# Patient Record
Sex: MALE | Race: WHITE | NOT HISPANIC OR LATINO | Employment: OTHER | URBAN - NONMETROPOLITAN AREA
[De-identification: names, ages, dates, MRNs, and addresses within clinical notes are randomized per-mention and may not be internally consistent; named-entity substitution may affect disease eponyms.]

---

## 2017-02-03 DIAGNOSIS — K21.9 GASTROESOPHAGEAL REFLUX DISEASE, ESOPHAGITIS PRESENCE NOT SPECIFIED: Primary | ICD-10-CM

## 2017-02-06 RX ORDER — ESOMEPRAZOLE MAGNESIUM 40 MG/1
40 CAPSULE, DELAYED RELEASE ORAL DAILY
Qty: 30 CAPSULE | Refills: 5 | Status: SHIPPED | OUTPATIENT
Start: 2017-02-06 | End: 2017-08-11 | Stop reason: SDUPTHER

## 2017-03-29 ENCOUNTER — OFFICE VISIT (OUTPATIENT)
Dept: GASTROENTEROLOGY | Facility: CLINIC | Age: 77
End: 2017-03-29

## 2017-03-29 VITALS
HEART RATE: 68 BPM | WEIGHT: 204 LBS | HEIGHT: 76 IN | DIASTOLIC BLOOD PRESSURE: 80 MMHG | OXYGEN SATURATION: 98 % | BODY MASS INDEX: 24.84 KG/M2 | SYSTOLIC BLOOD PRESSURE: 138 MMHG

## 2017-03-29 DIAGNOSIS — E11.9 CONTROLLED TYPE 2 DIABETES MELLITUS WITHOUT COMPLICATION, WITHOUT LONG-TERM CURRENT USE OF INSULIN (HCC): ICD-10-CM

## 2017-03-29 DIAGNOSIS — Z86.010 HX OF COLONIC POLYPS: Primary | ICD-10-CM

## 2017-03-29 PROBLEM — Z86.0100 HX OF COLONIC POLYPS: Status: ACTIVE | Noted: 2017-03-29

## 2017-03-29 PROCEDURE — S0260 H&P FOR SURGERY: HCPCS | Performed by: CLINICAL NURSE SPECIALIST

## 2017-03-29 RX ORDER — TAMSULOSIN HYDROCHLORIDE 0.4 MG/1
1 CAPSULE ORAL NIGHTLY
COMMUNITY
End: 2020-04-15

## 2017-03-29 RX ORDER — TRAMADOL HYDROCHLORIDE 50 MG/1
50 TABLET ORAL AS NEEDED
COMMUNITY
End: 2020-04-22 | Stop reason: SDUPTHER

## 2017-03-29 RX ORDER — ASPIRIN 81 MG/1
81 TABLET ORAL DAILY
COMMUNITY
End: 2022-08-03 | Stop reason: HOSPADM

## 2017-03-29 RX ORDER — ROSUVASTATIN CALCIUM 5 MG/1
5 TABLET, COATED ORAL EVERY OTHER DAY
COMMUNITY
End: 2020-11-24

## 2017-03-29 RX ORDER — ALPRAZOLAM 0.5 MG/1
0.5 TABLET ORAL NIGHTLY PRN
COMMUNITY
End: 2020-04-22 | Stop reason: SDUPTHER

## 2017-03-29 RX ORDER — METFORMIN HYDROCHLORIDE 750 MG/1
750 TABLET, EXTENDED RELEASE ORAL 2 TIMES DAILY
COMMUNITY
End: 2020-07-06

## 2017-03-29 NOTE — PROGRESS NOTES
Caleb Wood  1940      3/29/2017  Chief Complaint   Patient presents with   • Colonoscopy     Subjective   HPI  Caleb Wood is a 76 y.o. male who presents as a referral for preventative maintenance. He has no complaints of nausea or vomiting. No change in bowels. No wt loss. No BRBPR. No melena. There is a negative family hx for colon cancer. No abdominal pain.  Past Medical History:   Diagnosis Date   • Diabetes mellitus     Type 2   • Disease of thyroid gland    • GERD (gastroesophageal reflux disease)    • Hemorrhoids    • Hx of colonic polyps    • Hypercholesteremia      Past Surgical History:   Procedure Laterality Date   • CHOLECYSTECTOMY     • COLONOSCOPY  01/17/2012    Colon polyp tissue not retrieved repeat exam in 5 years   • COLONOSCOPY  01/06/2009    adenomatous polyp x1   • ENDOSCOPY  06/25/2013    [facility]   • KNEE SURGERY       Outpatient Prescriptions Marked as Taking for the 3/29/17 encounter (Office Visit) with MERARI Marie   Medication Sig Dispense Refill   • ALPRAZolam (XANAX) 0.5 MG tablet Take 0.5 mg by mouth 2 (Two) Times a Day As Needed for Anxiety.     • aspirin (ECOTRIN LOW STRENGTH) 81 MG EC tablet Take 81 mg by mouth Daily.     • metFORMIN ER (GLUCOPHAGE-XR) 750 MG 24 hr tablet Take 750 mg by mouth Daily With Breakfast.     • rosuvastatin (CRESTOR) 5 MG tablet Take 5 mg by mouth Daily.     • tamsulosin (FLOMAX) 0.4 MG capsule 24 hr capsule Take 1 capsule by mouth Every Night.     • traMADol (ULTRAM) 50 MG tablet Take 50 mg by mouth Every 6 (Six) Hours As Needed for Moderate Pain (4-6).       No Known Allergies  Social History     Social History   • Marital status:      Spouse name: N/A   • Number of children: N/A   • Years of education: N/A     Occupational History   • Not on file.     Social History Main Topics   • Smoking status: Never Smoker   • Smokeless tobacco: Not on file   • Alcohol use No   • Drug use: Not on file   • Sexual activity: Not on file     Other  "Topics Concern   • Not on file     Social History Narrative     Family History   Problem Relation Age of Onset   • Colon cancer Neg Hx    • Colon polyps Neg Hx      Health Maintenance   Topic Date Due   • TDAP/TD VACCINES (1 - Tdap) 08/10/1960   • PNEUMOCOCCAL VACCINES (65+ LOW/MEDIUM RISK) (1 of 2 - PCV13) 08/10/2006   • INFLUENZA VACCINE  08/01/2016   • LIPID PANEL  03/29/2017   • HEMOGLOBIN A1C  03/29/2017   • URINE MICROALBUMIN  03/29/2017   • COLONOSCOPY  03/29/2017   • ZOSTER VACCINE  03/29/2017   • DIABETIC FOOT EXAM  03/29/2017   • DIABETIC EYE EXAM  03/29/2017       REVIEW OF SYSTEMS  General: well appearing, no fever chills or sweats, no unexplained wt loss  HEENT: no acute visual or hearing disturbances  Cardiovascular: No chest pain or palpitations  Pulmonary: No shortness of breath, coughing, wheezing or hemoptysis  : No burning, urgency, hematuria, or dysuria  Musculoskeletal: No joint pain or stiffness  Peripheral: no edema  Skin: No lesions or rashes  Neuro: No dizziness, headaches, stroke, syncope  Endocrine: No hot or cold intolerances  Hematological: No blood dyscrasias    Objective   Vitals:    03/29/17 1308   BP: 138/80   Pulse: 68   SpO2: 98%   Weight: 204 lb (92.5 kg)   Height: 76\" (193 cm)     Body mass index is 24.83 kg/(m^2).    PHYSICAL EXAM  General: age appropriate well nourished well appearing, no acute distress  Head: normocephalic and atraumatic  Global assessment-supple  Neck-No JVD noted, no lymphadenopathy  Pulmonary-clear to auscultation bilaterally, normal respiratory effort  Cardiovascular-normal rate and rhythm, normal heart sounds, S1 and S2 noted  Abdomen-soft, non tender, non distended, normal bowel sounds all 4 quadrants, no hepatosplenomegaly noted  Extremities-No clubbing cyanosis or edema  Neuro-Non focal, converses appropriately, awake, alert, oriented    Assessment/Plan     Caleb was seen today for colonoscopy.    Diagnoses and all orders for this visit:    Hx of " colonic polyps  -     polyethylene glycol (GoLYTELY) 236 G solution; Starting at noon on day prior to procedure, drink 8 ounces every 30 minutes until all gone or stools are clear. May add flavor packet.  -     Case Request; Standing  -     Implement Anesthesia Orders Day of Procedure; Standing  -     Obtain Informed Consent; Standing  -     Verify Informed Consent; Standing  -     Verify bowel prep was successful; Standing  -     Case Request    Controlled type 2 diabetes mellitus without complication, without long-term current use of insulin  Comments:  Hold DM pill the am of procedure        COLONOSCOPY WITH ANESTHESIA (N/A)  Body mass index is 24.83 kg/(m^2).  There are no Patient Instructions on file for this visit.  Patient instructions on prep prior to procedure provided to the patient.    All risks, benefits, alternatives, and indications of colonoscopy procedure have been discussed with the patient. Risks to include perforation of the colon requiring possible surgery or colostomy, risk of bleeding from biopsies or removal of colon tissue, possibility of missing a colon polyp or cancer, or adverse drug reaction.  Benefits to include the diagnosis and management of disease of the colon and rectum. Alternatives to include barium enema, radiographic evaluation, lab testing or no intervention. Pt verbalizes understanding and agrees.

## 2017-06-01 ENCOUNTER — ANESTHESIA EVENT (OUTPATIENT)
Dept: GASTROENTEROLOGY | Facility: HOSPITAL | Age: 77
End: 2017-06-01

## 2017-06-01 ENCOUNTER — HOSPITAL ENCOUNTER (OUTPATIENT)
Facility: HOSPITAL | Age: 77
Setting detail: HOSPITAL OUTPATIENT SURGERY
Discharge: HOME | End: 2017-06-01
Attending: INTERNAL MEDICINE | Admitting: INTERNAL MEDICINE

## 2017-06-01 ENCOUNTER — ANESTHESIA (OUTPATIENT)
Dept: GASTROENTEROLOGY | Facility: HOSPITAL | Age: 77
End: 2017-06-01

## 2017-06-01 VITALS
HEIGHT: 76 IN | OXYGEN SATURATION: 100 % | RESPIRATION RATE: 11 BRPM | WEIGHT: 197 LBS | TEMPERATURE: 98.1 F | BODY MASS INDEX: 23.99 KG/M2 | SYSTOLIC BLOOD PRESSURE: 141 MMHG | DIASTOLIC BLOOD PRESSURE: 74 MMHG | HEART RATE: 53 BPM

## 2017-06-01 DIAGNOSIS — Z86.010 HX OF COLONIC POLYPS: ICD-10-CM

## 2017-06-01 LAB — GLUCOSE BLDC GLUCOMTR-MCNC: 108 MG/DL (ref 70–130)

## 2017-06-01 PROCEDURE — 25010000002 PROPOFOL 10 MG/ML EMULSION: Performed by: NURSE ANESTHETIST, CERTIFIED REGISTERED

## 2017-06-01 PROCEDURE — 88305 TISSUE EXAM BY PATHOLOGIST: CPT | Performed by: INTERNAL MEDICINE

## 2017-06-01 PROCEDURE — 82962 GLUCOSE BLOOD TEST: CPT

## 2017-06-01 PROCEDURE — 45385 COLONOSCOPY W/LESION REMOVAL: CPT | Performed by: INTERNAL MEDICINE

## 2017-06-01 RX ORDER — PROPOFOL 10 MG/ML
VIAL (ML) INTRAVENOUS AS NEEDED
Status: DISCONTINUED | OUTPATIENT
Start: 2017-06-01 | End: 2017-06-01 | Stop reason: SURG

## 2017-06-01 RX ORDER — LIDOCAINE HYDROCHLORIDE 10 MG/ML
0.5 INJECTION, SOLUTION INFILTRATION; PERINEURAL ONCE AS NEEDED
Status: DISCONTINUED | OUTPATIENT
Start: 2017-06-01 | End: 2017-06-01 | Stop reason: HOSPADM

## 2017-06-01 RX ORDER — LIDOCAINE HYDROCHLORIDE 20 MG/ML
INJECTION, SOLUTION INFILTRATION; PERINEURAL AS NEEDED
Status: DISCONTINUED | OUTPATIENT
Start: 2017-06-01 | End: 2017-06-01 | Stop reason: SURG

## 2017-06-01 RX ORDER — SODIUM CHLORIDE 0.9 % (FLUSH) 0.9 %
3 SYRINGE (ML) INJECTION AS NEEDED
Status: DISCONTINUED | OUTPATIENT
Start: 2017-06-01 | End: 2017-06-01 | Stop reason: HOSPADM

## 2017-06-01 RX ORDER — SODIUM CHLORIDE 9 MG/ML
500 INJECTION, SOLUTION INTRAVENOUS CONTINUOUS PRN
Status: DISCONTINUED | OUTPATIENT
Start: 2017-06-01 | End: 2017-06-01 | Stop reason: HOSPADM

## 2017-06-01 RX ADMIN — SODIUM CHLORIDE 500 ML: 9 INJECTION, SOLUTION INTRAVENOUS at 07:14

## 2017-06-01 RX ADMIN — LIDOCAINE HYDROCHLORIDE 50 MG: 20 INJECTION, SOLUTION INFILTRATION; PERINEURAL at 08:17

## 2017-06-01 RX ADMIN — PROPOFOL 200 MG: 10 INJECTION, EMULSION INTRAVENOUS at 08:17

## 2017-06-01 NOTE — PLAN OF CARE
Problem: Patient Care Overview (Adult)  Goal: Adult Individualization and Mutuality    06/01/17 0651   Individualization   Patient Specific Preferences none   Patient Specific Goals none   Patient Specific Interventions none   Mutuality/Individual Preferences   What Anxieties, Fears or Concerns Do You Have About Your Health or Care? none   What Questions Do You Have About Your Health or Care? none   What Information Would Help Us Give You More Personalized Care? none

## 2017-06-01 NOTE — PLAN OF CARE
Problem: GI Endoscopy (Adult)  Goal: Signs and Symptoms of Listed Potential Problems Will be Absent or Manageable (GI Endoscopy)  Outcome: Outcome(s) achieved Date Met:  06/01/17

## 2017-06-01 NOTE — H&P
[facility]  Pre Procedure History & Physical    Chief Complaint:   History of polyps    Subjective     HPI:   Here today for colonoscopy.  History of polyps.  Last exam in 2012.    Past Medical History:   Past Medical History:   Diagnosis Date   • Diabetes mellitus     Type 2   • Disease of thyroid gland    • GERD (gastroesophageal reflux disease)    • Hemorrhoids    • Hx of colonic polyps    • Hypercholesteremia        Past Surgical History:  [unfilled]    Family History:  Family History   Problem Relation Age of Onset   • Colon cancer Neg Hx    • Colon polyps Neg Hx        Social History:   reports that he has never smoked. He does not have any smokeless tobacco history on file. He reports that he does not drink alcohol or use illicit drugs.    Medications:   Prior to Admission medications    Medication Sig Start Date End Date Taking? Authorizing Provider   ALPRAZolam (XANAX) 0.5 MG tablet Take 0.5 mg by mouth 2 (Two) Times a Day As Needed for Anxiety.   Yes Historical Provider, MD   aspirin (ECOTRIN LOW STRENGTH) 81 MG EC tablet Take 81 mg by mouth Daily.   Yes Historical Provider, MD   esomeprazole (nexIUM) 40 MG capsule Take 1 capsule by mouth Daily. 2/6/17  Yes MERARI Marie   metFORMIN ER (GLUCOPHAGE-XR) 750 MG 24 hr tablet Take 750 mg by mouth Daily With Breakfast.   Yes Historical Provider, MD   polyethylene glycol (GoLYTELY) 236 G solution Starting at noon on day prior to procedure, drink 8 ounces every 30 minutes until all gone or stools are clear. May add flavor packet. 3/29/17  Yes MERARI Marie   rosuvastatin (CRESTOR) 5 MG tablet Take 5 mg by mouth Daily.    Historical Provider, MD   tamsulosin (FLOMAX) 0.4 MG capsule 24 hr capsule Take 1 capsule by mouth Every Night.    Historical Provider, MD   traMADol (ULTRAM) 50 MG tablet Take 50 mg by mouth Every 6 (Six) Hours As Needed for Moderate Pain (4-6).    Historical Provider, MD       Allergies:  Review of  "patient's allergies indicates no known allergies.    Objective     Blood pressure 140/73, pulse 66, temperature 98.1 °F (36.7 °C), temperature source Temporal Artery , resp. rate 20, height 76\" (193 cm), weight 197 lb (89.4 kg), SpO2 99 %.    Physical Exam   Constitutional: Pt is oriented to person, place, and in no distress.   HENT: Mouth/Throat: Oropharynx is clear.   Cardiovascular: Normal rate, regular rhythm.    Pulmonary/Chest: Effort normal. No respiratory distress. No  wheezes.   Abdominal: Soft. Non-distended.  Skin: Skin is warm and dry.   Psychiatric: Mood, memory, affect and judgment appear normal.     Assessment/Plan     Diagnosis:  History of polyps    Anticipated Surgical Procedure:    Proceed with colonoscopy as scheduled    The risks, benefits, and alternatives of this procedure have been discussed with the patient or the responsible party- the patient understands and agrees to proceed.    EMR Dragon/transcription disclaimer: Much of this encounter note is an electronic transcription/translation of spoken language to printed text.  The electronic translation of spoken language may permit erroneous, or at times, nonsensical words or phrases to be inadvertently transcribed.  Although I have reviewed the note for such errors, some may still exist.    Damien Odonnell MD  8:16 AM  6/1/2017    "

## 2017-06-01 NOTE — ANESTHESIA PREPROCEDURE EVALUATION
Anesthesia Evaluation     NPO Solid Status: > 8 hours       Airway   Mallampati: II  TM distance: >3 FB  Neck ROM: full  no difficulty expected  Dental    (+) upper dentures and lower dentures    Pulmonary    Cardiovascular     (+) hyperlipidemia      Neuro/Psych  GI/Hepatic/Renal/Endo    (+)  GERD, diabetes mellitus type 2,     Musculoskeletal     Abdominal    Substance History      OB/GYN          Other                                      Anesthesia Plan    ASA 2     general   total IV anesthesia  intravenous induction   Anesthetic plan and risks discussed with patient.

## 2017-06-01 NOTE — ANESTHESIA POSTPROCEDURE EVALUATION
Patient: Caleb Wood    Procedure Summary     Date Anesthesia Start Anesthesia Stop Room / Location    06/01/17 0815 0834  [facility] / [facility]       Procedure Diagnosis Surgeon Provider    COLONOSCOPY WITH ANESTHESIA (N/A ) Hx of colonic polyps  (Hx of colonic polyps [Z86.010]) MD Sonido Yepez CRNA          Anesthesia Type: No value filed.  Last vitals  BP      Temp      Pulse     Resp      SpO2        Post Anesthesia Care and Evaluation    Patient location during evaluation: PHASE II  Patient participation: complete - patient participated  Level of consciousness: awake and alert  Pain score: 0  Pain management: adequate  Airway patency: patent  Anesthetic complications: No anesthetic complications    Cardiovascular status: acceptable, hemodynamically stable and stable  Respiratory status: acceptable  Hydration status: acceptable

## 2017-06-01 NOTE — PLAN OF CARE
Problem: Patient Care Overview (Adult)  Goal: Plan of Care Review  Outcome: Outcome(s) achieved Date Met:  06/01/17 06/01/17 0839   Coping/Psychosocial Response Interventions   Plan Of Care Reviewed With patient   Patient Care Overview   Progress improving   Outcome Evaluation   Outcome Summary/Follow up Plan D/C CRITERIA MET

## 2017-06-01 NOTE — PLAN OF CARE
Problem: Patient Care Overview (Adult)  Goal: Plan of Care Review  Outcome: Ongoing (interventions implemented as appropriate)    06/01/17 0834   Coping/Psychosocial Response Interventions   Plan Of Care Reviewed With patient   Patient Care Overview   Progress no change   Outcome Evaluation   Outcome Summary/Follow up Plan pt tolerated procedure well.

## 2017-06-02 LAB
CYTO UR: NORMAL
LAB AP CASE REPORT: NORMAL
LAB AP CLINICAL INFORMATION: NORMAL
Lab: NORMAL
PATH REPORT.FINAL DX SPEC: NORMAL
PATH REPORT.GROSS SPEC: NORMAL

## 2017-06-04 ENCOUNTER — TELEPHONE (OUTPATIENT)
Dept: GASTROENTEROLOGY | Facility: CLINIC | Age: 77
End: 2017-06-04

## 2017-06-05 NOTE — TELEPHONE ENCOUNTER
Pt called answering service Saturday 6/3/17 @ 2005.  Pt c/o sudden onset of intermittent lower abdominal pain that developed earlier in the afternoon.  No aggravating or alleviating factors.  No rectal bleeding.  Colonoscopy Thurs.  No BM since cscope.  Advised pt to seek treatment at [facility] ER if he was experiencing abdominal pain.

## 2017-06-05 NOTE — TELEPHONE ENCOUNTER
Called patient to check on him states the pain is gone had a normal bowel movement yesterday no blood doing fine he states told him to call with any further problems.

## 2017-08-11 DIAGNOSIS — K21.9 GASTROESOPHAGEAL REFLUX DISEASE, ESOPHAGITIS PRESENCE NOT SPECIFIED: ICD-10-CM

## 2017-08-11 RX ORDER — ESOMEPRAZOLE MAGNESIUM 40 MG/1
40 CAPSULE, DELAYED RELEASE ORAL DAILY
Qty: 30 CAPSULE | Refills: 5 | Status: SHIPPED | OUTPATIENT
Start: 2017-08-11 | End: 2018-02-09 | Stop reason: SDUPTHER

## 2017-08-11 NOTE — TELEPHONE ENCOUNTER
Pt states that he ran out of his generic Nexium and would like a refill.  He received his last fill on 7/11/2017.

## 2018-02-09 DIAGNOSIS — K21.9 GASTROESOPHAGEAL REFLUX DISEASE, ESOPHAGITIS PRESENCE NOT SPECIFIED: ICD-10-CM

## 2018-02-09 RX ORDER — ESOMEPRAZOLE MAGNESIUM 40 MG/1
40 CAPSULE, DELAYED RELEASE ORAL DAILY
Qty: 30 CAPSULE | Refills: 11 | Status: SHIPPED | OUTPATIENT
Start: 2018-02-09 | End: 2018-05-11 | Stop reason: SDUPTHER

## 2018-05-11 DIAGNOSIS — K21.9 GASTROESOPHAGEAL REFLUX DISEASE, ESOPHAGITIS PRESENCE NOT SPECIFIED: ICD-10-CM

## 2018-05-14 RX ORDER — ESOMEPRAZOLE MAGNESIUM 40 MG/1
CAPSULE, DELAYED RELEASE ORAL
Qty: 30 CAPSULE | Refills: 11 | Status: SHIPPED | OUTPATIENT
Start: 2018-05-14 | End: 2019-03-11 | Stop reason: SDUPTHER

## 2019-03-11 DIAGNOSIS — K21.9 GASTROESOPHAGEAL REFLUX DISEASE, ESOPHAGITIS PRESENCE NOT SPECIFIED: ICD-10-CM

## 2019-03-11 RX ORDER — ESOMEPRAZOLE MAGNESIUM 40 MG/1
40 CAPSULE, DELAYED RELEASE ORAL DAILY
Qty: 30 CAPSULE | Refills: 11 | Status: SHIPPED | OUTPATIENT
Start: 2019-03-11 | End: 2020-03-16 | Stop reason: SDUPTHER

## 2020-03-09 ENCOUNTER — TELEPHONE (OUTPATIENT)
Dept: INTERNAL MEDICINE | Facility: CLINIC | Age: 80
End: 2020-03-09

## 2020-03-09 RX ORDER — LEVOTHYROXINE SODIUM 0.05 MG/1
50 TABLET ORAL DAILY
Qty: 90 TABLET | Refills: 3 | Status: SHIPPED | OUTPATIENT
Start: 2020-03-09 | End: 2021-03-08

## 2020-03-09 RX ORDER — LEVOTHYROXINE SODIUM 0.05 MG/1
50 TABLET ORAL DAILY
Qty: 90 TABLET | Refills: 3 | Status: SHIPPED | OUTPATIENT
Start: 2020-03-09 | End: 2020-03-09 | Stop reason: SDUPTHER

## 2020-03-09 NOTE — TELEPHONE ENCOUNTER
Pt requested a refill on:  Levothyroxine 50MCG    Pharmacy Preferred:  [address] - [phone]  - [phone]   [phone]    Pt can be reached at :[phone]    Pt is completely out.   done

## 2020-03-09 NOTE — TELEPHONE ENCOUNTER
Pt requested a refill on:  Levothyroxine 50MCG     Pharmacy Preferred:  [facility] [address] [place] [address] - [phone]  - [phone]   [phone]      Pt said he is completely out of medicine.     Please call pt once Rx called in

## 2020-03-16 DIAGNOSIS — K21.9 GASTROESOPHAGEAL REFLUX DISEASE, ESOPHAGITIS PRESENCE NOT SPECIFIED: ICD-10-CM

## 2020-03-16 RX ORDER — ESOMEPRAZOLE MAGNESIUM 40 MG/1
40 CAPSULE, DELAYED RELEASE ORAL DAILY
Qty: 30 CAPSULE | Refills: 11 | Status: SHIPPED | OUTPATIENT
Start: 2020-03-16 | End: 2021-03-22

## 2020-03-19 ENCOUNTER — OFFICE VISIT (OUTPATIENT)
Dept: INTERNAL MEDICINE | Facility: CLINIC | Age: 80
End: 2020-03-19

## 2020-03-19 VITALS
OXYGEN SATURATION: 99 % | BODY MASS INDEX: 24.72 KG/M2 | SYSTOLIC BLOOD PRESSURE: 132 MMHG | DIASTOLIC BLOOD PRESSURE: 80 MMHG | HEIGHT: 76 IN | HEART RATE: 76 BPM | WEIGHT: 203 LBS

## 2020-03-19 DIAGNOSIS — I10 BENIGN ESSENTIAL HTN: ICD-10-CM

## 2020-03-19 DIAGNOSIS — E03.9 ACQUIRED HYPOTHYROIDISM: ICD-10-CM

## 2020-03-19 DIAGNOSIS — F32.4 MAJOR DEPRESSIVE DISORDER WITH SINGLE EPISODE, IN PARTIAL REMISSION (HCC): ICD-10-CM

## 2020-03-19 DIAGNOSIS — E11.9 CONTROLLED TYPE 2 DIABETES MELLITUS WITHOUT COMPLICATION, WITHOUT LONG-TERM CURRENT USE OF INSULIN (HCC): Primary | ICD-10-CM

## 2020-03-19 PROBLEM — R31.9 HEMATURIA SYNDROME: Status: ACTIVE | Noted: 2020-03-19

## 2020-03-19 PROBLEM — Z86.010 HISTORY OF COLONIC POLYPS: Status: ACTIVE | Noted: 2020-03-19

## 2020-03-19 PROBLEM — Z86.0100 HISTORY OF COLONIC POLYPS: Status: ACTIVE | Noted: 2020-03-19

## 2020-03-19 PROBLEM — M54.2 NECK PAIN: Status: ACTIVE | Noted: 2020-03-19

## 2020-03-19 PROBLEM — N20.0 CALCULUS OF KIDNEY: Status: ACTIVE | Noted: 2020-03-19

## 2020-03-19 PROBLEM — E78.5 HYPERLIPIDEMIA: Status: ACTIVE | Noted: 2020-03-19

## 2020-03-19 PROBLEM — F43.23 SITUATIONAL MIXED ANXIETY AND DEPRESSIVE DISORDER: Status: ACTIVE | Noted: 2020-03-19

## 2020-03-19 PROBLEM — D70.9 NEUTROPENIA (HCC): Status: ACTIVE | Noted: 2020-03-19

## 2020-03-19 LAB — HBA1C MFR BLD: 6.7 %

## 2020-03-19 PROCEDURE — 99214 OFFICE O/P EST MOD 30 MIN: CPT | Performed by: INTERNAL MEDICINE

## 2020-03-19 PROCEDURE — 83036 HEMOGLOBIN GLYCOSYLATED A1C: CPT | Performed by: INTERNAL MEDICINE

## 2020-03-19 NOTE — PROGRESS NOTES
Dr. Valencia addendum to glasses Rx completed yesterday  Reggie Linares RN 6:35 AM 05/09/19         Note to Dr. Valencia to assist if able to print or update glasses Rx with diagnosis code for transitional lenses for medical necessity per request  Reggie Linares RN 2:14 PM 05/08/19           [facility]    Phone Message    May a detailed message be left on voicemail: yes    Reason for Call: Other: Pt requests a call back from staff to discuss her need of a letter of medical necessity for her to get the glasses she wants made with a transiitions lense.      Action Taken: Message routed to:  [facility] eye clinic     Subjective   Caleb Wood is a 79 y.o. male.   Chief Complaint   Patient presents with   • Diabetes     A1C 6.7   • Hypertension       Patient is here for diabetes follow-up as well as his depression.  He states that he has not been eating as well recently due to the depression.  He does have a  friend that he meets with on a daily basis.  He had some questions about being with him I gave him the usual six-foot distancing recommendations.  In regard to his depression he has been seen by mental health services however he says that that actually makes his condition worse.  He describes it as being like a wound that is continuing to get irritated.  He is not suicidal or having those types of issues.       The following portions of the patient's history were reviewed and updated as appropriate: allergies, current medications, past family history, past medical history, past social history, past surgical history and problem list.    Review of Systems   Constitutional: Negative for activity change, appetite change, fatigue, fever, unexpected weight gain and unexpected weight loss.   HENT: Negative for swollen glands, trouble swallowing and voice change.    Eyes: Negative for blurred vision and visual disturbance.   Respiratory: Negative for cough and shortness of breath.    Cardiovascular: Negative for chest pain, palpitations and leg swelling.   Gastrointestinal: Negative for abdominal pain, constipation, diarrhea, nausea, vomiting and indigestion.   Endocrine: Negative for cold intolerance, heat intolerance, polydipsia and polyphagia.   Genitourinary: Negative for dysuria and frequency.   Musculoskeletal: Negative for arthralgias, back pain, joint swelling and neck pain.   Skin: Negative for color change, rash and skin lesions.   Neurological: Negative for dizziness, weakness, headache, memory problem and confusion.   Hematological: Does not bruise/bleed easily.   Psychiatric/Behavioral: Negative for agitation,  hallucinations and suicidal ideas. The patient is not nervous/anxious.        Objective   Past Medical History:   Diagnosis Date   • Diabetes mellitus (CMS/HCC)     Type 2   • Disease of thyroid gland    • GERD (gastroesophageal reflux disease)    • Hemorrhoids    • Hx of colonic polyps    • Hypercholesteremia       Past Surgical History:   Procedure Laterality Date   • CHOLECYSTECTOMY     • COLONOSCOPY  01/17/2012    Colon polyp tissue not retrieved repeat exam in 5 years   • COLONOSCOPY  01/06/2009    adenomatous polyp x1   • COLONOSCOPY N/A 6/1/2017    Procedure: COLONOSCOPY WITH ANESTHESIA;  Surgeon: Damien Odonnell MD;  Location: [facility] ENDOSCOPY;  Service:    • ENDOSCOPY  06/25/2013    [facility]   • KNEE SURGERY          Current Outpatient Medications:   •  ALPRAZolam (XANAX) 0.5 MG tablet, Take 0.5 mg by mouth At Night As Needed for Anxiety., Disp: , Rfl:   •  aspirin (ECOTRIN LOW STRENGTH) 81 MG EC tablet, Take 81 mg by mouth Daily., Disp: , Rfl:   •  esomeprazole (nexIUM) 40 MG capsule, Take 1 capsule by mouth Daily., Disp: 30 capsule, Rfl: 11  •  levothyroxine (SYNTHROID) 50 MCG tablet, Take 1 tablet by mouth Daily., Disp: 90 tablet, Rfl: 3  •  metFORMIN ER (GLUCOPHAGE-XR) 750 MG 24 hr tablet, Take 750 mg by mouth 2 (Two) Times a Day., Disp: , Rfl:   •  rosuvastatin (CRESTOR) 5 MG tablet, Take 5 mg by mouth Every Other Day., Disp: , Rfl:   •  tamsulosin (FLOMAX) 0.4 MG capsule 24 hr capsule, Take 1 capsule by mouth Every Night., Disp: , Rfl:   •  traMADol (ULTRAM) 50 MG tablet, Take 50 mg by mouth As Needed for Moderate Pain ., Disp: , Rfl:      Vitals:    03/19/20 0748   BP: 132/80   Pulse: 76   SpO2: 99%         03/19/20  0748   Weight: 92.1 kg (203 lb)     Patient's Body mass index is 24.71 kg/m². BMI is within normal parameters. No follow-up required..      Physical Exam   Constitutional: He is oriented to person, place, and time. He appears well-developed and well-nourished.   HENT:   Head: Normocephalic and  atraumatic.   Right Ear: External ear normal.   Left Ear: External ear normal.   Nose: Nose normal.   Mouth/Throat: Oropharynx is clear and moist.   Eyes: Pupils are equal, round, and reactive to light. Conjunctivae and EOM are normal.   Neck: Normal range of motion. Neck supple. No thyromegaly present.   Cardiovascular: Normal rate, regular rhythm, normal heart sounds and intact distal pulses.   Pulmonary/Chest: Effort normal and breath sounds normal.   Abdominal: Soft. Bowel sounds are normal.   Lymphadenopathy:     He has no cervical adenopathy.   Neurological: He is alert and oriented to person, place, and time.   Skin: Skin is warm and dry.   Psychiatric: He has a normal mood and affect. His behavior is normal. Thought content normal.   Nursing note and vitals reviewed.            Assessment/Plan   Diagnoses and all orders for this visit:    1. Controlled type 2 diabetes mellitus without complication, without long-term current use of insulin (CMS/[facility]) (Primary)  -     POC Glycosylated Hemoglobin (Hb A1C)    2. Benign essential HTN    3. Acquired hypothyroidism    4. Major depressive disorder with single episode, in partial remission (CMS/[facility])        Patient's diabetes is well controlled at this point we discussed diet exercise when the weather improves.  His blood pressure is well controlled there is no change in his medication hypothyroidism is currently treated he is taking his medication as prescribed.  We talked about his depression and ways of social distancing while he is talking with local  which seems to be helping him.  No new interventions are prescribed today

## 2020-04-15 RX ORDER — TAMSULOSIN HYDROCHLORIDE 0.4 MG/1
CAPSULE ORAL
Qty: 90 CAPSULE | Refills: 3 | Status: SHIPPED | OUTPATIENT
Start: 2020-04-15 | End: 2021-04-12

## 2020-04-22 DIAGNOSIS — G89.29 CHRONIC LOW BACK PAIN, UNSPECIFIED BACK PAIN LATERALITY, UNSPECIFIED WHETHER SCIATICA PRESENT: Primary | ICD-10-CM

## 2020-04-22 DIAGNOSIS — M54.50 CHRONIC LOW BACK PAIN, UNSPECIFIED BACK PAIN LATERALITY, UNSPECIFIED WHETHER SCIATICA PRESENT: Primary | ICD-10-CM

## 2020-04-22 DIAGNOSIS — F43.23 SITUATIONAL MIXED ANXIETY AND DEPRESSIVE DISORDER: ICD-10-CM

## 2020-04-22 NOTE — TELEPHONE ENCOUNTER
Looks like the last time we filled the Ultram was in 2016. Will patient need telephone visit with Dr Garcias?

## 2020-04-22 NOTE — TELEPHONE ENCOUNTER
PT called to request refills on the following medications:   · ALPRAZolam (XANAX) 0.5 MG tablet  · traMADol (ULTRAM) 50 MG tablet     Confirmed Pharmacy:   [address] [phone] [facility] [phone] [facility]     LOV: 3/19/20  NOV: 10/5/20  Last Refill: Unknown, PT states he hasn't filled the Tramdol since 2016 because he takes it PRN, but now he's out.

## 2020-04-23 RX ORDER — ALPRAZOLAM 0.5 MG/1
0.5 TABLET ORAL NIGHTLY PRN
Qty: 30 TABLET | Refills: 5 | Status: SHIPPED | OUTPATIENT
Start: 2020-04-23 | End: 2020-10-26 | Stop reason: SDUPTHER

## 2020-04-23 RX ORDER — TRAMADOL HYDROCHLORIDE 50 MG/1
50 TABLET ORAL DAILY PRN
Qty: 30 TABLET | Refills: 0 | Status: SHIPPED | OUTPATIENT
Start: 2020-04-23 | End: 2021-11-04 | Stop reason: SDUPTHER

## 2020-04-23 NOTE — TELEPHONE ENCOUNTER
Patient has enough xanax for Saturday, but due to the doctor not being in the office on Friday was trying to have refills done. Also he states that tramadol is used for back pain. Had a prescription but went out in 2016, does not care if it is for  30 would just like to have when he is out and hurts his back.

## 2020-04-23 NOTE — TELEPHONE ENCOUNTER
Looks like he has chronic back pain, but we need to call him and ask what he needs pain medication for, then forward to Dr. Garcias for final approval.  You can go ahead and pend the Rx, after you talk with him.

## 2020-07-06 RX ORDER — METFORMIN HYDROCHLORIDE 750 MG/1
TABLET, EXTENDED RELEASE ORAL
Qty: 180 TABLET | Refills: 3 | Status: SHIPPED | OUTPATIENT
Start: 2020-07-06 | End: 2021-07-21

## 2020-07-22 ENCOUNTER — TELEPHONE (OUTPATIENT)
Dept: INTERNAL MEDICINE | Facility: CLINIC | Age: 80
End: 2020-07-22

## 2020-07-27 ENCOUNTER — OFFICE VISIT (OUTPATIENT)
Dept: INTERNAL MEDICINE | Facility: CLINIC | Age: 80
End: 2020-07-27

## 2020-07-27 VITALS
BODY MASS INDEX: 24.72 KG/M2 | WEIGHT: 203 LBS | HEART RATE: 61 BPM | TEMPERATURE: 98.9 F | OXYGEN SATURATION: 99 % | SYSTOLIC BLOOD PRESSURE: 132 MMHG | DIASTOLIC BLOOD PRESSURE: 78 MMHG | HEIGHT: 76 IN

## 2020-07-27 DIAGNOSIS — E78.00 PURE HYPERCHOLESTEROLEMIA: ICD-10-CM

## 2020-07-27 DIAGNOSIS — I10 BENIGN ESSENTIAL HTN: Primary | ICD-10-CM

## 2020-07-27 DIAGNOSIS — G56.91 NEUROPATHY, ARM, RIGHT: ICD-10-CM

## 2020-07-27 PROBLEM — G89.29 CHRONIC BILATERAL LOW BACK PAIN WITHOUT SCIATICA: Status: ACTIVE | Noted: 2020-07-27

## 2020-07-27 PROBLEM — M54.50 CHRONIC BILATERAL LOW BACK PAIN WITHOUT SCIATICA: Status: ACTIVE | Noted: 2020-07-27

## 2020-07-27 PROCEDURE — 99214 OFFICE O/P EST MOD 30 MIN: CPT | Performed by: INTERNAL MEDICINE

## 2020-07-27 RX ORDER — UBIDECARENONE 100 MG
200 CAPSULE ORAL EVERY EVENING
COMMUNITY

## 2020-07-27 RX ORDER — IBUPROFEN 200 MG
200 TABLET ORAL EVERY 8 HOURS PRN
COMMUNITY

## 2020-07-27 NOTE — PROGRESS NOTES
Subjective   Caleb Wood is a 79 y.o. male.   Chief Complaint   Patient presents with   • Numbness     TINGLING & NUMBNESS OF R ARM; ONGOING 5 DAYS; PREV. HAD SHOULDER SURGERY FOR PINCHED NERVE, COULD NOT GET INTO [facility], GOT A MASSAGE AND FELT BETTER AT TIME       Patient has the above-mentioned tingling in his arm however he has no loss of strength he has good range of motion etc.  He is not complaining of neck pain.       The following portions of the patient's history were reviewed and updated as appropriate: allergies, current medications, past family history, past medical history, past social history, past surgical history and problem list.    Review of Systems   Constitutional: Negative for activity change, appetite change, fatigue, fever, unexpected weight gain and unexpected weight loss.   HENT: Negative for swollen glands, trouble swallowing and voice change.    Eyes: Negative for blurred vision and visual disturbance.   Respiratory: Negative for cough and shortness of breath.    Cardiovascular: Negative for chest pain, palpitations and leg swelling.   Gastrointestinal: Negative for abdominal pain, constipation, diarrhea, nausea, vomiting and indigestion.   Endocrine: Negative for cold intolerance, heat intolerance, polydipsia and polyphagia.   Genitourinary: Negative for dysuria and frequency.   Musculoskeletal: Negative for arthralgias, back pain, joint swelling and neck pain.   Skin: Negative for color change, rash and skin lesions.   Neurological: Negative for dizziness, weakness, headache, memory problem and confusion.   Hematological: Does not bruise/bleed easily.   Psychiatric/Behavioral: Negative for agitation, hallucinations and suicidal ideas. The patient is not nervous/anxious.        Objective   Past Medical History:   Diagnosis Date   • Diabetes mellitus (CMS/HCC)     Type 2   • Disease of thyroid gland    • GERD (gastroesophageal reflux disease)    • Hemorrhoids    • Hx of colonic polyps    •  Hypercholesteremia       Past Surgical History:   Procedure Laterality Date   • CHOLECYSTECTOMY     • COLONOSCOPY  01/17/2012    Colon polyp tissue not retrieved repeat exam in 5 years   • COLONOSCOPY  01/06/2009    adenomatous polyp x1   • COLONOSCOPY N/A 6/1/2017    Procedure: COLONOSCOPY WITH ANESTHESIA;  Surgeon: Damien Odonnell MD;  Location: [facility] ENDOSCOPY;  Service:    • ENDOSCOPY  06/25/2013    HH   • KNEE SURGERY     • SHOULDER SURGERY Right         Current Outpatient Medications:   •  ALPRAZolam (XANAX) 0.5 MG tablet, Take 1 tablet by mouth At Night As Needed for Anxiety., Disp: 30 tablet, Rfl: 5  •  aspirin (ECOTRIN LOW STRENGTH) 81 MG EC tablet, Take 81 mg by mouth Daily., Disp: , Rfl:   •  coenzyme Q10 100 MG capsule, Take 200 mg by mouth Every Evening., Disp: , Rfl:   •  esomeprazole (nexIUM) 40 MG capsule, Take 1 capsule by mouth Daily., Disp: 30 capsule, Rfl: 11  •  ibuprofen (ADVIL,MOTRIN) 200 MG tablet, Take 200 mg by mouth Every 8 (Eight) Hours As Needed for Mild Pain ., Disp: , Rfl:   •  levothyroxine (SYNTHROID) 50 MCG tablet, Take 1 tablet by mouth Daily., Disp: 90 tablet, Rfl: 3  •  metFORMIN ER (GLUCOPHAGE-XR) 750 MG 24 hr tablet, Take 1 tablet by mouth twice daily, Disp: 180 tablet, Rfl: 3  •  rosuvastatin (CRESTOR) 5 MG tablet, Take 5 mg by mouth Every Other Day., Disp: , Rfl:   •  tamsulosin (FLOMAX) 0.4 MG capsule 24 hr capsule, TAKE 1 CAPSULE BY MOUTH ONCE A DAY AT BEDTIME, Disp: 90 capsule, Rfl: 3  •  traMADol (ULTRAM) 50 MG tablet, Take 1 tablet by mouth Daily As Needed for Moderate Pain ., Disp: 30 tablet, Rfl: 0     Vitals:    07/27/20 1432   BP: 132/78   Pulse: 61   Temp: 98.9 °F (37.2 °C)   SpO2: 99%         07/27/20  1432   Weight: 92.1 kg (203 lb)     Patient's Body mass index is 24.71 kg/m². BMI is within normal parameters. No follow-up required..      Physical Exam   Constitutional: He is oriented to person, place, and time. He appears well-developed and well-nourished.    HENT:   Head: Normocephalic and atraumatic.   Right Ear: External ear normal.   Left Ear: External ear normal.   Nose: Nose normal.   Mouth/Throat: Oropharynx is clear and moist.   Eyes: Pupils are equal, round, and reactive to light. Conjunctivae and EOM are normal.   Neck: Normal range of motion. Neck supple. No thyromegaly present.   Cardiovascular: Normal rate, regular rhythm, normal heart sounds and intact distal pulses.   Pulmonary/Chest: Effort normal and breath sounds normal.   Abdominal: Soft. Bowel sounds are normal.   Lymphadenopathy:     He has no cervical adenopathy.   Neurological: He is alert and oriented to person, place, and time.   Skin: Skin is warm and dry.   Psychiatric: He has a normal mood and affect. His behavior is normal. Thought content normal.   Nursing note and vitals reviewed.            Assessment/Plan   Diagnoses and all orders for this visit:    1. Benign essential HTN (Primary)    2. Pure hypercholesterolemia    3. Neuropathy, arm, right      At this point patient does have some sensations occurring in his right arm I cannot discern whether this is from his neck or impingement of the nerve ending at the level of the shoulder or the elbow.  I have encouraged him to do some stretches he apparently is already been counseled regarding that he is also going to seek chiropractic care if that does not help we will be forced to do an MRI of his neck and right shoulder.

## 2020-10-19 ENCOUNTER — TELEPHONE (OUTPATIENT)
Dept: INTERNAL MEDICINE | Facility: CLINIC | Age: 80
End: 2020-10-19

## 2020-10-19 RX ORDER — CYCLOBENZAPRINE HCL 10 MG
10 TABLET ORAL 3 TIMES DAILY PRN
Qty: 30 TABLET | Refills: 1 | Status: SHIPPED | OUTPATIENT
Start: 2020-10-19 | End: 2021-11-04 | Stop reason: SDUPTHER

## 2020-10-19 NOTE — TELEPHONE ENCOUNTER
Patient called and wanted to have Rx of Cyclobenzaprine 10 MG filled as the Rx he has is .     Please contact patient and advise. [phone].      Patient wanted to have Rx of Cyclobenzaprine to be sent along with one refill

## 2020-10-19 NOTE — TELEPHONE ENCOUNTER
Has documented chronic low back pain with prn use of Flexeril in [employer].  Rx pended for approval.

## 2020-10-26 DIAGNOSIS — F43.23 SITUATIONAL MIXED ANXIETY AND DEPRESSIVE DISORDER: ICD-10-CM

## 2020-10-26 RX ORDER — ALPRAZOLAM 0.5 MG/1
0.5 TABLET ORAL NIGHTLY PRN
Qty: 30 TABLET | Refills: 5 | Status: SHIPPED | OUTPATIENT
Start: 2020-10-26 | End: 2021-04-05 | Stop reason: SDUPTHER

## 2020-10-26 NOTE — TELEPHONE ENCOUNTER
Caller: NinaCaleb    Relationship: Self    Best call back number: [phone]    Medication needed:   Requested Prescriptions     Pending Prescriptions Disp Refills   • ALPRAZolam (XANAX) 0.5 MG tablet 30 tablet 5     Sig: Take 1 tablet by mouth At Night As Needed for Anxiety.       When do you need the refill by:   ASAP    What details did the patient provide when requesting the medication:   N/A    Does the patient have less than a 3 day supply:  [x] Yes  [] No    What is the patient's preferred pharmacy: [facility] [address] [place] [address] [phone] [facility] [phone]

## 2020-11-10 ENCOUNTER — TELEPHONE (OUTPATIENT)
Dept: INTERNAL MEDICINE | Facility: CLINIC | Age: 80
End: 2020-11-10

## 2020-11-10 NOTE — TELEPHONE ENCOUNTER
PATIENT IS REQUESTING A Z PACK HE STATED THE Z PACK IS WHAT DR NIEVES STATED HE WOULD NEED TO ASK HIS PRIMARY DR TO HELP HIM WITH HIS 4TH LUMBAR THAT IS SWOLLEN AND HAS SLIPPED     GOOD CONTACT NUMBER   [phone] (H)      VERIFIED   [address] [phone]  - [phone]   [phone]

## 2020-11-11 ENCOUNTER — OFFICE VISIT (OUTPATIENT)
Dept: INTERNAL MEDICINE | Facility: CLINIC | Age: 80
End: 2020-11-11

## 2020-11-11 VITALS
TEMPERATURE: 97.5 F | OXYGEN SATURATION: 99 % | DIASTOLIC BLOOD PRESSURE: 78 MMHG | WEIGHT: 203 LBS | HEIGHT: 76 IN | HEART RATE: 65 BPM | BODY MASS INDEX: 24.72 KG/M2 | SYSTOLIC BLOOD PRESSURE: 136 MMHG

## 2020-11-11 DIAGNOSIS — E11.9 CONTROLLED TYPE 2 DIABETES MELLITUS WITHOUT COMPLICATION, WITHOUT LONG-TERM CURRENT USE OF INSULIN (HCC): ICD-10-CM

## 2020-11-11 DIAGNOSIS — I10 BENIGN ESSENTIAL HTN: ICD-10-CM

## 2020-11-11 DIAGNOSIS — M54.16 RIGHT LUMBAR RADICULITIS: Primary | ICD-10-CM

## 2020-11-11 PROCEDURE — 99214 OFFICE O/P EST MOD 30 MIN: CPT | Performed by: INTERNAL MEDICINE

## 2020-11-11 RX ORDER — METHYLPREDNISOLONE 4 MG/1
TABLET ORAL
Qty: 1 TABLET | Refills: 1 | Status: SHIPPED | OUTPATIENT
Start: 2020-11-11 | End: 2020-12-01

## 2020-11-11 NOTE — PROGRESS NOTES
Subjective   Caleb Wood is a 80 y.o. male.   Chief Complaint   Patient presents with   • Back Pain     lower back  L4: chrio suggested a steriod; pt states flexeril is not helping much        Patient having severe lower back pain he states that he has been helped somewhat by the muscle relaxer he has been seeing a chiropractor.  He injured his back probably around October 1.  He states the pain is severe and there is not been a lot of things that have helped relieve it.  He has difficulty getting up and moving around lying down sitting down etc.       The following portions of the patient's history were reviewed and updated as appropriate: allergies, current medications, past family history, past medical history, past social history, past surgical history and problem list.    Review of Systems   Constitutional: Negative for activity change, appetite change, fatigue, fever, unexpected weight gain and unexpected weight loss.   HENT: Negative for swollen glands, trouble swallowing and voice change.    Eyes: Negative for blurred vision and visual disturbance.   Respiratory: Negative for cough and shortness of breath.    Cardiovascular: Negative for chest pain, palpitations and leg swelling.   Gastrointestinal: Negative for abdominal pain, constipation, diarrhea, nausea, vomiting and indigestion.   Endocrine: Negative for cold intolerance, heat intolerance, polydipsia and polyphagia.   Genitourinary: Negative for dysuria and frequency.   Musculoskeletal: Positive for back pain. Negative for arthralgias, joint swelling and neck pain.   Skin: Negative for color change, rash and skin lesions.   Neurological: Negative for dizziness, weakness, headache, memory problem and confusion.   Hematological: Does not bruise/bleed easily.   Psychiatric/Behavioral: Negative for agitation, hallucinations and suicidal ideas. The patient is not nervous/anxious.        Objective   Past Medical History:   Diagnosis Date   • Diabetes  mellitus (CMS/HCC)     Type 2   • Disease of thyroid gland    • GERD (gastroesophageal reflux disease)    • Hemorrhoids    • Hx of colonic polyps    • Hypercholesteremia       Past Surgical History:   Procedure Laterality Date   • CHOLECYSTECTOMY     • COLONOSCOPY  01/17/2012    Colon polyp tissue not retrieved repeat exam in 5 years   • COLONOSCOPY  01/06/2009    adenomatous polyp x1   • COLONOSCOPY N/A 6/1/2017    Procedure: COLONOSCOPY WITH ANESTHESIA;  Surgeon: Damien Odonnell MD;  Location: [facility] ENDOSCOPY;  Service:    • ENDOSCOPY  06/25/2013    HH   • KNEE SURGERY     • SHOULDER SURGERY Right         Current Outpatient Medications:   •  ALPRAZolam (XANAX) 0.5 MG tablet, Take 1 tablet by mouth At Night As Needed for Anxiety., Disp: 30 tablet, Rfl: 5  •  aspirin (ECOTRIN LOW STRENGTH) 81 MG EC tablet, Take 81 mg by mouth Daily., Disp: , Rfl:   •  coenzyme Q10 100 MG capsule, Take 200 mg by mouth Every Evening., Disp: , Rfl:   •  cyclobenzaprine (FLEXERIL) 10 MG tablet, Take 1 tablet by mouth 3 (Three) Times a Day As Needed for Muscle Spasms., Disp: 30 tablet, Rfl: 1  •  diclofenac (Voltaren) 1 % gel gel, Apply 4 g topically to the appropriate area as directed 4 (Four) Times a Day As Needed. 1-3 times daily, Disp: , Rfl:   •  esomeprazole (nexIUM) 40 MG capsule, Take 1 capsule by mouth Daily., Disp: 30 capsule, Rfl: 11  •  ibuprofen (ADVIL,MOTRIN) 200 MG tablet, Take 200 mg by mouth Every 8 (Eight) Hours As Needed for Mild Pain ., Disp: , Rfl:   •  levothyroxine (SYNTHROID) 50 MCG tablet, Take 1 tablet by mouth Daily., Disp: 90 tablet, Rfl: 3  •  metFORMIN ER (GLUCOPHAGE-XR) 750 MG 24 hr tablet, Take 1 tablet by mouth twice daily, Disp: 180 tablet, Rfl: 3  •  rosuvastatin (CRESTOR) 5 MG tablet, Take 5 mg by mouth Every Other Day., Disp: , Rfl:   •  tamsulosin (FLOMAX) 0.4 MG capsule 24 hr capsule, TAKE 1 CAPSULE BY MOUTH ONCE A DAY AT BEDTIME, Disp: 90 capsule, Rfl: 3  •  traMADol (ULTRAM) 50 MG tablet, Take 1  tablet by mouth Daily As Needed for Moderate Pain ., Disp: 30 tablet, Rfl: 0  •  methylPREDNISolone (MEDROL) 4 MG dose pack, Take as directed on package instructions., Disp: 1 tablet, Rfl: 1     Vitals:    11/11/20 1538   BP: 136/78   Pulse: 65   Temp: 97.5 °F (36.4 °C)   SpO2: 99%         11/11/20  1538   Weight: 92.1 kg (203 lb)     Patient's Body mass index is 24.71 kg/m². BMI is .      Physical Exam  Vitals signs and nursing note reviewed.   Constitutional:       General: He is not in acute distress.     Appearance: Normal appearance. He is well-developed.   HENT:      Head: Normocephalic and atraumatic.      Right Ear: External ear normal.      Left Ear: External ear normal.      Nose: Nose normal.   Eyes:      Extraocular Movements: Extraocular movements intact.      Conjunctiva/sclera: Conjunctivae normal.      Pupils: Pupils are equal, round, and reactive to light.   Neck:      Musculoskeletal: Normal range of motion and neck supple. No neck rigidity or muscular tenderness.   Cardiovascular:      Rate and Rhythm: Normal rate and regular rhythm.      Pulses: Normal pulses.      Heart sounds: Normal heart sounds.   Pulmonary:      Effort: Pulmonary effort is normal.      Breath sounds: Normal breath sounds.   Abdominal:      General: Bowel sounds are normal.      Palpations: Abdomen is soft.   Musculoskeletal:         General: Tenderness ( Very tender right lower back and right paraspinous muscles.  He also has a positive straight leg raise on the right at about 45 degrees he develops right back pain when stretching his left leg as well.) present.   Skin:     General: Skin is warm and dry.   Neurological:      General: No focal deficit present.      Mental Status: He is alert and oriented to person, place, and time.   Psychiatric:         Mood and Affect: Mood normal.         Behavior: Behavior normal.               Assessment/Plan   Diagnoses and all orders for this visit:    1. Right lumbar radiculitis  (Primary)    2. Benign essential HTN    3. Controlled type 2 diabetes mellitus without complication, without long-term current use of insulin (CMS/[facility])    Other orders  -     methylPREDNISolone (MEDROL) 4 MG dose pack; Take as directed on package instructions.  Dispense: 1 tablet; Refill: 1      Went ahead and gave patient a steroid Dosepak as I think that is the only thing that is going to help take the inflammation down quickly.  His diabetes should be okay with that he is able to monitor that at home.  His blood pressure seems to be acceptable.  I am requesting he bring in outside x-rays so that I can review them in 10 days when I follow him up.

## 2020-11-23 ENCOUNTER — OFFICE VISIT (OUTPATIENT)
Dept: INTERNAL MEDICINE | Facility: CLINIC | Age: 80
End: 2020-11-23

## 2020-11-23 VITALS
DIASTOLIC BLOOD PRESSURE: 80 MMHG | HEART RATE: 77 BPM | TEMPERATURE: 97.3 F | WEIGHT: 201.8 LBS | HEIGHT: 76 IN | BODY MASS INDEX: 24.57 KG/M2 | SYSTOLIC BLOOD PRESSURE: 142 MMHG | OXYGEN SATURATION: 98 %

## 2020-11-23 DIAGNOSIS — E11.9 CONTROLLED TYPE 2 DIABETES MELLITUS WITHOUT COMPLICATION, WITHOUT LONG-TERM CURRENT USE OF INSULIN (HCC): ICD-10-CM

## 2020-11-23 DIAGNOSIS — R11.0 NAUSEA: Primary | ICD-10-CM

## 2020-11-23 DIAGNOSIS — I10 BENIGN ESSENTIAL HTN: ICD-10-CM

## 2020-11-23 PROCEDURE — 99214 OFFICE O/P EST MOD 30 MIN: CPT | Performed by: INTERNAL MEDICINE

## 2020-11-23 NOTE — PROGRESS NOTES
Subjective   Caleb Wood is a 80 y.o. male.   Chief Complaint   Patient presents with   • Follow-up     10 day follow up, right lumbar pain, review xrays       Patient states that since he took the Medrol Dosepak he has been doing much better he did not have to take a second round he is up and moving doing things.  Did bring him with him outside films of his lumbar spine an AP and lateral       The following portions of the patient's history were reviewed and updated as appropriate: allergies, current medications, past family history, past medical history, past social history, past surgical history and problem list.    Review of Systems   Constitutional: Negative for activity change, appetite change, fatigue, fever, unexpected weight gain and unexpected weight loss.   HENT: Negative for swollen glands, trouble swallowing and voice change.    Eyes: Negative for blurred vision and visual disturbance.   Respiratory: Negative for cough and shortness of breath.    Cardiovascular: Negative for chest pain, palpitations and leg swelling.   Gastrointestinal: Negative for abdominal pain, constipation, diarrhea, nausea, vomiting and indigestion.   Endocrine: Negative for cold intolerance, heat intolerance, polydipsia and polyphagia.   Genitourinary: Negative for dysuria and frequency.   Musculoskeletal: Negative for arthralgias, back pain, joint swelling and neck pain.   Skin: Negative for color change, rash and skin lesions.   Neurological: Negative for dizziness, weakness, headache, memory problem and confusion.   Hematological: Does not bruise/bleed easily.   Psychiatric/Behavioral: Negative for agitation, hallucinations and suicidal ideas. The patient is not nervous/anxious.        Objective   Past Medical History:   Diagnosis Date   • Diabetes mellitus (CMS/HCC)     Type 2   • Disease of thyroid gland    • GERD (gastroesophageal reflux disease)    • Hemorrhoids    • Hx of colonic polyps    • Hypercholesteremia       Past  Surgical History:   Procedure Laterality Date   • CHOLECYSTECTOMY     • COLONOSCOPY  01/17/2012    Colon polyp tissue not retrieved repeat exam in 5 years   • COLONOSCOPY  01/06/2009    adenomatous polyp x1   • COLONOSCOPY N/A 6/1/2017    Procedure: COLONOSCOPY WITH ANESTHESIA;  Surgeon: Damien Odonnell MD;  Location: [facility] ENDOSCOPY;  Service:    • ENDOSCOPY  06/25/2013    HH   • KNEE SURGERY     • SHOULDER SURGERY Right         Current Outpatient Medications:   •  ALPRAZolam (XANAX) 0.5 MG tablet, Take 1 tablet by mouth At Night As Needed for Anxiety., Disp: 30 tablet, Rfl: 5  •  aspirin (ECOTRIN LOW STRENGTH) 81 MG EC tablet, Take 81 mg by mouth Daily., Disp: , Rfl:   •  coenzyme Q10 100 MG capsule, Take 200 mg by mouth Every Evening., Disp: , Rfl:   •  cyclobenzaprine (FLEXERIL) 10 MG tablet, Take 1 tablet by mouth 3 (Three) Times a Day As Needed for Muscle Spasms., Disp: 30 tablet, Rfl: 1  •  diclofenac (Voltaren) 1 % gel gel, Apply 4 g topically to the appropriate area as directed 4 (Four) Times a Day As Needed. 1-3 times daily, Disp: , Rfl:   •  esomeprazole (nexIUM) 40 MG capsule, Take 1 capsule by mouth Daily., Disp: 30 capsule, Rfl: 11  •  ibuprofen (ADVIL,MOTRIN) 200 MG tablet, Take 200 mg by mouth Every 8 (Eight) Hours As Needed for Mild Pain ., Disp: , Rfl:   •  levothyroxine (SYNTHROID) 50 MCG tablet, Take 1 tablet by mouth Daily., Disp: 90 tablet, Rfl: 3  •  metFORMIN ER (GLUCOPHAGE-XR) 750 MG 24 hr tablet, Take 1 tablet by mouth twice daily, Disp: 180 tablet, Rfl: 3  •  rosuvastatin (CRESTOR) 5 MG tablet, Take 5 mg by mouth Every Other Day., Disp: , Rfl:   •  tamsulosin (FLOMAX) 0.4 MG capsule 24 hr capsule, TAKE 1 CAPSULE BY MOUTH ONCE A DAY AT BEDTIME, Disp: 90 capsule, Rfl: 3  •  traMADol (ULTRAM) 50 MG tablet, Take 1 tablet by mouth Daily As Needed for Moderate Pain ., Disp: 30 tablet, Rfl: 0  •  methylPREDNISolone (MEDROL) 4 MG dose pack, Take as directed on package instructions., Disp: 1  tablet, Rfl: 1     Vitals:    11/23/20 1121   BP: 142/80   Pulse: 77   Temp: 97.3 °F (36.3 °C)   SpO2: 98%         11/23/20  1121   Weight: 91.5 kg (201 lb 12.8 oz)     Patient's Body mass index is 24.56 kg/m². BMI is .      Physical Exam  Vitals signs and nursing note reviewed.   Constitutional:       General: He is not in acute distress.     Appearance: Normal appearance. He is well-developed.   HENT:      Head: Normocephalic and atraumatic.      Right Ear: External ear normal.      Left Ear: External ear normal.      Nose: Nose normal.   Eyes:      Extraocular Movements: Extraocular movements intact.      Conjunctiva/sclera: Conjunctivae normal.      Pupils: Pupils are equal, round, and reactive to light.   Neck:      Musculoskeletal: Normal range of motion and neck supple. No neck rigidity or muscular tenderness.   Cardiovascular:      Rate and Rhythm: Normal rate and regular rhythm.      Pulses: Normal pulses.      Heart sounds: Normal heart sounds.   Pulmonary:      Effort: Pulmonary effort is normal.      Breath sounds: Normal breath sounds.   Abdominal:      General: Bowel sounds are normal.      Palpations: Abdomen is soft.   Musculoskeletal: Normal range of motion.   Skin:     General: Skin is warm and dry.   Neurological:      General: No focal deficit present.      Mental Status: He is alert and oriented to person, place, and time.   Psychiatric:         Mood and Affect: Mood normal.         Behavior: Behavior normal.               Assessment/Plan   Diagnoses and all orders for this visit:    1. Nausea (Primary)  -     Ambulatory Referral to Gastroenterology    2. Benign essential HTN    3. Controlled type 2 diabetes mellitus without complication, without long-term current use of insulin (CMS/[facility])      At this point I have reviewed patient's films the AP and lateral lumbar spine with the exception of a few spurs thought is lumbar spine looked excellent I saw no lesions really did not see any evidence of  spondylosis spondylolisthesis.  Patient's checking his sugars and he states that he did not really have any major swings with the use of the steroid Dosepak.  I am not doing any further work-up or evaluation as is examination of his back is normal at this point.  Patient's blood pressure is adequately controlled

## 2020-11-24 RX ORDER — ROSUVASTATIN CALCIUM 5 MG/1
TABLET, COATED ORAL
Qty: 45 TABLET | Refills: 3 | Status: SHIPPED | OUTPATIENT
Start: 2020-11-24 | End: 2021-10-12 | Stop reason: SDUPTHER

## 2020-12-01 ENCOUNTER — OFFICE VISIT (OUTPATIENT)
Dept: GASTROENTEROLOGY | Facility: CLINIC | Age: 80
End: 2020-12-01

## 2020-12-01 VITALS
BODY MASS INDEX: 23.87 KG/M2 | SYSTOLIC BLOOD PRESSURE: 130 MMHG | TEMPERATURE: 97.7 F | DIASTOLIC BLOOD PRESSURE: 62 MMHG | WEIGHT: 196 LBS | HEART RATE: 68 BPM | HEIGHT: 76 IN | OXYGEN SATURATION: 98 %

## 2020-12-01 DIAGNOSIS — Z78.9 NONSMOKER: ICD-10-CM

## 2020-12-01 DIAGNOSIS — R11.0 NAUSEA: ICD-10-CM

## 2020-12-01 DIAGNOSIS — R10.13 DYSPEPSIA: Primary | ICD-10-CM

## 2020-12-01 PROCEDURE — 99204 OFFICE O/P NEW MOD 45 MIN: CPT | Performed by: CLINICAL NURSE SPECIALIST

## 2020-12-01 NOTE — PROGRESS NOTES
Caleb Wood  1940    12/1/2020  Chief Complaint   Patient presents with   • GI Problem     Nausea     Subjective   HPI  Caleb Wood is a 80 y.o. male who presents with a complaint of nausea and a flare in reflux over the past 3 weeks. He says that he eats cereal in the am special K he uses silk instead of milk bc it is zero sugar. He stopped using this and started using milk. He thinks that this may have triggered this. No alleviating factor. No melena. No BRBPR. No vomiting. Just a sick feeling that comes and goes.  He is on Metformin for his diabetes and has been for 8 years or longer. He says that his sugar has not been under control as of recently. synptoms are mild to moderate.   He has had reflux he says for approx 30 years. He takes Nexium for the management of this.    Past Medical History:   Diagnosis Date   • Diabetes mellitus (CMS/HCC)     Type 2   • Disease of thyroid gland    • GERD (gastroesophageal reflux disease)    • Hemorrhoids    • Hx of colonic polyps    • Hypercholesteremia      Past Surgical History:   Procedure Laterality Date   • CHOLECYSTECTOMY     • COLONOSCOPY  01/17/2012    Colon polyp tissue not retrieved repeat exam in 5 years   • COLONOSCOPY  01/06/2009    adenomatous polyp x1   • COLONOSCOPY N/A 6/1/2017    Tubular adenoma Transvere colon, Diverticulosis repeat exam in 5 years   • ENDOSCOPY  06/25/2013       • KNEE SURGERY     • SHOULDER SURGERY Right        Outpatient Medications Marked as Taking for the 12/1/20 encounter (Office Visit) with Deepti Brito APRN   Medication Sig Dispense Refill   • ALPRAZolam (XANAX) 0.5 MG tablet Take 1 tablet by mouth At Night As Needed for Anxiety. 30 tablet 5   • aspirin (ECOTRIN LOW STRENGTH) 81 MG EC tablet Take 81 mg by mouth Daily.     • coenzyme Q10 100 MG capsule Take 200 mg by mouth Every Evening.     • cyclobenzaprine (FLEXERIL) 10 MG tablet Take 1 tablet by mouth 3 (Three) Times a Day As Needed for Muscle Spasms. 30 tablet  1   • esomeprazole (nexIUM) 40 MG capsule Take 1 capsule by mouth Daily. 30 capsule 11   • ibuprofen (ADVIL,MOTRIN) 200 MG tablet Take 200 mg by mouth Every 8 (Eight) Hours As Needed for Mild Pain .     • levothyroxine (SYNTHROID) 50 MCG tablet Take 1 tablet by mouth Daily. 90 tablet 3   • metFORMIN ER (GLUCOPHAGE-XR) 750 MG 24 hr tablet Take 1 tablet by mouth twice daily 180 tablet 3   • rosuvastatin (CRESTOR) 5 MG tablet TAKE 1 TABLET BY MOUTH EVERY OTHER DAY 45 tablet 3   • tamsulosin (FLOMAX) 0.4 MG capsule 24 hr capsule TAKE 1 CAPSULE BY MOUTH ONCE A DAY AT BEDTIME 90 capsule 3   • traMADol (ULTRAM) 50 MG tablet Take 1 tablet by mouth Daily As Needed for Moderate Pain . 30 tablet 0     Allergies   Allergen Reactions   • Hydrocodone Rash   • Metformin Diarrhea     Social History     Socioeconomic History   • Marital status:      Spouse name: Not on file   • Number of children: Not on file   • Years of education: Not on file   • Highest education level: Not on file   Tobacco Use   • Smoking status: Never Smoker   • Smokeless tobacco: Never Used   Substance and Sexual Activity   • Alcohol use: No   • Drug use: No   • Sexual activity: Defer     Family History   Problem Relation Age of Onset   • No Known Problems Mother    • No Known Problems Father    • Colon cancer Neg Hx    • Colon polyps Neg Hx      Health Maintenance   Topic Date Due   • URINE MICROALBUMIN  1940   • TDAP/TD VACCINES (1 - Tdap) 08/10/1960   • ZOSTER VACCINE (1 of 2) 08/10/1991   • Pneumococcal Vaccine 65+ (1 of 1 - PPSV23) 08/10/2006   • HEPATITIS C SCREENING  03/29/2017   • ANNUAL WELLNESS VISIT  03/29/2017   • LIPID PANEL  03/29/2017   • DIABETIC EYE EXAM  03/29/2017   • HEMOGLOBIN A1C  09/19/2020   • COLONOSCOPY  06/01/2027   • INFLUENZA VACCINE  Completed     Review of Systems   Constitutional: Negative for activity change, appetite change, chills, diaphoresis, fatigue, fever and unexpected weight change.   HENT: Negative for  "ear pain, hearing loss, mouth sores, sore throat, trouble swallowing and voice change.    Eyes: Negative.    Respiratory: Negative for cough, choking, shortness of breath and wheezing.    Cardiovascular: Negative for chest pain and palpitations.   Gastrointestinal: Positive for nausea. Negative for abdominal pain, blood in stool, constipation, diarrhea and vomiting.   Endocrine: Negative for cold intolerance and heat intolerance.   Genitourinary: Negative for decreased urine volume, dysuria, frequency, hematuria and urgency.   Musculoskeletal: Negative for back pain, gait problem and myalgias.   Skin: Negative for color change, pallor and rash.   Allergic/Immunologic: Negative for food allergies and immunocompromised state.   Neurological: Negative for dizziness, tremors, seizures, syncope, weakness, light-headedness, numbness and headaches.   Hematological: Negative for adenopathy. Does not bruise/bleed easily.   Psychiatric/Behavioral: Negative for agitation and confusion. The patient is not nervous/anxious.    All other systems reviewed and are negative.    Objective   Vitals:    12/01/20 0849   BP: 130/62   Pulse: 68   Temp: 97.7 °F (36.5 °C)   SpO2: 98%   Weight: 88.9 kg (196 lb)   Height: 193 cm (76\")     Body mass index is 23.86 kg/m².  Physical Exam  Constitutional:       Appearance: He is well-developed.   HENT:      Head: Normocephalic and atraumatic.   Eyes:      Pupils: Pupils are equal, round, and reactive to light.   Neck:      Musculoskeletal: Normal range of motion and neck supple.      Trachea: No tracheal deviation.   Cardiovascular:      Rate and Rhythm: Normal rate and regular rhythm.      Heart sounds: Normal heart sounds. No murmur. No friction rub. No gallop.    Pulmonary:      Effort: Pulmonary effort is normal. No respiratory distress.      Breath sounds: Normal breath sounds. No wheezing or rales.   Chest:      Chest wall: No tenderness.   Abdominal:      General: Bowel sounds are normal. " There is no distension.      Palpations: Abdomen is soft. Abdomen is not rigid.      Tenderness: There is no abdominal tenderness. There is no guarding or rebound.   Musculoskeletal: Normal range of motion.         General: No tenderness or deformity.   Skin:     General: Skin is warm and dry.      Coloration: Skin is not pale.      Findings: No rash.   Neurological:      Mental Status: He is alert and oriented to person, place, and time.      Deep Tendon Reflexes: Reflexes are normal and symmetric.   Psychiatric:         Behavior: Behavior normal.         Thought Content: Thought content normal.         Judgment: Judgment normal.       Assessment/Plan   Diagnoses and all orders for this visit:    1. Dyspepsia (Primary)  -     Case Request; Standing  -     Follow Anesthesia Guidelines / Standing Orders; Future  -     Obtain Informed Consent; Future  -     Implement Anesthesia Orders Day of Procedure; Standing  -     Obtain Informed Consent; Standing  -     Case Request    2. Nausea    3. Nonsmoker    I discussed non pharmaceutical treatment of gerd.  This includes gradually losing weight to achieve ideal body wt., elevation of the head of bed by 4-6 inches, nothing to eat or drink 3 hours prior to lying down, avoiding tight clothing, stress reduction, tobacco cessation, reduction of alcohol intake, and dietary restrictions (avoiding caffeine, coffee, fatty foods, mints, chocolate, spicy foods and tomato based sauces as much as possible).    Continue Nexium  Manage diabetes better this can be a contributing factor.   Dairy products can be a source of his nausea    ESOPHAGOGASTRODUODENOSCOPY WITH ANESTHESIA (N/A)  Part of this note may be an electronic transcription/translation of spoken language to printed text using the Dragon Dictation System.  Body mass index is 23.86 kg/m².  Return in about 1 year (around 12/1/2021).    Patient's Body mass index is 23.86 kg/m². BMI is within normal parameters. No follow-up  required..      All risks, benefits, alternatives, and indications of colonoscopy and/or Endoscopy procedure have been discussed with the patient. Risks to include perforation of the colon requiring possible surgery or colostomy, risk of bleeding from biopsies or removal of colon tissue, possibility of missing a colon polyp or cancer, or adverse drug reaction.  Benefits to include the diagnosis and management of disease of the colon and rectum. Alternatives to include barium enema, radiographic evaluation, lab testing or no intervention. Pt verbalizes understanding and agrees.     Deepti Brito, APRN  12/1/2020  09:18 CST      Obesity, Adult  Obesity is the condition of having too much total body fat. Being overweight or obese means that your weight is greater than what is considered healthy for your body size. Obesity is determined by a measurement called BMI. BMI is an estimate of body fat and is calculated from height and weight. For adults, a BMI of 30 or higher is considered obese.  Obesity can eventually lead to other health concerns and major illnesses, including:  · Stroke.  · Coronary artery disease (CAD).  · Type 2 diabetes.  · Some types of cancer, including cancers of the colon, breast, uterus, and gallbladder.  · Osteoarthritis.  · High blood pressure (hypertension).  · High cholesterol.  · Sleep apnea.  · Gallbladder stones.  · Infertility problems.  What are the causes?  The main cause of obesity is taking in (consuming) more calories than your body uses for energy. Other factors that contribute to this condition may include:  · Being born with genes that make you more likely to become obese.  · Having a medical condition that causes obesity. These conditions include:  ¨ Hypothyroidism.  ¨ Polycystic ovarian syndrome (PCOS).  ¨ Binge-eating disorder.  ¨ Cushing syndrome.  · Taking certain medicines, such as steroids, antidepressants, and seizure medicines.  · Not being physically active  (sedentary lifestyle).  · Living where there are limited places to exercise safely or buy healthy foods.  · Not getting enough sleep.  What increases the risk?  The following factors may increase your risk of this condition:  · Having a family history of obesity.  · Being a woman of -American descent.  · Being a man of  descent.  What are the signs or symptoms?  Having excessive body fat is the main symptom of this condition.  How is this diagnosed?  This condition may be diagnosed based on:  · Your symptoms.  · Your medical history.  · A physical exam. Your health care provider may measure:  ¨ Your BMI. If you are an adult with a BMI between 25 and less than 30, you are considered overweight. If you are an adult with a BMI of 30 or higher, you are considered obese.  ¨ The distances around your hips and your waist (circumferences). These may be compared to each other to help diagnose your condition.  ¨ Your skinfold thickness. Your health care provider may gently pinch a fold of your skin and measure it.  How is this treated?  Treatment for this condition often includes changing your lifestyle. Treatment may include some or all of the following:  · Dietary changes. Work with your health care provider and a dietitian to set a weight-loss goal that is healthy and reasonable for you. Dietary changes may include eating:  ¨ Smaller portions. A portion size is the amount of a particular food that is healthy for you to eat at one time. This varies from person to person.  ¨ Low-calorie or low-fat options.  ¨ More whole grains, fruits, and vegetables.  · Regular physical activity. This may include aerobic activity (cardio) and strength training.  · Medicine to help you lose weight. Your health care provider may prescribe medicine if you are unable to lose 1 pound a week after 6 weeks of eating more healthily and doing more physical activity.  · Surgery. Surgical options may include gastric banding and gastric  bypass. Surgery may be done if:  ¨ Other treatments have not helped to improve your condition.  ¨ You have a BMI of 40 or higher.  ¨ You have life-threatening health problems related to obesity.  Follow these instructions at home:     Eating and drinking     · Follow recommendations from your health care provider about what you eat and drink. Your health care provider may advise you to:  ¨ Limit fast foods, sweets, and processed snack foods.  ¨ Choose low-fat options, such as low-fat milk instead of whole milk.  ¨ Eat 5 or more servings of fruits or vegetables every day.  ¨ Eat at home more often. This gives you more control over what you eat.  ¨ Choose healthy foods when you eat out.  ¨ Learn what a healthy portion size is.  ¨ Keep low-fat snacks on hand.  ¨ Avoid sugary drinks, such as soda, fruit juice, iced tea sweetened with sugar, and flavored milk.  ¨ Eat a healthy breakfast.  · Drink enough water to keep your urine clear or pale yellow.  · Do not go without eating for long periods of time (do not fast) or follow a fad diet. Fasting and fad diets can be unhealthy and even dangerous.  Physical Activity   · Exercise regularly, as told by your health care provider. Ask your health care provider what types of exercise are safe for you and how often you should exercise.  · Warm up and stretch before being active.  · Cool down and stretch after being active.  · Rest between periods of activity.  Lifestyle   · Limit the time that you spend in front of your TV, computer, or video game system.  · Find ways to reward yourself that do not involve food.  · Limit alcohol intake to no more than 1 drink a day for nonpregnant women and 2 drinks a day for men. One drink equals 12 oz of beer, 5 oz of wine, or 1½ oz of hard liquor.  General instructions   · Keep a weight loss journal to keep track of the food you eat and how much you exercise you get.  · Take over-the-counter and prescription medicines only as told by your  health care provider.  · Take vitamins and supplements only as told by your health care provider.  · Consider joining a support group. Your health care provider may be able to recommend a support group.  · Keep all follow-up visits as told by your health care provider. This is important.  Contact a health care provider if:  · You are unable to meet your weight loss goal after 6 weeks of dietary and lifestyle changes.  This information is not intended to replace advice given to you by your health care provider. Make sure you discuss any questions you have with your health care provider.  Document Released: 01/25/2006 Document Revised: 05/22/2017 Document Reviewed: 10/05/2016  [employer] Interactive Patient Education © 2017 [employer]      If you smoke or use tobacco, 4 minutes reading provided  Steps to Quit Smoking  Smoking tobacco can be harmful to your health and can affect almost every organ in your body. Smoking puts you, and those around you, at risk for developing many serious chronic diseases. Quitting smoking is difficult, but it is one of the best things that you can do for your health. It is never too late to quit.  What are the benefits of quitting smoking?  When you quit smoking, you lower your risk of developing serious diseases and conditions, such as:  · Lung cancer or lung disease, such as COPD.  · Heart disease.  · Stroke.  · Heart attack.  · Infertility.  · Osteoporosis and bone fractures.  Additionally, symptoms such as coughing, wheezing, and shortness of breath may get better when you quit. You may also find that you get sick less often because your body is stronger at fighting off colds and infections. If you are pregnant, quitting smoking can help to reduce your chances of having a baby of low birth weight.  How do I get ready to quit?  When you decide to quit smoking, create a plan to make sure that you are successful. Before you quit:  · Pick a date to quit. Set a date within the next two  weeks to give you time to prepare.  · Write down the reasons why you are quitting. Keep this list in places where you will see it often, such as on your bathroom mirror or in your car or wallet.  · Identify the people, places, things, and activities that make you want to smoke (triggers) and avoid them. Make sure to take these actions:  ¨ Throw away all cigarettes at home, at work, and in your car.  ¨ Throw away smoking accessories, such as ashtrays and lighters.  ¨ Clean your car and make sure to empty the ashtray.  ¨ Clean your home, including curtains and carpets.  · Tell your family, friends, and coworkers that you are quitting. Support from your loved ones can make quitting easier.  · Talk with your health care provider about your options for quitting smoking.  · Find out what treatment options are covered by your health insurance.  What strategies can I use to quit smoking?  Talk with your healthcare provider about different strategies to quit smoking. Some strategies include:  · Quitting smoking altogether instead of gradually lessening how much you smoke over a period of time. Research shows that quitting “cold turkey” is more successful than gradually quitting.  · Attending in-person counseling to help you build problem-solving skills. You are more likely to have success in quitting if you attend several counseling sessions. Even short sessions of 10 minutes can be effective.  · Finding resources and support systems that can help you to quit smoking and remain smoke-free after you quit. These resources are most helpful when you use them often. They can include:  ¨ Online chats with a counselor.  ¨ Telephone quitlines.  ¨ Printed self-help materials.  ¨ Support groups or group counseling.  ¨ Text messaging programs.  ¨ Mobile phone applications.  · Taking medicines to help you quit smoking. (If you are pregnant or breastfeeding, talk with your health care provider first.) Some medicines contain nicotine and  some do not. Both types of medicines help with cravings, but the medicines that include nicotine help to relieve withdrawal symptoms. Your health care provider may recommend:  ¨ Nicotine patches, gum, or lozenges.  ¨ Nicotine inhalers or sprays.  ¨ Non-nicotine medicine that is taken by mouth.  Talk with your health care provider about combining strategies, such as taking medicines while you are also receiving in-person counseling. Using these two strategies together makes you more likely to succeed in quitting than if you used either strategy on its own.  If you are pregnant or breastfeeding, talk with your health care provider about finding counseling or other support strategies to quit smoking. Do not take medicine to help you quit smoking unless told to do so by your health care provider.  What things can I do to make it easier to quit?  Quitting smoking might feel overwhelming at first, but there is a lot that you can do to make it easier. Take these important actions:  · Reach out to your family and friends and ask that they support and encourage you during this time. Call telephone quitlines, reach out to support groups, or work with a counselor for support.  · Ask people who smoke to avoid smoking around you.  · Avoid places that trigger you to smoke, such as bars, parties, or smoke-break areas at work.  · Spend time around people who do not smoke.  · Lessen stress in your life, because stress can be a smoking trigger for some people. To lessen stress, try:  ¨ Exercising regularly.  ¨ Deep-breathing exercises.  ¨ Yoga.  ¨ Meditating.  ¨ Performing a body scan. This involves closing your eyes, scanning your body from head to toe, and noticing which parts of your body are particularly tense. Purposefully relax the muscles in those areas.  · Download or purchase mobile phone or tablet apps (applications) that can help you stick to your quit plan by providing reminders, tips, and encouragement. There are many  free apps, such as QuitGuide from the CDC (Centers for Disease Control and Prevention). You can find other support for quitting smoking (smoking cessation) through smokefree.gov and other websites.  How will I feel when I quit smoking?  Within the first 24 hours of quitting smoking, you may start to feel some withdrawal symptoms. These symptoms are usually most noticeable 2-3 days after quitting, but they usually do not last beyond 2-3 weeks. Changes or symptoms that you might experience include:  · Mood swings.  · Restlessness, anxiety, or irritation.  · Difficulty concentrating.  · Dizziness.  · Strong cravings for sugary foods in addition to nicotine.  · Mild weight gain.  · Constipation.  · Nausea.  · Coughing or a sore throat.  · Changes in how your medicines work in your body.  · A depressed mood.  · Difficulty sleeping (insomnia).  After the first 2-3 weeks of quitting, you may start to notice more positive results, such as:  · Improved sense of smell and taste.  · Decreased coughing and sore throat.  · Slower heart rate.  · Lower blood pressure.  · Clearer skin.  · The ability to breathe more easily.  · Fewer sick days.  Quitting smoking is very challenging for most people. Do not get discouraged if you are not successful the first time. Some people need to make many attempts to quit before they achieve long-term success. Do your best to stick to your quit plan, and talk with your health care provider if you have any questions or concerns.  This information is not intended to replace advice given to you by your health care provider. Make sure you discuss any questions you have with your health care provider.  Document Released: 12/12/2002 Document Revised: 08/15/2017 Document Reviewed: 05/03/2016  [employer] Interactive Patient Education © 2017 [employer]

## 2020-12-17 ENCOUNTER — TELEPHONE (OUTPATIENT)
Dept: INTERNAL MEDICINE | Facility: CLINIC | Age: 80
End: 2020-12-17

## 2020-12-17 DIAGNOSIS — E78.00 PURE HYPERCHOLESTEROLEMIA: ICD-10-CM

## 2020-12-17 DIAGNOSIS — Z11.59 NEED FOR HEPATITIS C SCREENING TEST: ICD-10-CM

## 2020-12-17 DIAGNOSIS — E11.9 CONTROLLED TYPE 2 DIABETES MELLITUS WITHOUT COMPLICATION, WITHOUT LONG-TERM CURRENT USE OF INSULIN: ICD-10-CM

## 2020-12-17 DIAGNOSIS — E03.9 ACQUIRED HYPOTHYROIDISM: ICD-10-CM

## 2020-12-17 DIAGNOSIS — Z12.5 SCREENING PSA (PROSTATE SPECIFIC ANTIGEN): Primary | ICD-10-CM

## 2020-12-17 DIAGNOSIS — I10 BENIGN ESSENTIAL HTN: ICD-10-CM

## 2020-12-17 NOTE — TELEPHONE ENCOUNTER
PATIENT CALLED IN AND WAS CONCERNED ABOUT HIS UPCOMING PHYSICAL - HE SAID THAT HE THOUGHT HE SHOULD HAVE LABS DONE.     HE IS ASKING IF SOMEONE CAN CALL HIM AND ADVISE IF LABS ARE REQUIRED.     CALL BACK: [phone]

## 2020-12-25 ENCOUNTER — RESULTS ENCOUNTER (OUTPATIENT)
Dept: INTERNAL MEDICINE | Facility: CLINIC | Age: 80
End: 2020-12-25

## 2020-12-25 DIAGNOSIS — E78.00 PURE HYPERCHOLESTEROLEMIA: ICD-10-CM

## 2020-12-25 DIAGNOSIS — I10 BENIGN ESSENTIAL HTN: ICD-10-CM

## 2020-12-25 DIAGNOSIS — E03.9 ACQUIRED HYPOTHYROIDISM: ICD-10-CM

## 2020-12-25 DIAGNOSIS — Z11.59 NEED FOR HEPATITIS C SCREENING TEST: ICD-10-CM

## 2020-12-25 DIAGNOSIS — E11.9 CONTROLLED TYPE 2 DIABETES MELLITUS WITHOUT COMPLICATION, WITHOUT LONG-TERM CURRENT USE OF INSULIN (HCC): ICD-10-CM

## 2020-12-25 DIAGNOSIS — Z12.5 SCREENING PSA (PROSTATE SPECIFIC ANTIGEN): ICD-10-CM

## 2020-12-29 LAB
ALBUMIN SERPL-MCNC: 4 G/DL (ref 3.5–5.2)
ALBUMIN/GLOB SERPL: 1.9 G/DL
ALP SERPL-CCNC: 60 U/L (ref 39–117)
ALT SERPL-CCNC: 14 U/L (ref 1–41)
APPEARANCE UR: CLEAR
AST SERPL-CCNC: 12 U/L (ref 1–40)
BACTERIA #/AREA URNS HPF: NORMAL /HPF
BASOPHILS # BLD AUTO: 0.02 10*3/MM3 (ref 0–0.2)
BASOPHILS NFR BLD AUTO: 0.5 % (ref 0–1.5)
BILIRUB SERPL-MCNC: 0.5 MG/DL (ref 0–1.2)
BILIRUB UR QL STRIP: NEGATIVE
BUN SERPL-MCNC: 14 MG/DL (ref 8–23)
BUN/CREAT SERPL: 13.6 (ref 7–25)
CALCIUM SERPL-MCNC: 9.1 MG/DL (ref 8.6–10.5)
CASTS URNS MICRO: NORMAL
CHLORIDE SERPL-SCNC: 104 MMOL/L (ref 98–107)
CHOLEST SERPL-MCNC: 135 MG/DL (ref 0–200)
CO2 SERPL-SCNC: 30 MMOL/L (ref 22–29)
COLOR UR: YELLOW
CREAT SERPL-MCNC: 1.03 MG/DL (ref 0.76–1.27)
EOSINOPHIL # BLD AUTO: 0.21 10*3/MM3 (ref 0–0.4)
EOSINOPHIL NFR BLD AUTO: 4.8 % (ref 0.3–6.2)
EPI CELLS #/AREA URNS HPF: NORMAL /HPF
ERYTHROCYTE [DISTWIDTH] IN BLOOD BY AUTOMATED COUNT: 12.3 % (ref 12.3–15.4)
GLOBULIN SER CALC-MCNC: 2.1 GM/DL
GLUCOSE SERPL-MCNC: 145 MG/DL (ref 65–99)
GLUCOSE UR QL: ABNORMAL
HBA1C MFR BLD: 7.4 % (ref 4.8–5.6)
HCT VFR BLD AUTO: 37.8 % (ref 37.5–51)
HCV AB S/CO SERPL IA: <0.1 S/CO RATIO (ref 0–0.9)
HDLC SERPL-MCNC: 36 MG/DL (ref 40–60)
HGB BLD-MCNC: 13 G/DL (ref 13–17.7)
HGB UR QL STRIP: NEGATIVE
IMM GRANULOCYTES # BLD AUTO: 0.01 10*3/MM3 (ref 0–0.05)
IMM GRANULOCYTES NFR BLD AUTO: 0.2 % (ref 0–0.5)
KETONES UR QL STRIP: NEGATIVE
LDLC SERPL CALC-MCNC: 78 MG/DL (ref 0–100)
LEUKOCYTE ESTERASE UR QL STRIP: NEGATIVE
LYMPHOCYTES # BLD AUTO: 1.14 10*3/MM3 (ref 0.7–3.1)
LYMPHOCYTES NFR BLD AUTO: 26.1 % (ref 19.6–45.3)
MCH RBC QN AUTO: 32.8 PG (ref 26.6–33)
MCHC RBC AUTO-ENTMCNC: 34.4 G/DL (ref 31.5–35.7)
MCV RBC AUTO: 95.5 FL (ref 79–97)
MICROALBUMIN UR-MCNC: <3 UG/ML
MONOCYTES # BLD AUTO: 0.39 10*3/MM3 (ref 0.1–0.9)
MONOCYTES NFR BLD AUTO: 8.9 % (ref 5–12)
NEUTROPHILS # BLD AUTO: 2.6 10*3/MM3 (ref 1.7–7)
NEUTROPHILS NFR BLD AUTO: 59.5 % (ref 42.7–76)
NITRITE UR QL STRIP: NEGATIVE
NRBC BLD AUTO-RTO: 0 /100 WBC (ref 0–0.2)
PH UR STRIP: 6.5 [PH] (ref 5–8)
PLATELET # BLD AUTO: 155 10*3/MM3 (ref 140–450)
POTASSIUM SERPL-SCNC: 4.6 MMOL/L (ref 3.5–5.2)
PROT SERPL-MCNC: 6.1 G/DL (ref 6–8.5)
PROT UR QL STRIP: NEGATIVE
PSA SERPL-MCNC: 0.69 NG/ML (ref 0–4)
RBC # BLD AUTO: 3.96 10*6/MM3 (ref 4.14–5.8)
RBC #/AREA URNS HPF: NORMAL /HPF
SODIUM SERPL-SCNC: 142 MMOL/L (ref 136–145)
SP GR UR: 1.02 (ref 1–1.03)
TRIGL SERPL-MCNC: 115 MG/DL (ref 0–150)
TSH SERPL DL<=0.005 MIU/L-ACNC: 2.96 UIU/ML (ref 0.27–4.2)
URATE SERPL-MCNC: 3.7 MG/DL (ref 3.4–7)
UROBILINOGEN UR STRIP-MCNC: ABNORMAL MG/DL
VLDLC SERPL CALC-MCNC: 21 MG/DL (ref 5–40)
WBC # BLD AUTO: 4.37 10*3/MM3 (ref 3.4–10.8)
WBC #/AREA URNS HPF: NORMAL /HPF

## 2021-01-04 ENCOUNTER — OFFICE VISIT (OUTPATIENT)
Dept: INTERNAL MEDICINE | Facility: CLINIC | Age: 81
End: 2021-01-04

## 2021-01-04 VITALS
WEIGHT: 210.4 LBS | HEART RATE: 89 BPM | SYSTOLIC BLOOD PRESSURE: 152 MMHG | OXYGEN SATURATION: 100 % | DIASTOLIC BLOOD PRESSURE: 82 MMHG | BODY MASS INDEX: 25.62 KG/M2 | HEIGHT: 76 IN | TEMPERATURE: 97.5 F

## 2021-01-04 DIAGNOSIS — I10 BENIGN ESSENTIAL HTN: ICD-10-CM

## 2021-01-04 DIAGNOSIS — R35.1 BENIGN PROSTATIC HYPERPLASIA WITH NOCTURIA: Primary | ICD-10-CM

## 2021-01-04 DIAGNOSIS — N40.1 BENIGN PROSTATIC HYPERPLASIA WITH NOCTURIA: Primary | ICD-10-CM

## 2021-01-04 DIAGNOSIS — E11.9 CONTROLLED TYPE 2 DIABETES MELLITUS WITHOUT COMPLICATION, WITHOUT LONG-TERM CURRENT USE OF INSULIN (HCC): ICD-10-CM

## 2021-01-04 PROCEDURE — 99214 OFFICE O/P EST MOD 30 MIN: CPT | Performed by: INTERNAL MEDICINE

## 2021-01-04 PROCEDURE — G0439 PPPS, SUBSEQ VISIT: HCPCS | Performed by: INTERNAL MEDICINE

## 2021-01-04 RX ORDER — GLIPIZIDE 2.5 MG/1
2.5 TABLET, EXTENDED RELEASE ORAL DAILY
Qty: 30 TABLET | Refills: 11 | Status: SHIPPED | OUTPATIENT
Start: 2021-01-04 | End: 2022-01-31 | Stop reason: SDUPTHER

## 2021-01-04 NOTE — PROGRESS NOTES
The ABCs of the Annual Wellness Visit  Initial Medicare Wellness Visit    Chief Complaint   Patient presents with   • Medicare Wellness-Initial Visit       Subjective   History of Present Illness:  Caleb Wood is a 80 y.o. male who presents for an Initial Medicare Wellness Visit.    HEALTH RISK ASSESSMENT    Recent Hospitalizations:  No hospitalization(s) within the last year.    Current Medical Providers:  Patient Care Team:  Darshan Garcias MD as PCP - General (Internal Medicine)    Smoking Status:  Social History     Tobacco Use   Smoking Status Never Smoker   Smokeless Tobacco Never Used       Alcohol Consumption:  Social History     Substance and Sexual Activity   Alcohol Use No       Depression Screen:   PHQ-2/PHQ-9 Depression Screening 1/4/2021   Little interest or pleasure in doing things 0   Feeling down, depressed, or hopeless 1   Trouble falling or staying asleep, or sleeping too much -   Feeling tired or having little energy -   Poor appetite or overeating -   Feeling bad about yourself - or that you are a failure or have let yourself or your family down -   Trouble concentrating on things, such as reading the newspaper or watching television -   Moving or speaking so slowly that other people could have noticed. Or the opposite - being so fidgety or restless that you have been moving around a lot more than usual -   Thoughts that you would be better off dead, or of hurting yourself in some way -   Total Score 1   If you checked off any problems, how difficult have these problems made it for you to do your work, take care of things at home, or get along with other people? -       Fall Risk Screen:  STEADI Fall Risk Assessment was completed, and patient is at LOW risk for falls.Assessment completed on:1/4/2021    Health Habits and Functional and Cognitive Screening:  Functional & Cognitive Status 1/4/2021   Do you have difficulty preparing food and eating? No   Do you have difficulty bathing yourself,  getting dressed or grooming yourself? No   Do you have difficulty using the toilet? No   Do you have difficulty moving around from place to place? No   Do you have trouble with steps or getting out of a bed or a chair? No   Current Diet Well Balanced Diet   Do you need help using the phone?  No   Are you deaf or do you have serious difficulty hearing?  No   Do you need help with transportation? No   Do you need help shopping? No   Do you need help preparing meals?  No   Do you need help with housework?  No   Do you need help with laundry? No   Do you need help taking your medications? No   Do you need help managing money? No   Do you ever drive or ride in a car without wearing a seat belt? No   Have you felt unusual stress, anger or loneliness in the last month? Yes   Who do you live with? Alone   If you need help, do you have trouble finding someone available to you? No   Have you been bothered in the last four weeks by sexual problems? No   Do you have difficulty concentrating, remembering or making decisions? No         Does the patient have evidence of cognitive impairment? No    Asprin use counseling:Taking ASA appropriately as indicated    Age-appropriate Screening Schedule:  Refer to the list below for future screening recommendations based on patient's age, sex and/or medical conditions. Orders for these recommended tests are listed in the plan section. The patient has been provided with a written plan.    Health Maintenance   Topic Date Due   • TDAP/TD VACCINES (1 - Tdap) 08/10/1960   • ZOSTER VACCINE (1 of 2) 08/10/1991   • DIABETIC EYE EXAM  03/29/2017   • HEMOGLOBIN A1C  06/28/2021   • LIPID PANEL  12/28/2021   • URINE MICROALBUMIN  12/28/2021   • COLONOSCOPY  06/01/2027   • INFLUENZA VACCINE  Completed          The following portions of the patient's history were reviewed and updated as appropriate: allergies, current medications, past family history, past medical history, past social history, past  surgical history and problem list.    Outpatient Medications Prior to Visit   Medication Sig Dispense Refill   • ALPRAZolam (XANAX) 0.5 MG tablet Take 1 tablet by mouth At Night As Needed for Anxiety. 30 tablet 5   • aspirin (ECOTRIN LOW STRENGTH) 81 MG EC tablet Take 81 mg by mouth Daily.     • coenzyme Q10 100 MG capsule Take 200 mg by mouth Every Evening.     • diclofenac (Voltaren) 1 % gel gel Apply 4 g topically to the appropriate area as directed 4 (Four) Times a Day As Needed. 1-3 times daily     • esomeprazole (nexIUM) 40 MG capsule Take 1 capsule by mouth Daily. 30 capsule 11   • ibuprofen (ADVIL,MOTRIN) 200 MG tablet Take 200 mg by mouth Every 8 (Eight) Hours As Needed for Mild Pain .     • levothyroxine (SYNTHROID) 50 MCG tablet Take 1 tablet by mouth Daily. 90 tablet 3   • metFORMIN ER (GLUCOPHAGE-XR) 750 MG 24 hr tablet Take 1 tablet by mouth twice daily 180 tablet 3   • rosuvastatin (CRESTOR) 5 MG tablet TAKE 1 TABLET BY MOUTH EVERY OTHER DAY 45 tablet 3   • tamsulosin (FLOMAX) 0.4 MG capsule 24 hr capsule TAKE 1 CAPSULE BY MOUTH ONCE A DAY AT BEDTIME 90 capsule 3   • traMADol (ULTRAM) 50 MG tablet Take 1 tablet by mouth Daily As Needed for Moderate Pain . 30 tablet 0   • cyclobenzaprine (FLEXERIL) 10 MG tablet Take 1 tablet by mouth 3 (Three) Times a Day As Needed for Muscle Spasms. 30 tablet 1     No facility-administered medications prior to visit.        Patient Active Problem List   Diagnosis   • Controlled type 2 diabetes mellitus without complication, without long-term current use of insulin (CMS/HCC)   • Hx of colonic polyps   • Acquired hypothyroidism   • Benign essential HTN   • Calculus of kidney   • Hematuria syndrome   • Hyperlipidemia   • Neutropenia (CMS/HCC)   • Neck pain   • Situational mixed anxiety and depressive disorder   • Diabetes mellitus without complication (CMS/HCC)   • History of colonic polyps   • Major depressive disorder with single episode, in partial remission (CMS/HCC)  "  • Chronic bilateral low back pain without sciatica   • Neuropathy, arm, right   • Dyspepsia       Advanced Care Planning:  ACP discussion was held with the patient during this visit. Patient has an advance directive in EMR which is still valid.     Review of Systems   Constitutional: Negative for activity change, appetite change and chills.   HENT: Negative for congestion, ear pain and facial swelling.    Eyes: Negative for pain, discharge and itching.   Respiratory: Negative for apnea, cough and shortness of breath.    Cardiovascular: Negative for chest pain, palpitations and leg swelling.   Gastrointestinal: Negative for abdominal distention, abdominal pain and anal bleeding.   Endocrine: Negative for cold intolerance and heat intolerance.   Genitourinary: Positive for frequency and urgency. Negative for difficulty urinating, dysuria and flank pain.   Musculoskeletal: Negative for arthralgias, back pain and joint swelling.   Skin: Negative for color change, pallor and rash.   Allergic/Immunologic: Negative for environmental allergies and food allergies.   Neurological: Negative for dizziness and facial asymmetry.   Hematological: Negative for adenopathy. Does not bruise/bleed easily.   Psychiatric/Behavioral: Negative for agitation, behavioral problems and confusion.       Compared to one year ago, the patient feels his physical health is the same.  Compared to one year ago, the patient feels his mental health is the same.    Reviewed chart for potential of high risk medication in the elderly: yes  Reviewed chart for potential of harmful drug interactions in the elderly:yes    Objective         Vitals:    01/04/21 1352   BP: 152/82   BP Location: Left arm   Patient Position: Sitting   Cuff Size: Adult   Pulse: 89   Temp: 97.5 °F (36.4 °C)   TempSrc: Temporal   SpO2: 100%   Weight: 95.4 kg (210 lb 6.4 oz)   Height: 193 cm (76\")   PainSc:   1   PainLoc: Back  Comment: Lower back. Took 600mg of Advil.       Body " mass index is 25.61 kg/m².  Discussed the patient's BMI with him. The BMI is in the acceptable range.    Physical Exam  Vitals signs and nursing note reviewed.   Constitutional:       General: He is not in acute distress.     Appearance: Normal appearance. He is well-developed.   HENT:      Head: Normocephalic and atraumatic.      Right Ear: External ear normal.      Left Ear: External ear normal.      Nose: Nose normal.   Eyes:      Extraocular Movements: Extraocular movements intact.      Conjunctiva/sclera: Conjunctivae normal.      Pupils: Pupils are equal, round, and reactive to light.   Neck:      Musculoskeletal: Normal range of motion and neck supple. No neck rigidity or muscular tenderness.   Cardiovascular:      Rate and Rhythm: Normal rate and regular rhythm.      Pulses: Normal pulses.      Heart sounds: Normal heart sounds.   Pulmonary:      Effort: Pulmonary effort is normal.      Breath sounds: Normal breath sounds.   Abdominal:      General: Bowel sounds are normal.      Palpations: Abdomen is soft.   Musculoskeletal: Normal range of motion.   Skin:     General: Skin is warm and dry.   Neurological:      General: No focal deficit present.      Mental Status: He is alert and oriented to person, place, and time.   Psychiatric:         Mood and Affect: Mood normal.         Behavior: Behavior normal.         Lab Results   Component Value Date     (H) 12/28/2020    CHLPL 135 12/28/2020    TRIG 115 12/28/2020    HDL 36 (L) 12/28/2020    LDL 78 12/28/2020    VLDL 21 12/28/2020    HGBA1C 7.40 (H) 12/28/2020        Assessment/Plan   Medicare Risks and Personalized Health Plan  CMS Preventative Services Quick Reference  Advance Directive Discussion  Cardiovascular risk  Colon Cancer Screening  Immunizations Discussed/Encouraged (specific immunizations; Shingrix )  Prostate Cancer Screening     The above risks/problems have been discussed with the patient.  Pertinent information has been shared with the  patient in the After Visit Summary.  Follow up plans and orders are seen below in the Assessment/Plan Section.    Diagnoses and all orders for this visit:    1. Benign prostatic hyperplasia with nocturia (Primary)  -     Ambulatory Referral to Urology    2. Benign essential HTN    3. Controlled type 2 diabetes mellitus without complication, without long-term current use of insulin (CMS/[facility])    Other orders  -     glipizide (Glucotrol XL) 2.5 MG 24 hr tablet; Take 1 tablet by mouth Daily.  Dispense: 30 tablet; Refill: 11      Follow Up:  No follow-ups on file.     An After Visit Summary and PPPS were given to the patient.     At this point patient's diabetes is out of control and regard to go ahead and add glipizide 2.5 mg daily. Also his blood pressure seems to be slightly elevated we talked about that however I am not going to change his medications today he will work with his diet and exercise program. In regard to his prostate issues he seems to be having more symptoms now he is wanting to get back to urology is aware make an amatory referral at this point

## 2021-01-11 ENCOUNTER — TRANSCRIBE ORDERS (OUTPATIENT)
Dept: LAB | Facility: HOSPITAL | Age: 81
End: 2021-01-11

## 2021-01-11 DIAGNOSIS — Z01.818 PREOP TESTING: Primary | ICD-10-CM

## 2021-01-18 ENCOUNTER — APPOINTMENT (OUTPATIENT)
Dept: LAB | Facility: HOSPITAL | Age: 81
End: 2021-01-18

## 2021-01-26 ENCOUNTER — LAB (OUTPATIENT)
Dept: LAB | Facility: HOSPITAL | Age: 81
End: 2021-01-26

## 2021-01-26 LAB — SARS-COV-2 ORF1AB RESP QL NAA+PROBE: NOT DETECTED

## 2021-01-26 PROCEDURE — C9803 HOPD COVID-19 SPEC COLLECT: HCPCS | Performed by: INTERNAL MEDICINE

## 2021-01-26 PROCEDURE — U0004 COV-19 TEST NON-CDC HGH THRU: HCPCS | Performed by: INTERNAL MEDICINE

## 2021-01-29 ENCOUNTER — ANESTHESIA EVENT (OUTPATIENT)
Dept: GASTROENTEROLOGY | Facility: HOSPITAL | Age: 81
End: 2021-01-29

## 2021-01-29 ENCOUNTER — HOSPITAL ENCOUNTER (OUTPATIENT)
Facility: HOSPITAL | Age: 81
Setting detail: HOSPITAL OUTPATIENT SURGERY
Discharge: HOME | End: 2021-01-29
Attending: INTERNAL MEDICINE | Admitting: INTERNAL MEDICINE

## 2021-01-29 ENCOUNTER — ANESTHESIA (OUTPATIENT)
Dept: GASTROENTEROLOGY | Facility: HOSPITAL | Age: 81
End: 2021-01-29

## 2021-01-29 VITALS
WEIGHT: 204 LBS | HEIGHT: 76 IN | BODY MASS INDEX: 24.84 KG/M2 | RESPIRATION RATE: 17 BRPM | TEMPERATURE: 98.5 F | OXYGEN SATURATION: 100 % | HEART RATE: 57 BPM | SYSTOLIC BLOOD PRESSURE: 132 MMHG | DIASTOLIC BLOOD PRESSURE: 74 MMHG

## 2021-01-29 DIAGNOSIS — R10.13 DYSPEPSIA: ICD-10-CM

## 2021-01-29 LAB — GLUCOSE BLDC GLUCOMTR-MCNC: 121 MG/DL (ref 70–130)

## 2021-01-29 PROCEDURE — 87081 CULTURE SCREEN ONLY: CPT | Performed by: INTERNAL MEDICINE

## 2021-01-29 PROCEDURE — 82962 GLUCOSE BLOOD TEST: CPT

## 2021-01-29 PROCEDURE — 43235 EGD DIAGNOSTIC BRUSH WASH: CPT | Performed by: INTERNAL MEDICINE

## 2021-01-29 PROCEDURE — 25010000002 PROPOFOL 10 MG/ML EMULSION: Performed by: NURSE ANESTHETIST, CERTIFIED REGISTERED

## 2021-01-29 RX ORDER — LIDOCAINE HYDROCHLORIDE 20 MG/ML
INJECTION, SOLUTION EPIDURAL; INFILTRATION; INTRACAUDAL; PERINEURAL AS NEEDED
Status: DISCONTINUED | OUTPATIENT
Start: 2021-01-29 | End: 2021-01-29 | Stop reason: SURG

## 2021-01-29 RX ORDER — PROPOFOL 10 MG/ML
VIAL (ML) INTRAVENOUS AS NEEDED
Status: DISCONTINUED | OUTPATIENT
Start: 2021-01-29 | End: 2021-01-29 | Stop reason: SURG

## 2021-01-29 RX ORDER — SODIUM CHLORIDE 0.9 % (FLUSH) 0.9 %
10 SYRINGE (ML) INJECTION AS NEEDED
Status: DISCONTINUED | OUTPATIENT
Start: 2021-01-29 | End: 2021-01-29 | Stop reason: HOSPADM

## 2021-01-29 RX ORDER — SODIUM CHLORIDE 9 MG/ML
500 INJECTION, SOLUTION INTRAVENOUS CONTINUOUS PRN
Status: DISCONTINUED | OUTPATIENT
Start: 2021-01-29 | End: 2021-01-29 | Stop reason: HOSPADM

## 2021-01-29 RX ADMIN — SODIUM CHLORIDE 500 ML: 9 INJECTION, SOLUTION INTRAVENOUS at 11:06

## 2021-01-29 RX ADMIN — PROPOFOL 50 MG: 10 INJECTION, EMULSION INTRAVENOUS at 12:34

## 2021-01-29 RX ADMIN — PROPOFOL 40 MG: 10 INJECTION, EMULSION INTRAVENOUS at 12:38

## 2021-01-29 RX ADMIN — PROPOFOL 60 MG: 10 INJECTION, EMULSION INTRAVENOUS at 12:36

## 2021-01-29 RX ADMIN — LIDOCAINE HYDROCHLORIDE 100 MG: 20 INJECTION, SOLUTION EPIDURAL; INFILTRATION; INTRACAUDAL; PERINEURAL at 12:34

## 2021-01-29 NOTE — ANESTHESIA PREPROCEDURE EVALUATION
Anesthesia Evaluation     Patient summary reviewed   no history of anesthetic complications:  NPO Solid Status: > 8 hours  NPO Liquid Status: > 8 hours           Airway   Mallampati: II  TM distance: >3 FB  Neck ROM: full  No difficulty expected  Dental    (+) edentulous    Pulmonary - negative pulmonary ROS   Cardiovascular   Exercise tolerance: good (4-7 METS)    (+) hyperlipidemia,   (-) past MI, angina      Neuro/Psych- negative ROS  GI/Hepatic/Renal/Endo    (+)  GERD,  diabetes mellitus, thyroid problem hypothyroidism    Musculoskeletal     Abdominal    Substance History      OB/GYN          Other                        Anesthesia Plan    ASA 2     MAC     intravenous induction     Anesthetic plan, all risks, benefits, and alternatives have been provided, discussed and informed consent has been obtained with: patient.

## 2021-01-29 NOTE — ANESTHESIA POSTPROCEDURE EVALUATION
Patient: Caleb Wood    Procedure Summary     Date: 01/29/21 Room / Location: [facility] ENDOSCOPY 6 /  [facility]    Anesthesia Start: 1230 Anesthesia Stop: 1241    Procedure: ESOPHAGOGASTRODUODENOSCOPY WITH ANESTHESIA (N/A ) Diagnosis:       Dyspepsia      (Dyspepsia [R10.13])    Surgeon: Damien Odonnell MD Provider: Aldo Villavicencio CRNA    Anesthesia Type: MAC ASA Status: 2          Anesthesia Type: MAC    Vitals  No vitals data found for the desired time range.          Post Anesthesia Care and Evaluation    Patient location during evaluation: PHASE II  Patient participation: complete - patient participated  Level of consciousness: awake  Pain score: 0  Pain management: adequate  Airway patency: patent  Anesthetic complications: No anesthetic complications  PONV Status: none  Cardiovascular status: acceptable  Respiratory status: acceptable  Hydration status: acceptable

## 2021-01-31 LAB — UREASE TISS QL: NEGATIVE

## 2021-02-14 NOTE — PROGRESS NOTES
Subjective    Mr. Wood is 80 y.o. male    Chief Complaint: BPH    History of Present Illness  80-year-old male new patient referred by Dr. Garcias for bothersome LUTS on tamsulosin.  He had TURP in 1994 and has been on tamsulosin for several years.  He denies gross hematuria or flank pain.  He also has history of erectile dysfunction and Peyronie's disease but is not currently sexually active.  His UA today is clear.  His PSA was checked December 2020 was normal 0.68.    The following portions of the patient's history were reviewed and updated as appropriate: allergies, current medications, past family history, past medical history, past social history, past surgical history and problem list.    Review of Systems   Constitutional: Negative for appetite change and fever.   HENT: Negative for hearing loss and sore throat.    Eyes: Negative for pain and redness.   Respiratory: Negative for cough and shortness of breath.    Cardiovascular: Negative for chest pain and leg swelling.   Gastrointestinal: Negative for anal bleeding, nausea and vomiting.   Endocrine: Negative for cold intolerance and heat intolerance.   Genitourinary: Positive for frequency and urgency. Negative for dysuria, flank pain and hematuria.   Musculoskeletal: Negative for joint swelling and myalgias.   Skin: Negative for color change and rash.   Allergic/Immunologic: Negative for immunocompromised state.   Neurological: Negative for dizziness and speech difficulty.   Hematological: Negative for adenopathy. Does not bruise/bleed easily.   Psychiatric/Behavioral: Negative for dysphoric mood and suicidal ideas.         Current Outpatient Medications:   •  ALPRAZolam (XANAX) 0.5 MG tablet, Take 1 tablet by mouth At Night As Needed for Anxiety., Disp: 30 tablet, Rfl: 5  •  aspirin (ECOTRIN LOW STRENGTH) 81 MG EC tablet, Take 81 mg by mouth Daily., Disp: , Rfl:   •  coenzyme Q10 100 MG capsule, Take 200 mg by mouth Every Evening., Disp: , Rfl:   •   cyclobenzaprine (FLEXERIL) 10 MG tablet, Take 1 tablet by mouth 3 (Three) Times a Day As Needed for Muscle Spasms., Disp: 30 tablet, Rfl: 1  •  esomeprazole (nexIUM) 40 MG capsule, Take 1 capsule by mouth Daily., Disp: 30 capsule, Rfl: 11  •  glipizide (Glucotrol XL) 2.5 MG 24 hr tablet, Take 1 tablet by mouth Daily., Disp: 30 tablet, Rfl: 11  •  ibuprofen (ADVIL,MOTRIN) 200 MG tablet, Take 200 mg by mouth Every 8 (Eight) Hours As Needed for Mild Pain ., Disp: , Rfl:   •  levothyroxine (SYNTHROID) 50 MCG tablet, Take 1 tablet by mouth Daily., Disp: 90 tablet, Rfl: 3  •  metFORMIN ER (GLUCOPHAGE-XR) 750 MG 24 hr tablet, Take 1 tablet by mouth twice daily, Disp: 180 tablet, Rfl: 3  •  rosuvastatin (CRESTOR) 5 MG tablet, TAKE 1 TABLET BY MOUTH EVERY OTHER DAY, Disp: 45 tablet, Rfl: 3  •  tamsulosin (FLOMAX) 0.4 MG capsule 24 hr capsule, TAKE 1 CAPSULE BY MOUTH ONCE A DAY AT BEDTIME, Disp: 90 capsule, Rfl: 3  •  traMADol (ULTRAM) 50 MG tablet, Take 1 tablet by mouth Daily As Needed for Moderate Pain ., Disp: 30 tablet, Rfl: 0  •  Contour Test test strip, USE 1 STRIP TO CHECK GLUCOSE ONCE DAILY, Disp: 100 each, Rfl: 3  •  diclofenac (Voltaren) 1 % gel gel, Apply 4 g topically to the appropriate area as directed 4 (Four) Times a Day As Needed. 1-3 times daily, Disp: , Rfl:   •  finasteride (PROSCAR) 5 MG tablet, Take 1 tablet by mouth Daily., Disp: 90 tablet, Rfl: 3    Past Medical History:   Diagnosis Date   • Diabetes mellitus (CMS/HCC)     Type 2   • Disease of thyroid gland    • GERD (gastroesophageal reflux disease)    • Hemorrhoids    • Hx of colonic polyps    • Hypercholesteremia        Past Surgical History:   Procedure Laterality Date   • CHOLECYSTECTOMY     • COLONOSCOPY  01/17/2012    Colon polyp tissue not retrieved repeat exam in 5 years   • COLONOSCOPY  01/06/2009    adenomatous polyp x1   • COLONOSCOPY N/A 6/1/2017    Tubular adenoma Transvere colon, Diverticulosis repeat exam in 5 years   • ENDOSCOPY   "06/25/2013       • ENDOSCOPY N/A 1/29/2021    Procedure: ESOPHAGOGASTRODUODENOSCOPY WITH ANESTHESIA;  Surgeon: Damien Odonnell MD;  Location: [facility] ENDOSCOPY;  Service: Gastroenterology;  Laterality: N/A;  pre dyspepsia  post normal  Darshan Garcias MD   • KNEE SURGERY     • SHOULDER SURGERY Right        Social History     Socioeconomic History   • Marital status:      Spouse name: Not on file   • Number of children: Not on file   • Years of education: Not on file   • Highest education level: Not on file   Tobacco Use   • Smoking status: Never Smoker   • Smokeless tobacco: Never Used   Substance and Sexual Activity   • Alcohol use: No   • Drug use: No   • Sexual activity: Defer       Family History   Problem Relation Age of Onset   • No Known Problems Mother    • No Known Problems Father    • Colon cancer Neg Hx    • Colon polyps Neg Hx        Objective    Temp 97.1 °F (36.2 °C)   Ht 193 cm (76\")   Wt 95.8 kg (211 lb 3.2 oz)   BMI 25.71 kg/m²     Physical Exam  Constitutional: Well nourished, Well developed; No apparent distress.  His vital signs are reviewed  Psychiatric: Appropriate affect; Alert and oriented  Eyes: Unremarkable  Musculoskeletal: Normal gait and station  GI: Abdomen is soft, non-tender  Respiratory: No distress; Unlabored movement; No accessory musculature needed with symmetric movements  Skin: No pallor or diaphoresis  ; Penis and testicles are normal; Prostate 40-50mL without nodule        Results for orders placed or performed in visit on 02/22/21   POC Urinalysis Dipstick, Multipro    Specimen: Urine   Result Value Ref Range    Color Yellow Yellow, Straw, Dark Yellow, Betsy    Clarity, UA Clear Clear    Glucose, UA Negative Negative, 1000 mg/dL (3+) mg/dL    Bilirubin Negative Negative    Ketones, UA Negative Negative    Specific Gravity  1.025 1.005 - 1.030    Blood, UA Negative Negative    pH, Urine 6.0 5.0 - 8.0    Protein, POC Negative Negative mg/dL    Urobilinogen, UA 1 " E.U./dL  (A) Normal    Nitrite, UA Negative Negative    Leukocytes Negative Negative     International Prostate Symptom Score  The following is posted based on patient questionnaire answers:  0 - not at all    1-7 mild symptoms  1- Less than one time in five  8-19 moderate symptoms  2 -Less than half the time  20-35 severe symptoms  3 - About half the time  4 - More than half the time  5 - Almost always     For following sections:  Incomplete Emptying: - How often have you had the sensation  of not emptying your bladder completely after you finished urinating?  3  Frequency: -How often have you had to urinate again less than   two hours after you finished urinating?      3  Intermittency: -How often have you found you stopped and started again  Several times when you urinate?       4  Urgency: -How often do you find it difficult to postpone urination?             2  Weak stream: - How often have you had a weak urinary stream?             5  Straining: - How often have you had to push or strain to begin  Urination?          1  Sleeping: -How many times did you most typically get up to urinate   From the time you went to bed at night until the time you got up in the   2  Morning          Total `  20    Quality of Life  How would you feel if you had to live with your urinary condition the way   3  It is now, no better, no worse for the rest of your life?    Where: 0=delighted; 1= pleased, 2= mostly satisfied, 3= mixed, 4 = mostly  Dissatisfied, 5= Unhappy, 6 = terrible    Bladder Scan interpretation  Estimation of residual urine via abdominal ultrasound  Residual Urine: 243 ml  Indication: BPH  Position: Supine  Examination: Incremental scanning of the suprapubic area using 3 MHz transducer using copious amounts of acoustic gel.   Findings: An anechoic area was demonstrated which represented the bladder, with measurement of residual urine as noted. I inspected this myself. In that the residual urine was stable or  insignificant, no treatment will be necessary at this time.       Assessment and Plan    Diagnoses and all orders for this visit:    1. BPH with obstruction/lower urinary tract symptoms (Primary)  -     POC Urinalysis Dipstick, Multipro  -     Discontinue: finasteride (PROSCAR) 5 MG tablet; Take 1 tablet by mouth Daily.  Dispense: 90 tablet; Refill: 3  -     finasteride (PROSCAR) 5 MG tablet; Take 1 tablet by mouth Daily.  Dispense: 90 tablet; Refill: 3    2. Erectile dysfunction due to diseases classified elsewhere    3. Controlled type 2 diabetes mellitus without complication, without long-term current use of insulin (CMS/[facility])    4. Benign essential HTN      Enlarged prostate with mildly elevated PVR on alpha-blocker with history of prior TURP.  He would like to avoid additional surgery at this time.  I recommended adding finasteride to his tamsulosin.  Given his advanced age and normal PSA, I recommended no further PSA testing per AUA guidelines.  He does not want his ED treated at this time.  He will follow-up in 1 year with our PA or sooner as needed.      This document has been signed by ЕЛЕНА Post MD on February 22, 2021 16:35 CST

## 2021-02-16 ENCOUNTER — NURSE TRIAGE (OUTPATIENT)
Dept: CALL CENTER | Facility: HOSPITAL | Age: 81
End: 2021-02-16

## 2021-02-16 RX ORDER — CARVEDILOL 25 MG/1
TABLET, FILM COATED ORAL
Qty: 100 EACH | Refills: 3 | Status: SHIPPED | OUTPATIENT
Start: 2021-02-16 | End: 2021-02-24

## 2021-02-16 NOTE — TELEPHONE ENCOUNTER
"  Reason for Disposition  • Health Information question, no triage required and triager able to answer question    Additional Information  • Negative: [1] Caller is not with the adult (patient) AND [2] reporting urgent symptoms  • Negative: Lab result questions  • Negative: Medication questions  • Negative: Caller can't be reached by phone  • Negative: Caller has already spoken to PCP or another triager  • Negative: RN needs further essential information from caller in order to complete triage  • Negative: Requesting regular office appointment  • Negative: [1] Caller requesting NON-URGENT health information AND [2] PCP's office is the best resource    Answer Assessment - Initial Assessment Questions  1. REASON FOR CALL or QUESTION: \"What is your reason for calling today?\" or \"How can I best help you?\" or \"What question do you have that I can help answer?\"  Refill for Contour Glucose test strips. [facility] pharmacy in [place]. Advised caller to have pharmacy send electronic request to Dr Garcias's office. Verbalizes understanding    Protocols used: INFORMATION ONLY CALL-ADULT-    "

## 2021-02-22 ENCOUNTER — OFFICE VISIT (OUTPATIENT)
Dept: UROLOGY | Facility: CLINIC | Age: 81
End: 2021-02-22

## 2021-02-22 VITALS — WEIGHT: 211.2 LBS | HEIGHT: 76 IN | TEMPERATURE: 97.1 F | BODY MASS INDEX: 25.72 KG/M2

## 2021-02-22 DIAGNOSIS — I10 BENIGN ESSENTIAL HTN: ICD-10-CM

## 2021-02-22 DIAGNOSIS — N40.1 BPH WITH OBSTRUCTION/LOWER URINARY TRACT SYMPTOMS: Primary | ICD-10-CM

## 2021-02-22 DIAGNOSIS — N13.8 BPH WITH OBSTRUCTION/LOWER URINARY TRACT SYMPTOMS: Primary | ICD-10-CM

## 2021-02-22 DIAGNOSIS — N52.1 ERECTILE DYSFUNCTION DUE TO DISEASES CLASSIFIED ELSEWHERE: ICD-10-CM

## 2021-02-22 DIAGNOSIS — E11.9 CONTROLLED TYPE 2 DIABETES MELLITUS WITHOUT COMPLICATION, WITHOUT LONG-TERM CURRENT USE OF INSULIN (HCC): ICD-10-CM

## 2021-02-22 LAB
BILIRUB BLD-MCNC: NEGATIVE MG/DL
CLARITY, POC: CLEAR
COLOR UR: YELLOW
GLUCOSE UR STRIP-MCNC: NEGATIVE MG/DL
KETONES UR QL: NEGATIVE
LEUKOCYTE EST, POC: NEGATIVE
NITRITE UR-MCNC: NEGATIVE MG/ML
PH UR: 6 [PH] (ref 5–8)
PROT UR STRIP-MCNC: NEGATIVE MG/DL
RBC # UR STRIP: NEGATIVE /UL
SP GR UR: 1.02 (ref 1–1.03)
UROBILINOGEN UR QL: ABNORMAL

## 2021-02-22 PROCEDURE — 81003 URINALYSIS AUTO W/O SCOPE: CPT | Performed by: UROLOGY

## 2021-02-22 PROCEDURE — 99204 OFFICE O/P NEW MOD 45 MIN: CPT | Performed by: UROLOGY

## 2021-02-22 PROCEDURE — 51798 US URINE CAPACITY MEASURE: CPT | Performed by: UROLOGY

## 2021-02-22 RX ORDER — FINASTERIDE 5 MG/1
5 TABLET, FILM COATED ORAL DAILY
Qty: 90 TABLET | Refills: 3 | Status: SHIPPED | OUTPATIENT
Start: 2021-02-22 | End: 2021-02-22

## 2021-02-22 RX ORDER — FINASTERIDE 5 MG/1
5 TABLET, FILM COATED ORAL DAILY
Qty: 90 TABLET | Refills: 3 | Status: SHIPPED | OUTPATIENT
Start: 2021-02-22 | End: 2022-08-24 | Stop reason: SDUPTHER

## 2021-02-24 DIAGNOSIS — E11.9 CONTROLLED TYPE 2 DIABETES MELLITUS WITHOUT COMPLICATION, WITHOUT LONG-TERM CURRENT USE OF INSULIN (HCC): Primary | ICD-10-CM

## 2021-02-24 RX ORDER — CARVEDILOL 25 MG/1
TABLET, FILM COATED ORAL
Qty: 100 EACH | Refills: 3 | Status: SHIPPED | OUTPATIENT
Start: 2021-02-24 | End: 2021-04-05 | Stop reason: SDUPTHER

## 2021-03-08 RX ORDER — LEVOTHYROXINE SODIUM 0.05 MG/1
TABLET ORAL
Qty: 90 TABLET | Refills: 3 | Status: SHIPPED | OUTPATIENT
Start: 2021-03-08 | End: 2022-03-01

## 2021-03-21 DIAGNOSIS — K21.9 GASTROESOPHAGEAL REFLUX DISEASE: ICD-10-CM

## 2021-03-22 RX ORDER — ESOMEPRAZOLE MAGNESIUM 40 MG/1
CAPSULE, DELAYED RELEASE ORAL
Qty: 30 CAPSULE | Refills: 5 | Status: SHIPPED | OUTPATIENT
Start: 2021-03-22 | End: 2021-09-20

## 2021-04-05 ENCOUNTER — OFFICE VISIT (OUTPATIENT)
Dept: INTERNAL MEDICINE | Facility: CLINIC | Age: 81
End: 2021-04-05

## 2021-04-05 VITALS
HEART RATE: 68 BPM | BODY MASS INDEX: 25.21 KG/M2 | DIASTOLIC BLOOD PRESSURE: 80 MMHG | SYSTOLIC BLOOD PRESSURE: 140 MMHG | OXYGEN SATURATION: 99 % | HEIGHT: 76 IN | WEIGHT: 207 LBS | TEMPERATURE: 98.1 F

## 2021-04-05 DIAGNOSIS — F43.23 SITUATIONAL MIXED ANXIETY AND DEPRESSIVE DISORDER: ICD-10-CM

## 2021-04-05 DIAGNOSIS — I10 BENIGN ESSENTIAL HTN: ICD-10-CM

## 2021-04-05 DIAGNOSIS — E11.9 ENCOUNTER FOR DIABETIC FOOT EXAM (HCC): Primary | ICD-10-CM

## 2021-04-05 DIAGNOSIS — E11.9 CONTROLLED TYPE 2 DIABETES MELLITUS WITHOUT COMPLICATION, WITHOUT LONG-TERM CURRENT USE OF INSULIN (HCC): ICD-10-CM

## 2021-04-05 LAB — HBA1C MFR BLD: 6.3 %

## 2021-04-05 PROCEDURE — 83036 HEMOGLOBIN GLYCOSYLATED A1C: CPT | Performed by: INTERNAL MEDICINE

## 2021-04-05 PROCEDURE — 99214 OFFICE O/P EST MOD 30 MIN: CPT | Performed by: INTERNAL MEDICINE

## 2021-04-05 RX ORDER — ALPRAZOLAM 0.5 MG/1
0.5 TABLET ORAL NIGHTLY PRN
Qty: 30 TABLET | Refills: 5 | Status: SHIPPED | OUTPATIENT
Start: 2021-04-05 | End: 2021-10-12 | Stop reason: SDUPTHER

## 2021-04-05 RX ORDER — CARVEDILOL 25 MG/1
1 TABLET, FILM COATED ORAL DAILY
Qty: 100 EACH | Refills: 3 | Status: SHIPPED | OUTPATIENT
Start: 2021-04-05 | End: 2022-02-09 | Stop reason: SDUPTHER

## 2021-04-05 NOTE — PROGRESS NOTES
Subjective   Caleb Wood is a 80 y.o. male.   Chief Complaint   Patient presents with   • Hypertension     3 month follow up    • Diabetes     a1c: 6.3       History of Present Illness this is a 3-month follow-up for hypertension diabetes.  Patient has an A1c of 6.3 his blood pressure is 140/80.  He is taking his medications as prescribed.    The following portions of the patient's history were reviewed and updated as appropriate: allergies, current medications, past family history, past medical history, past social history, past surgical history and problem list.    Review of Systems   Constitutional: Negative for activity change, appetite change, fatigue, fever, unexpected weight gain and unexpected weight loss.   HENT: Negative for swollen glands, trouble swallowing and voice change.    Eyes: Negative for blurred vision and visual disturbance.   Respiratory: Negative for cough and shortness of breath.    Cardiovascular: Negative for chest pain, palpitations and leg swelling.   Gastrointestinal: Negative for abdominal pain, constipation, diarrhea, nausea, vomiting and indigestion.   Endocrine: Negative for cold intolerance, heat intolerance, polydipsia and polyphagia.   Genitourinary: Negative for dysuria and frequency.   Musculoskeletal: Negative for arthralgias, back pain, joint swelling and neck pain.   Skin: Negative for color change, rash and skin lesions.   Neurological: Negative for dizziness, weakness, headache, memory problem and confusion.   Hematological: Does not bruise/bleed easily.   Psychiatric/Behavioral: Negative for agitation, hallucinations and suicidal ideas. The patient is not nervous/anxious.        Objective   Past Medical History:   Diagnosis Date   • Diabetes mellitus (CMS/HCC)     Type 2   • Disease of thyroid gland    • GERD (gastroesophageal reflux disease)    • Hemorrhoids    • Hx of colonic polyps    • Hypercholesteremia       Past Surgical History:   Procedure Laterality Date   •  CHOLECYSTECTOMY     • COLONOSCOPY  01/17/2012    Colon polyp tissue not retrieved repeat exam in 5 years   • COLONOSCOPY  01/06/2009    adenomatous polyp x1   • COLONOSCOPY N/A 6/1/2017    Tubular adenoma Transvere colon, Diverticulosis repeat exam in 5 years   • ENDOSCOPY  06/25/2013    [facility]   • ENDOSCOPY N/A 1/29/2021    Procedure: ESOPHAGOGASTRODUODENOSCOPY WITH ANESTHESIA;  Surgeon: Damien Odonnell MD;  Location: [facility] ENDOSCOPY;  Service: Gastroenterology;  Laterality: N/A;  pre dyspepsia  post normal  Darshan Garcias MD   • KNEE SURGERY     • SHOULDER SURGERY Right         Current Outpatient Medications:   •  ALPRAZolam (XANAX) 0.5 MG tablet, Take 1 tablet by mouth At Night As Needed for Anxiety., Disp: 30 tablet, Rfl: 5  •  aspirin (ECOTRIN LOW STRENGTH) 81 MG EC tablet, Take 81 mg by mouth Daily., Disp: , Rfl:   •  coenzyme Q10 100 MG capsule, Take 200 mg by mouth Every Evening., Disp: , Rfl:   •  Contour Test test strip, USE 1 STRIP TO CHECK GLUCOSE ONCE DAILY, Disp: 100 each, Rfl: 3  •  cyclobenzaprine (FLEXERIL) 10 MG tablet, Take 1 tablet by mouth 3 (Three) Times a Day As Needed for Muscle Spasms., Disp: 30 tablet, Rfl: 1  •  diclofenac (Voltaren) 1 % gel gel, Apply 4 g topically to the appropriate area as directed 4 (Four) Times a Day As Needed. 1-3 times daily, Disp: , Rfl:   •  esomeprazole (nexIUM) 40 MG capsule, Take 1 capsule by mouth once daily, Disp: 30 capsule, Rfl: 5  •  finasteride (PROSCAR) 5 MG tablet, Take 1 tablet by mouth Daily., Disp: 90 tablet, Rfl: 3  •  glipizide (Glucotrol XL) 2.5 MG 24 hr tablet, Take 1 tablet by mouth Daily., Disp: 30 tablet, Rfl: 11  •  ibuprofen (ADVIL,MOTRIN) 200 MG tablet, Take 200 mg by mouth Every 8 (Eight) Hours As Needed for Mild Pain ., Disp: , Rfl:   •  levothyroxine (SYNTHROID, LEVOTHROID) 50 MCG tablet, Take 1 tablet by mouth once daily, Disp: 90 tablet, Rfl: 3  •  metFORMIN ER (GLUCOPHAGE-XR) 750 MG 24 hr tablet, Take 1 tablet by mouth twice daily,  Disp: 180 tablet, Rfl: 3  •  rosuvastatin (CRESTOR) 5 MG tablet, TAKE 1 TABLET BY MOUTH EVERY OTHER DAY, Disp: 45 tablet, Rfl: 3  •  tamsulosin (FLOMAX) 0.4 MG capsule 24 hr capsule, TAKE 1 CAPSULE BY MOUTH ONCE A DAY AT BEDTIME, Disp: 90 capsule, Rfl: 3  •  traMADol (ULTRAM) 50 MG tablet, Take 1 tablet by mouth Daily As Needed for Moderate Pain ., Disp: 30 tablet, Rfl: 0     Vitals:    04/05/21 1132   BP: 140/80   Pulse: 68   Temp: 98.1 °F (36.7 °C)   SpO2: 99%         04/05/21  1132   Weight: 93.9 kg (207 lb)     Patient's Body mass index is 25.2 kg/m². BMI is .      Physical Exam  Vitals and nursing note reviewed.   Constitutional:       General: He is not in acute distress.     Appearance: Normal appearance. He is well-developed.   HENT:      Head: Normocephalic and atraumatic.      Right Ear: External ear normal.      Left Ear: External ear normal.      Nose: Nose normal.   Eyes:      Extraocular Movements: Extraocular movements intact.      Conjunctiva/sclera: Conjunctivae normal.      Pupils: Pupils are equal, round, and reactive to light.   Cardiovascular:      Rate and Rhythm: Normal rate and regular rhythm.      Pulses: Normal pulses.      Heart sounds: Normal heart sounds.   Pulmonary:      Effort: Pulmonary effort is normal.      Breath sounds: Normal breath sounds.   Abdominal:      General: Bowel sounds are normal.      Palpations: Abdomen is soft.   Musculoskeletal:         General: Normal range of motion.      Cervical back: Normal range of motion and neck supple. No rigidity. No muscular tenderness.   Skin:     General: Skin is warm and dry.   Neurological:      General: No focal deficit present.      Mental Status: He is alert and oriented to person, place, and time.   Psychiatric:         Mood and Affect: Mood normal.         Behavior: Behavior normal.               Assessment/Plan   Diagnoses and all orders for this visit:    1. Encounter for diabetic foot exam (CMS/HCC) (Primary)  -     POC  Glycosylated Hemoglobin (Hb A1C)    2. Controlled type 2 diabetes mellitus without complication, without long-term current use of insulin (CMS/[facility])  -     glucose blood (Contour Test) test strip; 1 each by Other route Daily. Use as instructed  Dispense: 100 each; Refill: 3    3. Situational mixed anxiety and depressive disorder  -     ALPRAZolam (XANAX) 0.5 MG tablet; Take 1 tablet by mouth At Night As Needed for Anxiety.  Dispense: 30 tablet; Refill: 5    4. Benign essential HTN      Patient is to continue current medications as prescribed.  We did spend some time talking about use of proscar in the treatment of BPH.  He has some concerns that it may cause prostate cancer.  I am unaware of any studies that showed that certainly have a large number of people who take that medication.

## 2021-04-12 RX ORDER — TAMSULOSIN HYDROCHLORIDE 0.4 MG/1
CAPSULE ORAL
Qty: 90 CAPSULE | Refills: 3 | Status: SHIPPED | OUTPATIENT
Start: 2021-04-12 | End: 2022-04-14 | Stop reason: SDUPTHER

## 2021-07-21 RX ORDER — METFORMIN HYDROCHLORIDE 750 MG/1
TABLET, EXTENDED RELEASE ORAL
Qty: 180 TABLET | Refills: 3 | Status: SHIPPED | OUTPATIENT
Start: 2021-07-21 | End: 2022-06-16 | Stop reason: SDUPTHER

## 2021-09-03 ENCOUNTER — TELEPHONE (OUTPATIENT)
Dept: INTERNAL MEDICINE | Facility: CLINIC | Age: 81
End: 2021-09-03

## 2021-09-03 NOTE — TELEPHONE ENCOUNTER
Caller: Caleb Wood    Relationship to patient: Self    Best call back number: [phone]    I RESCHEDULED PATIENT'S APPOINTMENT FROM 10/5 TO 10/12

## 2021-09-20 DIAGNOSIS — K21.9 GASTROESOPHAGEAL REFLUX DISEASE: ICD-10-CM

## 2021-09-20 RX ORDER — ESOMEPRAZOLE MAGNESIUM 40 MG/1
CAPSULE, DELAYED RELEASE ORAL
Qty: 90 CAPSULE | Refills: 1 | Status: SHIPPED | OUTPATIENT
Start: 2021-09-20 | End: 2022-03-22

## 2021-10-12 ENCOUNTER — OFFICE VISIT (OUTPATIENT)
Dept: INTERNAL MEDICINE | Facility: CLINIC | Age: 81
End: 2021-10-12

## 2021-10-12 VITALS
SYSTOLIC BLOOD PRESSURE: 152 MMHG | HEART RATE: 62 BPM | TEMPERATURE: 97.1 F | DIASTOLIC BLOOD PRESSURE: 70 MMHG | HEIGHT: 76 IN | WEIGHT: 210 LBS | BODY MASS INDEX: 25.57 KG/M2 | OXYGEN SATURATION: 100 %

## 2021-10-12 DIAGNOSIS — E78.00 PURE HYPERCHOLESTEROLEMIA: ICD-10-CM

## 2021-10-12 DIAGNOSIS — E11.9 CONTROLLED TYPE 2 DIABETES MELLITUS WITHOUT COMPLICATION, WITHOUT LONG-TERM CURRENT USE OF INSULIN (HCC): ICD-10-CM

## 2021-10-12 DIAGNOSIS — M17.12 PRIMARY OSTEOARTHRITIS OF LEFT KNEE: ICD-10-CM

## 2021-10-12 DIAGNOSIS — R10.9 LEFT FLANK PAIN: Primary | ICD-10-CM

## 2021-10-12 DIAGNOSIS — F43.23 SITUATIONAL MIXED ANXIETY AND DEPRESSIVE DISORDER: ICD-10-CM

## 2021-10-12 LAB
EXPIRATION DATE: NORMAL
HBA1C MFR BLD: 6.3 %
Lab: NORMAL

## 2021-10-12 PROCEDURE — 99214 OFFICE O/P EST MOD 30 MIN: CPT | Performed by: INTERNAL MEDICINE

## 2021-10-12 PROCEDURE — 83036 HEMOGLOBIN GLYCOSYLATED A1C: CPT | Performed by: INTERNAL MEDICINE

## 2021-10-12 RX ORDER — ROSUVASTATIN CALCIUM 5 MG/1
5 TABLET, COATED ORAL EVERY OTHER DAY
Qty: 45 TABLET | Refills: 3 | Status: SHIPPED | OUTPATIENT
Start: 2021-10-12 | End: 2022-12-27 | Stop reason: SDUPTHER

## 2021-10-12 RX ORDER — ALPRAZOLAM 0.5 MG/1
0.5 TABLET ORAL NIGHTLY PRN
Qty: 30 TABLET | Refills: 5 | Status: SHIPPED | OUTPATIENT
Start: 2021-10-12 | End: 2022-04-18 | Stop reason: SDUPTHER

## 2021-10-12 NOTE — PROGRESS NOTES
Subjective   Caleb Wood is a 81 y.o. male.   Chief Complaint   Patient presents with   • Hypertension   • Diabetes     a1c: 6.3   • Knee Pain     left inner knee pain, onging issue for a few months especially when he sleeps and his right knee rest on it.    • flank pain     left flank pain; dull ache        History of Present Illness patient is here with complaints of left inner knee pain as well as left flank pain.  He also is being followed up for hypertension diabetes    The following portions of the patient's history were reviewed and updated as appropriate: allergies, current medications, past family history, past medical history, past social history, past surgical history and problem list.    Review of Systems   Constitutional: Negative for activity change, appetite change, fatigue, fever, unexpected weight gain and unexpected weight loss.   HENT: Negative for swollen glands, trouble swallowing and voice change.    Eyes: Negative for blurred vision and visual disturbance.   Respiratory: Negative for cough and shortness of breath.    Cardiovascular: Negative for chest pain, palpitations and leg swelling.   Gastrointestinal: Negative for abdominal pain, constipation, diarrhea, nausea, vomiting and indigestion.   Endocrine: Negative for cold intolerance, heat intolerance, polydipsia and polyphagia.   Genitourinary: Negative for dysuria and frequency.   Musculoskeletal: Negative for arthralgias, back pain, joint swelling and neck pain.        Left flank pain left medial knee pain   Skin: Negative for color change, rash and skin lesions.   Neurological: Negative for dizziness, weakness, headache, memory problem and confusion.   Hematological: Does not bruise/bleed easily.   Psychiatric/Behavioral: Negative for agitation, hallucinations and suicidal ideas. The patient is not nervous/anxious.        Objective   Past Medical History:   Diagnosis Date   • Diabetes mellitus (HCC)     Type 2   • Disease of thyroid  gland    • GERD (gastroesophageal reflux disease)    • Hemorrhoids    • Hx of colonic polyps    • Hypercholesteremia       Past Surgical History:   Procedure Laterality Date   • CHOLECYSTECTOMY     • COLONOSCOPY  01/17/2012    Colon polyp tissue not retrieved repeat exam in 5 years   • COLONOSCOPY  01/06/2009    adenomatous polyp x1   • COLONOSCOPY N/A 6/1/2017    Tubular adenoma Transvere colon, Diverticulosis repeat exam in 5 years   • ENDOSCOPY  06/25/2013    [facility]   • ENDOSCOPY N/A 1/29/2021    Procedure: ESOPHAGOGASTRODUODENOSCOPY WITH ANESTHESIA;  Surgeon: Damien Odonnell MD;  Location: [facility] ENDOSCOPY;  Service: Gastroenterology;  Laterality: N/A;  pre dyspepsia  post normal  Darshan Garcias MD   • KNEE SURGERY     • SHOULDER SURGERY Right         Current Outpatient Medications:   •  ALPRAZolam (XANAX) 0.5 MG tablet, Take 1 tablet by mouth At Night As Needed for Anxiety., Disp: 30 tablet, Rfl: 5  •  aspirin (ECOTRIN LOW STRENGTH) 81 MG EC tablet, Take 81 mg by mouth Daily., Disp: , Rfl:   •  coenzyme Q10 100 MG capsule, Take 200 mg by mouth Every Evening., Disp: , Rfl:   •  cyclobenzaprine (FLEXERIL) 10 MG tablet, Take 1 tablet by mouth 3 (Three) Times a Day As Needed for Muscle Spasms., Disp: 30 tablet, Rfl: 1  •  diclofenac (Voltaren) 1 % gel gel, Apply 4 g topically to the appropriate area as directed 4 (Four) Times a Day As Needed. 1-3 times daily, Disp: , Rfl:   •  esomeprazole (nexIUM) 40 MG capsule, Take 1 capsule by mouth once daily, Disp: 90 capsule, Rfl: 1  •  finasteride (PROSCAR) 5 MG tablet, Take 1 tablet by mouth Daily., Disp: 90 tablet, Rfl: 3  •  glipizide (Glucotrol XL) 2.5 MG 24 hr tablet, Take 1 tablet by mouth Daily., Disp: 30 tablet, Rfl: 11  •  glucose blood (Contour Test) test strip, 1 each by Other route Daily. Use as instructed, Disp: 100 each, Rfl: 3  •  ibuprofen (ADVIL,MOTRIN) 200 MG tablet, Take 200 mg by mouth Every 8 (Eight) Hours As Needed for Mild Pain ., Disp: , Rfl:   •   levothyroxine (SYNTHROID, LEVOTHROID) 50 MCG tablet, Take 1 tablet by mouth once daily, Disp: 90 tablet, Rfl: 3  •  metFORMIN ER (GLUCOPHAGE-XR) 750 MG 24 hr tablet, Take 1 tablet by mouth twice daily, Disp: 180 tablet, Rfl: 3  •  rosuvastatin (CRESTOR) 5 MG tablet, Take 1 tablet by mouth Every Other Day., Disp: 45 tablet, Rfl: 3  •  tamsulosin (FLOMAX) 0.4 MG capsule 24 hr capsule, TAKE 1 CAPSULE BY MOUTH DAILY AT BEDTIME., Disp: 90 capsule, Rfl: 3  •  traMADol (ULTRAM) 50 MG tablet, Take 1 tablet by mouth Daily As Needed for Moderate Pain ., Disp: 30 tablet, Rfl: 0      Vitals:    10/12/21 1513   BP: 152/70   Pulse: 62   Temp: 97.1 °F (36.2 °C)   SpO2: 100%         10/12/21  1513   Weight: 95.3 kg (210 lb)       Body mass index is 25.56 kg/m².    Physical Exam  Vitals and nursing note reviewed.   Constitutional:       General: He is not in acute distress.     Appearance: Normal appearance. He is well-developed.   HENT:      Head: Normocephalic and atraumatic.      Right Ear: External ear normal.      Left Ear: External ear normal.      Nose: Nose normal.   Eyes:      Extraocular Movements: Extraocular movements intact.      Conjunctiva/sclera: Conjunctivae normal.      Pupils: Pupils are equal, round, and reactive to light.   Cardiovascular:      Rate and Rhythm: Normal rate and regular rhythm.      Pulses: Normal pulses.      Heart sounds: Normal heart sounds.   Pulmonary:      Effort: Pulmonary effort is normal.      Breath sounds: Normal breath sounds.   Abdominal:      General: Bowel sounds are normal.      Palpations: Abdomen is soft.   Musculoskeletal:         General: Normal range of motion.      Cervical back: Normal range of motion and neck supple. No rigidity. No muscular tenderness.   Skin:     General: Skin is warm and dry.   Neurological:      General: No focal deficit present.      Mental Status: He is alert and oriented to person, place, and time.   Psychiatric:         Mood and Affect: Mood normal.          Behavior: Behavior normal.               Assessment/Plan   Diagnoses and all orders for this visit:    1. Left flank pain (Primary)  -     US Renal Bilateral; Future    2. Situational mixed anxiety and depressive disorder  -     ALPRAZolam (XANAX) 0.5 MG tablet; Take 1 tablet by mouth At Night As Needed for Anxiety.  Dispense: 30 tablet; Refill: 5    3. Pure hypercholesterolemia  -     Basic Metabolic Panel  -     ALT  -     AST  -     Lipid Panel    4. Controlled type 2 diabetes mellitus without complication, without long-term current use of insulin (HCC)  -     POC Glycosylated Hemoglobin (Hb A1C)    5. Primary osteoarthritis of left knee    Other orders  -     rosuvastatin (CRESTOR) 5 MG tablet; Take 1 tablet by mouth Every Other Day.  Dispense: 45 tablet; Refill: 3          On exam patient had no tenderness when I palpated his left flank lower and his left knee.  I do want him to have an ultrasound of his kidney because he is experiencing some discomfort over the left flank.  In regard to the knee I told him as long as he can use diclofenac and get control of his pain that I would continue with that at which point that no longer works he needs to be seen by orthopedic surgery.

## 2021-10-13 ENCOUNTER — TELEPHONE (OUTPATIENT)
Dept: INTERNAL MEDICINE | Facility: CLINIC | Age: 81
End: 2021-10-13

## 2021-10-13 LAB
ALT SERPL-CCNC: 18 U/L (ref 1–41)
AST SERPL-CCNC: 17 U/L (ref 1–40)
BUN SERPL-MCNC: 16 MG/DL (ref 8–23)
BUN/CREAT SERPL: 14.2 (ref 7–25)
CALCIUM SERPL-MCNC: 9.4 MG/DL (ref 8.6–10.5)
CHLORIDE SERPL-SCNC: 103 MMOL/L (ref 98–107)
CHOLEST SERPL-MCNC: 144 MG/DL (ref 0–200)
CO2 SERPL-SCNC: 28.2 MMOL/L (ref 22–29)
CREAT SERPL-MCNC: 1.13 MG/DL (ref 0.76–1.27)
GLUCOSE SERPL-MCNC: 103 MG/DL (ref 65–99)
HDLC SERPL-MCNC: 38 MG/DL (ref 40–60)
LDLC SERPL CALC-MCNC: 79 MG/DL (ref 0–100)
POTASSIUM SERPL-SCNC: 4.4 MMOL/L (ref 3.5–5.2)
SODIUM SERPL-SCNC: 142 MMOL/L (ref 136–145)
TRIGL SERPL-MCNC: 155 MG/DL (ref 0–150)
VLDLC SERPL CALC-MCNC: 27 MG/DL (ref 5–40)

## 2021-10-13 NOTE — TELEPHONE ENCOUNTER
----- Message from Darshan Garcias MD sent at 10/13/2021  7:58 AM CDT -----  Very mild elevation of sugar and triglyceride make sure he is reducing or eliminating all simple sugars and bad carbs.

## 2021-10-14 ENCOUNTER — HOSPITAL ENCOUNTER (OUTPATIENT)
Dept: ULTRASOUND IMAGING | Facility: HOSPITAL | Age: 81
Discharge: HOME | End: 2021-10-14
Admitting: INTERNAL MEDICINE

## 2021-10-14 DIAGNOSIS — R10.9 LEFT FLANK PAIN: ICD-10-CM

## 2021-10-14 PROCEDURE — 76775 US EXAM ABDO BACK WALL LIM: CPT

## 2021-10-15 ENCOUNTER — TELEPHONE (OUTPATIENT)
Dept: INTERNAL MEDICINE | Facility: CLINIC | Age: 81
End: 2021-10-15

## 2021-10-15 NOTE — TELEPHONE ENCOUNTER
----- Message from Darshan Garcias MD sent at 10/14/2021  9:46 AM CDT -----  Results are normal except for benign cysts

## 2021-11-02 ENCOUNTER — TELEPHONE (OUTPATIENT)
Dept: INTERNAL MEDICINE | Facility: CLINIC | Age: 81
End: 2021-11-02

## 2021-11-02 NOTE — TELEPHONE ENCOUNTER
Caller: Caleb Wood    Relationship: Self    Best call back number: [phone]  What medication are you requesting: STERIOD/ PAIN MED   What are your current symptoms: BACK PAIN         Have you had these symptoms before:    [x] Yes  [] No    Have you been treated for these symptoms before:   [x] Yes  [] No    If a prescription is needed, what is your preferred pharmacy and phone number:  [address] [phone] [facility] [phone]   [phone]

## 2021-11-02 NOTE — TELEPHONE ENCOUNTER
He would have to come back in to be re-assessed.  You can add him today at 3:30 or Thursday at 2:15.

## 2021-11-02 NOTE — TELEPHONE ENCOUNTER
Pt states he couldn't come in today as he hurts to bad. Thursday that was offered he has Chiropractor appt he is trying to get someone to take him to that day. He was just hoping like previously, was to get a zpak or something called in. If not that is fine an he will work through the pain.

## 2021-11-02 NOTE — TELEPHONE ENCOUNTER
We haven't given him Medrol dose pack since 1 year ago and was in recently for flank pain, so would need to see him again to re-assess.

## 2021-11-03 RX ORDER — CYCLOBENZAPRINE HCL 10 MG
TABLET ORAL
Qty: 30 TABLET | Refills: 0 | OUTPATIENT
Start: 2021-11-03

## 2021-11-04 ENCOUNTER — OFFICE VISIT (OUTPATIENT)
Dept: INTERNAL MEDICINE | Facility: CLINIC | Age: 81
End: 2021-11-04

## 2021-11-04 VITALS
OXYGEN SATURATION: 100 % | WEIGHT: 206 LBS | HEIGHT: 76 IN | TEMPERATURE: 97.1 F | DIASTOLIC BLOOD PRESSURE: 80 MMHG | HEART RATE: 80 BPM | BODY MASS INDEX: 25.09 KG/M2 | SYSTOLIC BLOOD PRESSURE: 120 MMHG

## 2021-11-04 DIAGNOSIS — M54.50 CHRONIC LOW BACK PAIN, UNSPECIFIED BACK PAIN LATERALITY, UNSPECIFIED WHETHER SCIATICA PRESENT: ICD-10-CM

## 2021-11-04 DIAGNOSIS — G89.29 CHRONIC LOW BACK PAIN, UNSPECIFIED BACK PAIN LATERALITY, UNSPECIFIED WHETHER SCIATICA PRESENT: ICD-10-CM

## 2021-11-04 DIAGNOSIS — B02.9 HERPES ZOSTER WITHOUT COMPLICATION: Primary | ICD-10-CM

## 2021-11-04 PROCEDURE — 99214 OFFICE O/P EST MOD 30 MIN: CPT | Performed by: INTERNAL MEDICINE

## 2021-11-04 RX ORDER — TRAMADOL HYDROCHLORIDE 50 MG/1
50 TABLET ORAL DAILY PRN
Qty: 30 TABLET | Refills: 0 | Status: SHIPPED | OUTPATIENT
Start: 2021-11-04 | End: 2023-03-09 | Stop reason: SDUPTHER

## 2021-11-04 RX ORDER — FAMCICLOVIR 500 MG/1
500 TABLET ORAL 3 TIMES DAILY
Qty: 21 TABLET | Refills: 0 | Status: SHIPPED | OUTPATIENT
Start: 2021-11-04 | End: 2021-11-11

## 2021-11-04 RX ORDER — CYCLOBENZAPRINE HCL 10 MG
10 TABLET ORAL 3 TIMES DAILY PRN
Qty: 30 TABLET | Refills: 1 | Status: SHIPPED | OUTPATIENT
Start: 2021-11-04 | End: 2023-03-09 | Stop reason: SDUPTHER

## 2021-11-04 RX ORDER — METHYLPREDNISOLONE 4 MG/1
TABLET ORAL
Qty: 1 TABLET | Refills: 0 | Status: SHIPPED | OUTPATIENT
Start: 2021-11-04 | End: 2021-12-01

## 2021-11-04 NOTE — PROGRESS NOTES
Subjective   Caleb Wood is a 81 y.o. male.   Chief Complaint   Patient presents with   • Rash     lower back; pt wonders if it might be shingles   • Cough     pt had cough last week but is doing much better   • Med Refill     pt woudl like a refill on tramadol and cyclobenzaprine ( scripts pended if ok )        History of Present Illness patient thinks he has shingles. He apparently went to his chiropractor with back pain his chiropractor noticed a rash on his right lower back told him he thought he had shingles and sent him here to the office. Pain is been going on just for a couple of days he has some other vague symptoms including a cough.    The following portions of the patient's history were reviewed and updated as appropriate: allergies, current medications, past family history, past medical history, past social history, past surgical history and problem list.    Review of Systems   Constitutional: Negative for activity change, appetite change, fatigue, fever, unexpected weight gain and unexpected weight loss.   HENT: Negative for swollen glands, trouble swallowing and voice change.    Eyes: Negative for blurred vision and visual disturbance.   Respiratory: Negative for cough and shortness of breath.    Cardiovascular: Negative for chest pain, palpitations and leg swelling.   Gastrointestinal: Negative for abdominal pain, constipation, diarrhea, nausea, vomiting and indigestion.   Endocrine: Negative for cold intolerance, heat intolerance, polydipsia and polyphagia.   Genitourinary: Negative for dysuria and frequency.   Musculoskeletal: Positive for back pain. Negative for arthralgias, joint swelling and neck pain.   Skin: Negative for color change, rash and skin lesions.   Neurological: Negative for dizziness, weakness, headache, memory problem and confusion.   Hematological: Does not bruise/bleed easily.   Psychiatric/Behavioral: Negative for agitation, hallucinations and suicidal ideas. The patient is  not nervous/anxious.        Objective   Past Medical History:   Diagnosis Date   • Diabetes mellitus (HCC)     Type 2   • Disease of thyroid gland    • GERD (gastroesophageal reflux disease)    • Hemorrhoids    • Hx of colonic polyps    • Hypercholesteremia       Past Surgical History:   Procedure Laterality Date   • CHOLECYSTECTOMY     • COLONOSCOPY  01/17/2012    Colon polyp tissue not retrieved repeat exam in 5 years   • COLONOSCOPY  01/06/2009    adenomatous polyp x1   • COLONOSCOPY N/A 6/1/2017    Tubular adenoma Transvere colon, Diverticulosis repeat exam in 5 years   • ENDOSCOPY  06/25/2013    [facility]   • ENDOSCOPY N/A 1/29/2021    Procedure: ESOPHAGOGASTRODUODENOSCOPY WITH ANESTHESIA;  Surgeon: Damien Odonnell MD;  Location[facility] ENDOSCOPY;  Service: Gastroenterology;  Laterality: N/A;  pre dyspepsia  post normal  Darshan Garcias MD   • KNEE SURGERY     • SHOULDER SURGERY Right         Current Outpatient Medications:   •  ALPRAZolam (XANAX) 0.5 MG tablet, Take 1 tablet by mouth At Night As Needed for Anxiety., Disp: 30 tablet, Rfl: 5  •  aspirin (ECOTRIN LOW STRENGTH) 81 MG EC tablet, Take 81 mg by mouth Daily., Disp: , Rfl:   •  coenzyme Q10 100 MG capsule, Take 200 mg by mouth Every Evening., Disp: , Rfl:   •  cyclobenzaprine (FLEXERIL) 10 MG tablet, Take 1 tablet by mouth 3 (Three) Times a Day As Needed for Muscle Spasms., Disp: 30 tablet, Rfl: 1  •  diclofenac (Voltaren) 1 % gel gel, Apply 4 g topically to the appropriate area as directed 4 (Four) Times a Day As Needed. 1-3 times daily, Disp: , Rfl:   •  esomeprazole (nexIUM) 40 MG capsule, Take 1 capsule by mouth once daily, Disp: 90 capsule, Rfl: 1  •  finasteride (PROSCAR) 5 MG tablet, Take 1 tablet by mouth Daily., Disp: 90 tablet, Rfl: 3  •  glipizide (Glucotrol XL) 2.5 MG 24 hr tablet, Take 1 tablet by mouth Daily., Disp: 30 tablet, Rfl: 11  •  glucose blood (Contour Test) test strip, 1 each by Other route Daily. Use as instructed, Disp: 100 each,  Rfl: 3  •  ibuprofen (ADVIL,MOTRIN) 200 MG tablet, Take 200 mg by mouth Every 8 (Eight) Hours As Needed for Mild Pain ., Disp: , Rfl:   •  levothyroxine (SYNTHROID, LEVOTHROID) 50 MCG tablet, Take 1 tablet by mouth once daily, Disp: 90 tablet, Rfl: 3  •  metFORMIN ER (GLUCOPHAGE-XR) 750 MG 24 hr tablet, Take 1 tablet by mouth twice daily, Disp: 180 tablet, Rfl: 3  •  rosuvastatin (CRESTOR) 5 MG tablet, Take 1 tablet by mouth Every Other Day., Disp: 45 tablet, Rfl: 3  •  tamsulosin (FLOMAX) 0.4 MG capsule 24 hr capsule, TAKE 1 CAPSULE BY MOUTH DAILY AT BEDTIME., Disp: 90 capsule, Rfl: 3  •  traMADol (ULTRAM) 50 MG tablet, Take 1 tablet by mouth Daily As Needed for Moderate Pain ., Disp: 30 tablet, Rfl: 0  •  famciclovir (FAMVIR) 500 MG tablet, Take 1 tablet by mouth 3 (Three) Times a Day for 7 days., Disp: 21 tablet, Rfl: 0  •  methylPREDNISolone (MEDROL) 4 MG dose pack, Take as directed on package instructions., Disp: 1 tablet, Rfl: 0      Vitals:    11/04/21 1353   BP: 120/80   Pulse: 80   Temp: 97.1 °F (36.2 °C)   SpO2: 100%         11/04/21  1353   Weight: 93.4 kg (206 lb)       Body mass index is 25.08 kg/m².    Physical Exam  Vitals and nursing note reviewed.   Constitutional:       General: He is not in acute distress.     Appearance: Normal appearance. He is well-developed.   HENT:      Head: Normocephalic and atraumatic.      Right Ear: External ear normal.      Left Ear: External ear normal.      Nose: Nose normal.   Eyes:      Extraocular Movements: Extraocular movements intact.      Conjunctiva/sclera: Conjunctivae normal.      Pupils: Pupils are equal, round, and reactive to light.   Cardiovascular:      Rate and Rhythm: Normal rate and regular rhythm.      Pulses: Normal pulses.      Heart sounds: Normal heart sounds.   Pulmonary:      Effort: Pulmonary effort is normal.      Breath sounds: Normal breath sounds.   Abdominal:      General: Bowel sounds are normal.      Palpations: Abdomen is soft.    Musculoskeletal:         General: Normal range of motion.      Cervical back: Normal range of motion and neck supple. No rigidity. No muscular tenderness.   Skin:     General: Skin is warm and dry.      Findings: Rash ( Patient has an early raised rash it is not formed vesicles at this point it is distributed across the pelvic rim on the right has not reached into the lower abdomen yet) present.   Neurological:      General: No focal deficit present.      Mental Status: He is alert and oriented to person, place, and time.   Psychiatric:         Mood and Affect: Mood normal.         Behavior: Behavior normal.               Assessment/Plan   Diagnoses and all orders for this visit:    1. Herpes zoster without complication (Primary)    2. Chronic low back pain, unspecified back pain laterality, unspecified whether sciatica present  -     traMADol (ULTRAM) 50 MG tablet; Take 1 tablet by mouth Daily As Needed for Moderate Pain .  Dispense: 30 tablet; Refill: 0    Other orders  -     cyclobenzaprine (FLEXERIL) 10 MG tablet; Take 1 tablet by mouth 3 (Three) Times a Day As Needed for Muscle Spasms.  Dispense: 30 tablet; Refill: 1  -     famciclovir (FAMVIR) 500 MG tablet; Take 1 tablet by mouth 3 (Three) Times a Day for 7 days.  Dispense: 21 tablet; Refill: 0  -     methylPREDNISolone (MEDROL) 4 MG dose pack; Take as directed on package instructions.  Dispense: 1 tablet; Refill: 0    At this point patient is being treated for shingles with steroid Dosepak and Famvir. He also has some chronic low back pain tramadol seems to help him and a renewed prescription for that has been sent.

## 2021-12-01 ENCOUNTER — OFFICE VISIT (OUTPATIENT)
Dept: GASTROENTEROLOGY | Facility: CLINIC | Age: 81
End: 2021-12-01

## 2021-12-01 VITALS
OXYGEN SATURATION: 99 % | HEART RATE: 94 BPM | HEIGHT: 76 IN | BODY MASS INDEX: 25.82 KG/M2 | TEMPERATURE: 97.6 F | SYSTOLIC BLOOD PRESSURE: 132 MMHG | DIASTOLIC BLOOD PRESSURE: 78 MMHG | WEIGHT: 212 LBS

## 2021-12-01 DIAGNOSIS — Z78.9 NONSMOKER: ICD-10-CM

## 2021-12-01 DIAGNOSIS — K21.9 GASTROESOPHAGEAL REFLUX DISEASE WITHOUT ESOPHAGITIS: Primary | ICD-10-CM

## 2021-12-01 PROCEDURE — 99213 OFFICE O/P EST LOW 20 MIN: CPT | Performed by: CLINICAL NURSE SPECIALIST

## 2021-12-01 NOTE — PROGRESS NOTES
Caleb Wood  1940      12/1/2021  Chief Complaint   Patient presents with   • GI Problem     Discuss reflux         HPI    Caleb Wood is a  81 y.o. male here for a follow up visit for complaint of reflux and indigestion. Stable with nexium. No nausea or vomiting. No wt loss. No fever. No abdominal pain. Due for colonoscopy next year. Most recent endoscopy discussed.     Past Medical History:   Diagnosis Date   • Diabetes mellitus (HCC)     Type 2   • Disease of thyroid gland    • GERD (gastroesophageal reflux disease)    • Hemorrhoids    • Hx of colonic polyps    • Hypercholesteremia      Past Surgical History:   Procedure Laterality Date   • CHOLECYSTECTOMY     • COLONOSCOPY  01/17/2012    Colon polyp tissue not retrieved repeat exam in 5 years   • COLONOSCOPY  01/06/2009    adenomatous polyp x1   • COLONOSCOPY N/A 6/1/2017    Tubular adenoma Transvere colon, Diverticulosis repeat exam in 5 years   • ENDOSCOPY  06/25/2013    [facility]   • ENDOSCOPY N/A 1/29/2021    Multiple fundic gland polyps    • KNEE SURGERY     • SHOULDER SURGERY Right        Outpatient Medications Marked as Taking for the 12/1/21 encounter (Office Visit) with Deepti Brito APRN   Medication Sig Dispense Refill   • ALPRAZolam (XANAX) 0.5 MG tablet Take 1 tablet by mouth At Night As Needed for Anxiety. 30 tablet 5   • aspirin (ECOTRIN LOW STRENGTH) 81 MG EC tablet Take 81 mg by mouth Daily.     • coenzyme Q10 100 MG capsule Take 200 mg by mouth Every Evening.     • cyclobenzaprine (FLEXERIL) 10 MG tablet Take 1 tablet by mouth 3 (Three) Times a Day As Needed for Muscle Spasms. 30 tablet 1   • diclofenac (Voltaren) 1 % gel gel Apply 4 g topically to the appropriate area as directed 4 (Four) Times a Day As Needed. 1-3 times daily     • esomeprazole (nexIUM) 40 MG capsule Take 1 capsule by mouth once daily 90 capsule 1   • finasteride (PROSCAR) 5 MG tablet Take 1 tablet by mouth Daily. 90 tablet 3   • glipizide (Glucotrol XL) 2.5 MG 24 hr  tablet Take 1 tablet by mouth Daily. 30 tablet 11   • glucose blood (Contour Test) test strip 1 each by Other route Daily. Use as instructed 100 each 3   • ibuprofen (ADVIL,MOTRIN) 200 MG tablet Take 200 mg by mouth Every 8 (Eight) Hours As Needed for Mild Pain .     • levothyroxine (SYNTHROID, LEVOTHROID) 50 MCG tablet Take 1 tablet by mouth once daily 90 tablet 3   • metFORMIN ER (GLUCOPHAGE-XR) 750 MG 24 hr tablet Take 1 tablet by mouth twice daily 180 tablet 3   • rosuvastatin (CRESTOR) 5 MG tablet Take 1 tablet by mouth Every Other Day. 45 tablet 3   • tamsulosin (FLOMAX) 0.4 MG capsule 24 hr capsule TAKE 1 CAPSULE BY MOUTH DAILY AT BEDTIME. 90 capsule 3   • traMADol (ULTRAM) 50 MG tablet Take 1 tablet by mouth Daily As Needed for Moderate Pain . 30 tablet 0       Allergies   Allergen Reactions   • Hydrocodone Rash     Pt state he don't think hhe is allergic to.   • Metformin Diarrhea     Not allergic. Takes for diabetic       Social History     Socioeconomic History   • Marital status:    Tobacco Use   • Smoking status: Former Smoker   • Smokeless tobacco: Never Used   Substance and Sexual Activity   • Alcohol use: No   • Drug use: No   • Sexual activity: Defer       Family History   Problem Relation Age of Onset   • No Known Problems Mother    • No Known Problems Father    • Colon cancer Neg Hx    • Colon polyps Neg Hx        Review of Systems   Constitutional: Negative for activity change, appetite change, chills, diaphoresis, fatigue, fever and unexpected weight change.   HENT: Negative for ear pain, hearing loss, mouth sores, sore throat, trouble swallowing and voice change.    Eyes: Negative.    Respiratory: Negative for cough, choking, shortness of breath and wheezing.    Cardiovascular: Negative for chest pain and palpitations.   Gastrointestinal: Negative for abdominal pain, blood in stool, constipation, diarrhea, nausea and vomiting.   Endocrine: Negative for cold intolerance and heat  "intolerance.   Genitourinary: Negative for decreased urine volume, dysuria, frequency, hematuria and urgency.   Musculoskeletal: Negative for back pain, gait problem and myalgias.   Skin: Negative for color change, pallor and rash.   Allergic/Immunologic: Negative for food allergies and immunocompromised state.   Neurological: Negative for dizziness, tremors, seizures, syncope, weakness, light-headedness, numbness and headaches.   Hematological: Negative for adenopathy. Does not bruise/bleed easily.   Psychiatric/Behavioral: Negative for agitation and confusion. The patient is not nervous/anxious.    All other systems reviewed and are negative.      /78   Pulse 94   Temp 97.6 °F (36.4 °C)   Ht 193 cm (76\")   Wt 96.2 kg (212 lb)   SpO2 99%   BMI 25.81 kg/m²   Body mass index is 25.81 kg/m².    Physical Exam  Constitutional:       Appearance: He is well-developed.   HENT:      Head: Normocephalic and atraumatic.   Eyes:      Pupils: Pupils are equal, round, and reactive to light.   Neck:      Trachea: No tracheal deviation.   Cardiovascular:      Rate and Rhythm: Normal rate and regular rhythm.      Heart sounds: Normal heart sounds. No murmur heard.  No friction rub. No gallop.    Pulmonary:      Effort: Pulmonary effort is normal. No respiratory distress.      Breath sounds: Normal breath sounds. No wheezing or rales.   Chest:      Chest wall: No tenderness.   Abdominal:      General: Bowel sounds are normal. There is no distension.      Palpations: Abdomen is soft. Abdomen is not rigid.      Tenderness: There is no abdominal tenderness. There is no guarding or rebound.   Musculoskeletal:         General: No tenderness or deformity. Normal range of motion.      Cervical back: Normal range of motion and neck supple.   Skin:     General: Skin is warm and dry.      Coloration: Skin is not pale.      Findings: No rash.   Neurological:      Mental Status: He is alert and oriented to person, place, and time. "      Deep Tendon Reflexes: Reflexes are normal and symmetric.   Psychiatric:         Behavior: Behavior normal.         Thought Content: Thought content normal.         Judgment: Judgment normal.         ASSESSMENT AND PLAN    Patient's Body mass index is 25.81 kg/m². indicating that he is overweight (BMI 25-29.9). Obesity-related health conditions include the following: none. Obesity is unchanged. BMI is is above average; BMI management plan is completed. We discussed portion control and increasing exercise..    Diagnoses and all orders for this visit:    1. Gastroesophageal reflux disease without esophagitis (Primary)    2. Nonsmoker    continue Nexium   I discussed non pharmaceutical treatment of gerd.  This includes gradually losing weight to achieve ideal body wt., elevation of the head of bed by 4-6 inches, nothing to eat or drink 3 hours prior to lying down, avoiding tight clothing, stress reduction, tobacco cessation, reduction of alcohol intake, and dietary restrictions (avoiding caffeine, coffee, fatty foods, mints, chocolate, spicy foods and tomato based sauces as much as possible).    Due for colonoscopy next year we discussed this.   * Surgery not found *    There are no Patient Instructions on file for this visit.  Deepti Brito, APRN  13:24 CST  12/1/2021    Obesity, Adult  Obesity is the condition of having too much total body fat. Being overweight or obese means that your weight is greater than what is considered healthy for your body size. Obesity is determined by a measurement called BMI. BMI is an estimate of body fat and is calculated from height and weight. For adults, a BMI of 30 or higher is considered obese.  Obesity can eventually lead to other health concerns and major illnesses, including:  · Stroke.  · Coronary artery disease (CAD).  · Type 2 diabetes.  · Some types of cancer, including cancers of the colon, breast, uterus, and gallbladder.  · Osteoarthritis.  · High blood pressure  (hypertension).  · High cholesterol.  · Sleep apnea.  · Gallbladder stones.  · Infertility problems.  What are the causes?  The main cause of obesity is taking in (consuming) more calories than your body uses for energy. Other factors that contribute to this condition may include:  · Being born with genes that make you more likely to become obese.  · Having a medical condition that causes obesity. These conditions include:  ¨ Hypothyroidism.  ¨ Polycystic ovarian syndrome (PCOS).  ¨ Binge-eating disorder.  ¨ Cushing syndrome.  · Taking certain medicines, such as steroids, antidepressants, and seizure medicines.  · Not being physically active (sedentary lifestyle).  · Living where there are limited places to exercise safely or buy healthy foods.  · Not getting enough sleep.  What increases the risk?  The following factors may increase your risk of this condition:  · Having a family history of obesity.  · Being a woman of -American descent.  · Being a man of  descent.  What are the signs or symptoms?  Having excessive body fat is the main symptom of this condition.  How is this diagnosed?  This condition may be diagnosed based on:  · Your symptoms.  · Your medical history.  · A physical exam. Your health care provider may measure:  ¨ Your BMI. If you are an adult with a BMI between 25 and less than 30, you are considered overweight. If you are an adult with a BMI of 30 or higher, you are considered obese.  ¨ The distances around your hips and your waist (circumferences). These may be compared to each other to help diagnose your condition.  ¨ Your skinfold thickness. Your health care provider may gently pinch a fold of your skin and measure it.  How is this treated?  Treatment for this condition often includes changing your lifestyle. Treatment may include some or all of the following:  · Dietary changes. Work with your health care provider and a dietitian to set a weight-loss goal that is healthy and  reasonable for you. Dietary changes may include eating:  ¨ Smaller portions. A portion size is the amount of a particular food that is healthy for you to eat at one time. This varies from person to person.  ¨ Low-calorie or low-fat options.  ¨ More whole grains, fruits, and vegetables.  · Regular physical activity. This may include aerobic activity (cardio) and strength training.  · Medicine to help you lose weight. Your health care provider may prescribe medicine if you are unable to lose 1 pound a week after 6 weeks of eating more healthily and doing more physical activity.  · Surgery. Surgical options may include gastric banding and gastric bypass. Surgery may be done if:  ¨ Other treatments have not helped to improve your condition.  ¨ You have a BMI of 40 or higher.  ¨ You have life-threatening health problems related to obesity.  Follow these instructions at home:     Eating and drinking     · Follow recommendations from your health care provider about what you eat and drink. Your health care provider may advise you to:  ¨ Limit fast foods, sweets, and processed snack foods.  ¨ Choose low-fat options, such as low-fat milk instead of whole milk.  ¨ Eat 5 or more servings of fruits or vegetables every day.  ¨ Eat at home more often. This gives you more control over what you eat.  ¨ Choose healthy foods when you eat out.  ¨ Learn what a healthy portion size is.  ¨ Keep low-fat snacks on hand.  ¨ Avoid sugary drinks, such as soda, fruit juice, iced tea sweetened with sugar, and flavored milk.  ¨ Eat a healthy breakfast.  · Drink enough water to keep your urine clear or pale yellow.  · Do not go without eating for long periods of time (do not fast) or follow a fad diet. Fasting and fad diets can be unhealthy and even dangerous.  Physical Activity   · Exercise regularly, as told by your health care provider. Ask your health care provider what types of exercise are safe for you and how often you should  exercise.  · Warm up and stretch before being active.  · Cool down and stretch after being active.  · Rest between periods of activity.  Lifestyle   · Limit the time that you spend in front of your TV, computer, or video game system.  · Find ways to reward yourself that do not involve food.  · Limit alcohol intake to no more than 1 drink a day for nonpregnant women and 2 drinks a day for men. One drink equals 12 oz of beer, 5 oz of wine, or 1½ oz of hard liquor.  General instructions   · Keep a weight loss journal to keep track of the food you eat and how much you exercise you get.  · Take over-the-counter and prescription medicines only as told by your health care provider.  · Take vitamins and supplements only as told by your health care provider.  · Consider joining a support group. Your health care provider may be able to recommend a support group.  · Keep all follow-up visits as told by your health care provider. This is important.  Contact a health care provider if:  · You are unable to meet your weight loss goal after 6 weeks of dietary and lifestyle changes.  This information is not intended to replace advice given to you by your health care provider. Make sure you discuss any questions you have with your health care provider.  Document Released: 01/25/2006 Document Revised: 05/22/2017 Document Reviewed: 10/05/2016  [employer] Interactive Patient Education © 2017 [employer]      IF YOU SMOKE OR USE TOBACCO PLEASE READ THE FOLLOWING:    Why is smoking bad for me?  Smoking increases the risk of heart disease, lung disease, vascular disease, stroke, and cancer.     If you smoke, STOP!    If you would like more information on quitting smoking, please visit the [employer] website: www.redealize/corporate/healthier-together/smoke   This link will provide additional resources including the QUIT line and the [employer] support groups.     For more information:    Quit Now  [place]  1-800-QUIT-NOW  https://[place].quitlogix.org/en-[place]/

## 2022-01-10 ENCOUNTER — TELEPHONE (OUTPATIENT)
Dept: UROLOGY | Facility: CLINIC | Age: 82
End: 2022-01-10

## 2022-01-10 NOTE — TELEPHONE ENCOUNTER
Provider: ANDERS JOHNSTON  Caller: RANJAN RUBIO  Relationship to Patient: SELF    Phone Number: [phone]  Reason for Call: PT GOT THE RECALL LETTER TO SCHEDULE A FOLLOW UP APPT, APPT SCHEDULED FOR MARCH 18 @9:30AM, APPT IS SET AS A NEW PATIENT APPT NOT A FOLLOW UP APPT PER RECALL LETTER STATUS, IS THAT A PROBLEM?    PT ALSO STATES DR MARQUEZ PRESCRIBED finasteride (PROSCAR) 5MG TABS BUT DOESN'T FEEL LIKE THAT HAS HELPED HIS PROBLEM OF HIS BLADDER NOT EMPTYING. PT WANTED THAT NOTED FOR HIS UPCOMING VISIT. (PT WAS WONDERING WHY HE IS BEING SCHEDULED WITH ANDERS WHEN HE LAST SAW DR WILKERSON.)

## 2022-01-31 RX ORDER — GLIPIZIDE 2.5 MG/1
2.5 TABLET, EXTENDED RELEASE ORAL DAILY
Qty: 30 TABLET | Refills: 11 | Status: SHIPPED | OUTPATIENT
Start: 2022-01-31 | End: 2023-02-15 | Stop reason: SDUPTHER

## 2022-01-31 NOTE — TELEPHONE ENCOUNTER
Caller: Caleb Wood    Relationship: Self    Best call back number: [phone] (H)    Requested Prescriptions:      glipizide (Glucotrol XL) 2.5 MG 24 hr tablet  2.5 mg, Daily         Pharmacy where request should be sent:    [facility] [address] [place] [address] [phone]  - [phone]   [phone]  Additional details provided by patient:     Does the patient have less than a 3 day supply:  [] Yes  [] No    Cindy Stephenson Rep   01/31/22 11:53 CST

## 2022-02-09 DIAGNOSIS — E11.9 CONTROLLED TYPE 2 DIABETES MELLITUS WITHOUT COMPLICATION, WITHOUT LONG-TERM CURRENT USE OF INSULIN: ICD-10-CM

## 2022-02-10 RX ORDER — CARVEDILOL 25 MG/1
1 TABLET, FILM COATED ORAL DAILY
Qty: 100 EACH | Refills: 3 | Status: SHIPPED | OUTPATIENT
Start: 2022-02-10

## 2022-03-01 RX ORDER — LEVOTHYROXINE SODIUM 0.05 MG/1
TABLET ORAL
Qty: 90 TABLET | Refills: 1 | Status: SHIPPED | OUTPATIENT
Start: 2022-03-01 | End: 2022-09-06 | Stop reason: SDUPTHER

## 2022-03-18 ENCOUNTER — OFFICE VISIT (OUTPATIENT)
Dept: UROLOGY | Facility: CLINIC | Age: 82
End: 2022-03-18

## 2022-03-18 VITALS — HEIGHT: 76 IN | WEIGHT: 212 LBS | BODY MASS INDEX: 25.82 KG/M2 | TEMPERATURE: 98.3 F

## 2022-03-18 DIAGNOSIS — N40.1 BPH WITH OBSTRUCTION/LOWER URINARY TRACT SYMPTOMS: Primary | ICD-10-CM

## 2022-03-18 DIAGNOSIS — N13.8 BPH WITH OBSTRUCTION/LOWER URINARY TRACT SYMPTOMS: Primary | ICD-10-CM

## 2022-03-18 DIAGNOSIS — N52.9 ERECTILE DYSFUNCTION, UNSPECIFIED ERECTILE DYSFUNCTION TYPE: ICD-10-CM

## 2022-03-18 PROCEDURE — 99214 OFFICE O/P EST MOD 30 MIN: CPT | Performed by: PHYSICIAN ASSISTANT

## 2022-03-18 RX ORDER — TAMSULOSIN HYDROCHLORIDE 0.4 MG/1
CAPSULE ORAL
Qty: 180 CAPSULE | Refills: 3 | Status: SHIPPED | OUTPATIENT
Start: 2022-03-18 | End: 2023-01-17

## 2022-03-18 NOTE — PROGRESS NOTES
Subjective    Mr. Wood is 81 y.o. male    Chief Complaint: Follow up for BPH.    History of Present Illness  Benign Prostatic Hypertrophy  Patient complains of lower urinary tract symptoms.  His symptoms have worsened from last visit he reports frequency, incomplete emptying, intermittency, nocturia two times a night, straining, urgency and weak stream. He denies nothing. Patient states symptoms are of severe severity. Onset of symptoms was a few years ago and was gradual in onset. His AUA Symptom Score is, 23/35.He reports a history of no complicating symptoms. He denies flank pain, gross hematuria, kidney stones and recurrent UTI.  Patient has tried Alpha blockers and 5 alpha reductase inhibitors without improvement.  Patient had TURP in 1994.    Erectile Dysfunction  Patient complains of erectile dysfunction.  Patient is in a new relationship and patient has noticed decrease and erectile function.  He is not taking any erectile dysfunction medications in the past he is on no nitroglycerin and has no contraindications to taking this medication no heart disease.    The following portions of the patient's history were reviewed and updated as appropriate: allergies, current medications, past family history, past medical history, past social history, past surgical history and problem list.    Review of Systems   Constitutional: Negative for chills and fever.   Gastrointestinal: Negative for abdominal pain, anal bleeding and blood in stool.   Genitourinary: Positive for frequency and urgency. Negative for decreased urine volume, difficulty urinating, dysuria, enuresis, flank pain, genital sores, hematuria, penile discharge, penile pain, penile swelling, scrotal swelling and testicular pain.         Current Outpatient Medications:   •  ALPRAZolam (XANAX) 0.5 MG tablet, Take 1 tablet by mouth At Night As Needed for Anxiety., Disp: 30 tablet, Rfl: 5  •  aspirin (aspirin) 81 MG EC tablet, Take 81 mg by mouth Daily.,  Disp: , Rfl:   •  coenzyme Q10 100 MG capsule, Take 200 mg by mouth Every Evening., Disp: , Rfl:   •  cyclobenzaprine (FLEXERIL) 10 MG tablet, Take 1 tablet by mouth 3 (Three) Times a Day As Needed for Muscle Spasms., Disp: 30 tablet, Rfl: 1  •  diclofenac (VOLTAREN) 1 % gel gel, Apply 4 g topically to the appropriate area as directed 4 (Four) Times a Day As Needed. 1-3 times daily, Disp: , Rfl:   •  esomeprazole (nexIUM) 40 MG capsule, Take 1 capsule by mouth once daily, Disp: 90 capsule, Rfl: 1  •  finasteride (PROSCAR) 5 MG tablet, Take 1 tablet by mouth Daily., Disp: 90 tablet, Rfl: 3  •  glipizide (Glucotrol XL) 2.5 MG 24 hr tablet, Take 1 tablet by mouth Daily., Disp: 30 tablet, Rfl: 11  •  glucose blood (Contour Test) test strip, 1 each by Other route Daily. Use as instructed, Disp: 100 each, Rfl: 3  •  ibuprofen (ADVIL,MOTRIN) 200 MG tablet, Take 200 mg by mouth Every 8 (Eight) Hours As Needed for Mild Pain ., Disp: , Rfl:   •  levothyroxine (SYNTHROID, LEVOTHROID) 50 MCG tablet, Take 1 tablet by mouth once daily, Disp: 90 tablet, Rfl: 1  •  metFORMIN ER (GLUCOPHAGE-XR) 750 MG 24 hr tablet, Take 1 tablet by mouth twice daily, Disp: 180 tablet, Rfl: 3  •  rosuvastatin (CRESTOR) 5 MG tablet, Take 1 tablet by mouth Every Other Day., Disp: 45 tablet, Rfl: 3  •  tamsulosin (FLOMAX) 0.4 MG capsule 24 hr capsule, TAKE 1 CAPSULE BY MOUTH DAILY AT BEDTIME., Disp: 90 capsule, Rfl: 3  •  traMADol (ULTRAM) 50 MG tablet, Take 1 tablet by mouth Daily As Needed for Moderate Pain ., Disp: 30 tablet, Rfl: 0  •  tamsulosin (FLOMAX) 0.4 MG capsule 24 hr capsule, Take two 0.4 mg tamsulosin daily, Disp: 180 capsule, Rfl: 3    Past Medical History:   Diagnosis Date   • Diabetes mellitus (HCC)     Type 2   • Disease of thyroid gland    • GERD (gastroesophageal reflux disease)    • Hemorrhoids    • Hx of colonic polyps    • Hypercholesteremia        Past Surgical History:   Procedure Laterality Date   • CHOLECYSTECTOMY     •  "COLONOSCOPY  01/17/2012    Colon polyp tissue not retrieved repeat exam in 5 years   • COLONOSCOPY  01/06/2009    adenomatous polyp x1   • COLONOSCOPY N/A 6/1/2017    Tubular adenoma Transvere colon, Diverticulosis repeat exam in 5 years   • ENDOSCOPY  06/25/2013    [facility]   • ENDOSCOPY N/A 1/29/2021    Multiple fundic gland polyps    • KNEE SURGERY     • SHOULDER SURGERY Right        Social History     Socioeconomic History   • Marital status:    Tobacco Use   • Smoking status: Former Smoker   • Smokeless tobacco: Never Used   Substance and Sexual Activity   • Alcohol use: No   • Drug use: No   • Sexual activity: Defer       Family History   Problem Relation Age of Onset   • No Known Problems Mother    • No Known Problems Father    • Colon cancer Neg Hx    • Colon polyps Neg Hx        Objective    Temp 98.3 °F (36.8 °C)   Ht 193 cm (75.98\")   Wt 96.2 kg (212 lb)   BMI 25.82 kg/m²     Physical Exam  Vitals reviewed.   Constitutional:       Appearance: Normal appearance.   HENT:      Head: Normocephalic and atraumatic.      Nose: No congestion.   Pulmonary:      Effort: Pulmonary effort is normal.   Genitourinary:     Prostate: Enlarged. Not tender and no nodules present.      Rectum: Normal.   Neurological:      Mental Status: He is alert and oriented to person, place, and time.   Psychiatric:         Mood and Affect: Mood normal.         Behavior: Behavior normal.             Results for orders placed or performed in visit on 03/18/22   POC Urinalysis Dipstick, Multipro    Specimen: Urine   Result Value Ref Range    Color Yellow Yellow, Straw, Dark Yellow, Betsy    Clarity, UA Clear Clear    Glucose,  mg/dL (A) Negative, 1000 mg/dL (3+) mg/dL    Bilirubin Negative Negative    Ketones, UA Negative Negative    Specific Gravity  1.020 1.005 - 1.030    Blood, UA Negative Negative    pH, Urine 6.0 5.0 - 8.0    Protein, POC Negative Negative mg/dL    Urobilinogen, UA Normal Normal    Nitrite, UA Negative " Negative    Leukocytes Negative Negative   IPSS Questionnaire (AUA-7):  Incomplete emptying  Over the past month, how often have you had a sensation of not emptying your bladder completely after you finish?: Almost always (03/18/22 0944)  Frequency  Over the past month, how often have you had to urinate again less than two hours after you finishing urinating ?: About half the time (03/18/22 0944)  Intermittency  Over the past month, how often have you found you stopped and started again several time when you urinated ?: Almost always (03/18/22 0944)  Urgency  Over the last month, how difficult  have you found it to postpone urination ?: About half the time (03/18/22 0944)  Weak Stream  Over the past month, how often have you had a weak urinary stream ?: Almost always (03/18/22 0944)  Straining  Over the past month, how often have you had to push or strain to begin urination ?: Less than half the time (03/18/22 0944)  Nocturia  Over the past month, how many times did you most typically get up to urinate from the time you went to bed until the time you got up in the morning ?: Less than 1 time in 5 (03/18/22 0944)  Quality of life due to urinary symptoms  If you were to spend the rest of your life with your urinary condition the way it is now, how would feel about that?: Mixed - about equally satisfied (03/18/22 0944)    Scores  Total IPSS Score: 23 (03/18/22 0944)  Total Score = Symtomatic Level: severely symptomatic: 20-35 (03/18/22 0944)     .  Assessment and Plan    Diagnoses and all orders for this visit:    1. BPH with obstruction/lower urinary tract symptoms (Primary)  -     tamsulosin (FLOMAX) 0.4 MG capsule 24 hr capsule; Take two 0.4 mg tamsulosin daily  Dispense: 180 capsule; Refill: 3    2. Erectile dysfunction, unspecified erectile dysfunction type    Patient has noticed no difference or improvement in symptoms after starting finasteride at his last visit he is also on 1 tamsulosin daily.  He did have a  TURP done 1994.  At this point I will have him take 2 tamsulosin daily have him follow-up to see if there is has been symptom improvement if there is no change then I will schedule him for cystoscopy to evaluate for any evidence of obstruction.    Regarding erectile dysfunction he is in a new relationship and at this time he does not want any erectile dysfunction medications but wanted to find out whether this would be an option for him I did not see that he had any contraindication to taking erectile dysfunction medications he is not on any long-acting or short-acting nitroglycerin.  He does not have any evidence of previous coronary artery disease stents etc.  So therefore I do not see any reason he could not take PDE 5 inhibitors if he decided he wanted to do that.

## 2022-03-22 DIAGNOSIS — K21.9 GASTROESOPHAGEAL REFLUX DISEASE: ICD-10-CM

## 2022-03-22 RX ORDER — ESOMEPRAZOLE MAGNESIUM 40 MG/1
CAPSULE, DELAYED RELEASE ORAL
Qty: 90 CAPSULE | Refills: 3 | Status: SHIPPED | OUTPATIENT
Start: 2022-03-22 | End: 2022-12-05 | Stop reason: SDUPTHER

## 2022-04-14 ENCOUNTER — OFFICE VISIT (OUTPATIENT)
Dept: INTERNAL MEDICINE | Facility: CLINIC | Age: 82
End: 2022-04-14

## 2022-04-14 VITALS
TEMPERATURE: 97.8 F | BODY MASS INDEX: 26.79 KG/M2 | OXYGEN SATURATION: 99 % | HEIGHT: 76 IN | DIASTOLIC BLOOD PRESSURE: 80 MMHG | WEIGHT: 220 LBS | HEART RATE: 67 BPM | SYSTOLIC BLOOD PRESSURE: 124 MMHG

## 2022-04-14 DIAGNOSIS — I10 BENIGN ESSENTIAL HTN: ICD-10-CM

## 2022-04-14 DIAGNOSIS — E11.9 DIABETES MELLITUS WITHOUT COMPLICATION: ICD-10-CM

## 2022-04-14 DIAGNOSIS — N40.0 BENIGN PROSTATIC HYPERPLASIA WITHOUT LOWER URINARY TRACT SYMPTOMS: ICD-10-CM

## 2022-04-14 DIAGNOSIS — E78.00 PURE HYPERCHOLESTEROLEMIA: ICD-10-CM

## 2022-04-14 DIAGNOSIS — Z00.00 ENCOUNTER FOR SUBSEQUENT ANNUAL WELLNESS VISIT (AWV) IN MEDICARE PATIENT: Primary | ICD-10-CM

## 2022-04-14 DIAGNOSIS — E03.9 ACQUIRED HYPOTHYROIDISM: ICD-10-CM

## 2022-04-14 PROCEDURE — 1170F FXNL STATUS ASSESSED: CPT | Performed by: FAMILY MEDICINE

## 2022-04-14 PROCEDURE — G0439 PPPS, SUBSEQ VISIT: HCPCS | Performed by: FAMILY MEDICINE

## 2022-04-14 PROCEDURE — 1126F AMNT PAIN NOTED NONE PRSNT: CPT | Performed by: FAMILY MEDICINE

## 2022-04-14 PROCEDURE — 1159F MED LIST DOCD IN RCRD: CPT | Performed by: FAMILY MEDICINE

## 2022-04-14 PROCEDURE — 99214 OFFICE O/P EST MOD 30 MIN: CPT | Performed by: FAMILY MEDICINE

## 2022-04-14 NOTE — PROGRESS NOTES
The ABCs of the Annual Wellness Visit  Subsequent Medicare Wellness Visit    Chief Complaint   Patient presents with   • Medicare Wellness-subsequent      Subjective    History of Present Illness:  Caleb Wood is a 81 y.o. male who presents for a Subsequent Medicare Wellness Visit.    Benign prostatic hypertrophy  The patient reports he is doing well, though he does experience slow urine flow and incomplete bladder emptying. He does not experience dysuria. He has seen Dr. Post as well as a PA in urology, who increased the patient's tamsulosin to 2 pills per day, which he takes at bedtime. Urology indicated if this does not improve his symptoms in two months, he may need a cystoscopy. He denies lightheadedness with the increase in tamsulosin. The patient also takes finasteride in the morning. The patient has questions regarding atrophy as well as potential risks and complications of urologic procedures.    Benign hypertension  The patient has a blood pressure cuff at home but does not monitor his blood pressure.    Diabetes mellitus type 2  His blood work revealed an elevated glucose of 149 mg/dL and a hemoglobin A1c of 6.9 percent. The patient reports a previous hemoglobin A1c of 6.3 percent, and the last hemoglobin A1c performed by [facility] was 7.4 percent. His urinalysis revealed glucose and a small amount of albumin. The patient monitors his blood glucose at home and recently ordered a new Contour glucometer, as he is concerned that his current glucometer is inaccurate.    Hyperlipidemia  The patient's most recent lipid panel revealed normal total cholesterol, LDL 97 mg/dL, which is above his goal of 70 mg/dL, and triglycerides 190 mg/dL. The patient reports eating deli meats.    Trunk soreness  The patient notes occasional trunk soreness. He was concerned this could involve his kidneys and underwent a bilateral renal ultrasound which revealed normal kidneys aside from a couple of cysts.    Dry ear skin  The  patient notes dry, pruritic skin on his ear. He occasionally uses a topical treatment which was prescribed years ago by Dr. Garcias. He denies trying sweet oil or olive oil treatment.    Wellness and laboratory review  The patient attends annual eye examinations. His CBC, electrolytes, liver function, TSH, and uric acid were normal. He received a notice in the mail for cardiovascular screening but chose not to attend.    The patient is a . He has 1 biological son and 1 adopted daughter. The patient reports he is active, plays golf, and mows his yard.     Complete ROS reviewed and negative except as mentioned in the HPI.    The following portions of the patient's history were reviewed and   updated as appropriate: allergies, current medications, past family history, past medical history, past social history, past surgical history and problem list.    Compared to one year ago, the patient feels his physical   health is the same.    Compared to one year ago, the patient feels his mental   health is the same.    Recent Hospitalizations:  He was not admitted to the hospital during the last year.       Current Medical Providers:  Patient Care Team:  Ritu Dillon DO as PCP - Internal Medicine (Family Medicine)  Damien Odonnell MD as Consulting Physician (Gastroenterology)    Outpatient Medications Prior to Visit   Medication Sig Dispense Refill   • ALPRAZolam (XANAX) 0.5 MG tablet Take 1 tablet by mouth At Night As Needed for Anxiety. 30 tablet 5   • aspirin (aspirin) 81 MG EC tablet Take 81 mg by mouth Daily.     • coenzyme Q10 100 MG capsule Take 200 mg by mouth Every Evening.     • cyclobenzaprine (FLEXERIL) 10 MG tablet Take 1 tablet by mouth 3 (Three) Times a Day As Needed for Muscle Spasms. 30 tablet 1   • diclofenac (VOLTAREN) 1 % gel gel Apply 4 g topically to the appropriate area as directed 4 (Four) Times a Day As Needed. 1-3 times daily     • esomeprazole (nexIUM) 40 MG capsule Take 1 capsule by  mouth once daily 90 capsule 3   • finasteride (PROSCAR) 5 MG tablet Take 1 tablet by mouth Daily. 90 tablet 3   • glipizide (Glucotrol XL) 2.5 MG 24 hr tablet Take 1 tablet by mouth Daily. 30 tablet 11   • glucose blood (Contour Test) test strip 1 each by Other route Daily. Use as instructed 100 each 3   • ibuprofen (ADVIL,MOTRIN) 200 MG tablet Take 200 mg by mouth Every 8 (Eight) Hours As Needed for Mild Pain .     • levothyroxine (SYNTHROID, LEVOTHROID) 50 MCG tablet Take 1 tablet by mouth once daily 90 tablet 1   • metFORMIN ER (GLUCOPHAGE-XR) 750 MG 24 hr tablet Take 1 tablet by mouth twice daily 180 tablet 3   • rosuvastatin (CRESTOR) 5 MG tablet Take 1 tablet by mouth Every Other Day. 45 tablet 3   • tamsulosin (FLOMAX) 0.4 MG capsule 24 hr capsule Take two 0.4 mg tamsulosin daily (Patient taking differently: Take 2 capsules by mouth Daily. Take two 0.4 mg tamsulosin daily) 180 capsule 3   • traMADol (ULTRAM) 50 MG tablet Take 1 tablet by mouth Daily As Needed for Moderate Pain . 30 tablet 0   • tamsulosin (FLOMAX) 0.4 MG capsule 24 hr capsule TAKE 1 CAPSULE BY MOUTH DAILY AT BEDTIME. 90 capsule 3     No facility-administered medications prior to visit.       Opioid medication/s are on active medication list.  and I have evaluated his active treatment plan and pain score trends (see table).  Vitals:    04/14/22 1441   PainSc: 0-No pain     I have reviewed the chart for potential of high risk medication and harmful drug interactions in the elderly.            Aspirin is on active medication list. Aspirin use is indicated based on review of current medical condition/s. Pros and cons of this therapy have been discussed today. Benefits of this medication outweigh potential harm.  Patient has been encouraged to continue taking this medication.  .      Patient Active Problem List   Diagnosis   • Controlled type 2 diabetes mellitus without complication, without long-term current use of insulin (HCC)   • Hx of colonic  "polyps   • Acquired hypothyroidism   • Benign essential HTN   • Calculus of kidney   • Hematuria syndrome   • Hyperlipidemia   • Neutropenia (HCC)   • Neck pain   • Situational mixed anxiety and depressive disorder   • Diabetes mellitus without complication (HCC)   • History of colonic polyps   • Major depressive disorder with single episode, in partial remission (HCC)   • Chronic bilateral low back pain without sciatica   • Neuropathy, arm, right   • Dyspepsia     Advance Care Planning  Advance Directive is not on file.  ACP discussion was declined by the patient. Patient does not have an advance directive, declines further assistance.          Objective    Vitals:    04/14/22 1441   BP: 124/80   BP Location: Left arm   Patient Position: Sitting   Cuff Size: Adult   Pulse: 67   Temp: 97.8 °F (36.6 °C)   TempSrc: Temporal   SpO2: 99%   Weight: 99.8 kg (220 lb)   Height: 193 cm (76\")   PainSc: 0-No pain     BMI Readings from Last 1 Encounters:   04/14/22 26.78 kg/m²   BMI is above normal parameters. Recommendations include: exercise counseling and nutrition counseling    Does the patient have evidence of cognitive impairment? No    Physical Exam  Vitals and nursing note reviewed.   Constitutional:       Appearance: Normal appearance.      Comments: Extremities: Monofilament testing unremarkable.       HENT:      Head: Normocephalic and atraumatic.      Right Ear: External ear normal.      Left Ear: External ear normal.      Ears:      Comments: Dry skin on ear.       Nose: Nose normal.      Mouth/Throat:      Mouth: Mucous membranes are moist.   Eyes:      Extraocular Movements: Extraocular movements intact.      Conjunctiva/sclera: Conjunctivae normal.      Pupils: Pupils are equal, round, and reactive to light.   Cardiovascular:      Rate and Rhythm: Normal rate and regular rhythm.      Pulses: Normal pulses.      Heart sounds: No murmur heard.    No friction rub. No gallop.   Pulmonary:      Effort: Pulmonary " effort is normal.      Breath sounds: Normal breath sounds.   Abdominal:      General: Bowel sounds are normal.      Palpations: Abdomen is soft.   Musculoskeletal:         General: Normal range of motion.      Cervical back: Normal range of motion and neck supple.   Feet:      Comments: Fungus under toenails.    Skin:     General: Skin is warm and dry.      Capillary Refill: Capillary refill takes less than 2 seconds.   Neurological:      General: No focal deficit present.      Mental Status: He is alert and oriented to person, place, and time.      Cranial Nerves: No cranial nerve deficit.   Psychiatric:         Mood and Affect: Mood normal.         Behavior: Behavior normal.       Lab Results   Component Value Date    CHLPL 167 04/08/2022    TRIG 190 (H) 04/08/2022    HDL 37 (L) 04/08/2022    LDL 97 04/08/2022    VLDL 33 04/08/2022    HGBA1C 6.90 (H) 04/08/2022            HEALTH RISK ASSESSMENT    Smoking Status:  Social History     Tobacco Use   Smoking Status Former Smoker   Smokeless Tobacco Never Used     Alcohol Consumption:  Social History     Substance and Sexual Activity   Alcohol Use No     Fall Risk Screen:    STEADI Fall Risk Assessment was completed, and patient is at LOW risk for falls.Assessment completed on:4/14/2022    Depression Screening:  PHQ-2/PHQ-9 Depression Screening 4/14/2022   Retired Total Score -   Little Interest or Pleasure in Doing Things 0-->not at all   Feeling Down, Depressed or Hopeless 0-->not at all   PHQ-9: Brief Depression Severity Measure Score 0       Health Habits and Functional and Cognitive Screening:  Functional & Cognitive Status 4/14/2022   Do you have difficulty preparing food and eating? No   Do you have difficulty bathing yourself, getting dressed or grooming yourself? No   Do you have difficulty using the toilet? No   Do you have difficulty moving around from place to place? No   Do you have trouble with steps or getting out of a bed or a chair? No   Current Diet  Well Balanced Diet   Dental Exam Up to date   Eye Exam Up to date   Exercise (times per week) 0 times per week   Current Exercises Include No Regular Exercise   Do you need help using the phone?  No   Are you deaf or do you have serious difficulty hearing?  No   Do you need help with transportation? No   Do you need help shopping? No   Do you need help preparing meals?  No   Do you need help with housework?  No   Do you need help with laundry? No   Do you need help taking your medications? No   Do you need help managing money? No   Do you ever drive or ride in a car without wearing a seat belt? No   Have you felt unusual stress, anger or loneliness in the last month? No   Who do you live with? Alone   If you need help, do you have trouble finding someone available to you? No   Have you been bothered in the last four weeks by sexual problems? No   Do you have difficulty concentrating, remembering or making decisions? No       Age-appropriate Screening Schedule:  Refer to the list below for future screening recommendations based on patient's age, sex and/or medical conditions. Orders for these recommended tests are listed in the plan section. The patient has been provided with a written plan.    Health Maintenance   Topic Date Due   • TDAP/TD VACCINES (1 - Tdap) Never done   • DIABETIC EYE EXAM  06/11/2022   • INFLUENZA VACCINE  08/01/2022   • HEMOGLOBIN A1C  10/08/2022   • LIPID PANEL  04/08/2023   • URINE MICROALBUMIN  04/08/2023   • ZOSTER VACCINE  Completed              Assessment/Plan   CMS Preventative Services Quick Reference  Risk Factors Identified During Encounter  Obesity/Overweight   The above risks/problems have been discussed with the patient.  Follow up actions/plans if indicated are seen below in the Assessment/Plan Section.  Pertinent information has been shared with the patient in the After Visit Summary.    There are no diagnoses linked to this encounter.     1. Subsequent Medicare wellness visit  -  The patient will continue his current medications. We have reviewed them. We will see him back in 6 months unless he has a problem, in which case he will contact us.    2. Hyperlipidemia  Low-cholesterol diet  Continue current medication    3. Benign hypertension  Monitor blood pressure goal less than 130/80  Continue current medication    4. Diabetes mellitus type 2, without long-term use of insulin  - He is to monitor the portions that he is eating, as his hemoglobin A1c has actually increased slightly. He is amenable to this. He is getting a new glucometer so he can accurately check his glucose levels.    5. Hypothyroidism  Continue current medication    6. Benign prostatic hypertrophy  - His tamsulosin has been increased by the PA at urology to 2 pills a day. I have instructed the patient that if he becomes lightheaded, he is to hold this and call immediately. Otherwise, he is to continue his medications.    Follow Up:   Return in about 6 months (around 10/14/2022).     An After Visit Summary and PPPS were made available to the patient.                 Transcribed from ambient dictation for Ritu Dillon DO by Octavia Sutton.  04/14/22   18:07 CDT    Patient verbalized consent to the visit recording.

## 2022-04-18 DIAGNOSIS — F43.23 SITUATIONAL MIXED ANXIETY AND DEPRESSIVE DISORDER: ICD-10-CM

## 2022-04-18 RX ORDER — ALPRAZOLAM 0.5 MG/1
0.5 TABLET ORAL NIGHTLY PRN
Qty: 30 TABLET | Refills: 5 | Status: SHIPPED | OUTPATIENT
Start: 2022-04-18 | End: 2022-10-11

## 2022-05-11 ENCOUNTER — TELEPHONE (OUTPATIENT)
Dept: GASTROENTEROLOGY | Facility: CLINIC | Age: 82
End: 2022-05-11

## 2022-05-17 ENCOUNTER — TELEPHONE (OUTPATIENT)
Dept: UROLOGY | Facility: CLINIC | Age: 82
End: 2022-05-17

## 2022-05-17 NOTE — TELEPHONE ENCOUNTER
Called the patient in reference to his appointment on 5/18/2022. I didn't see a PSA lab work results in the computer and needed to know if he had this completed anywhere and if not we need to RS this appointment until this has been completed. Left patient a voicemail due to not being able to reach the patient or the patients alternate number. After leaving a message for the patient for the patient about his PSA Lab work I located a note that Dr. Post recommended no further PSA lab work I did notify the patient of my mistake However I did have to leave a message for the patient

## 2022-05-17 NOTE — PROGRESS NOTES
Subjective    Mr. Wood is 81 y.o. male    Chief Complaint: Follow up for BPH with urinary obstruction/ LUTS and Erectile Dysfunction.  History of Present Illness  Benign Prostatic Hypertrophy  Patient complains of lower urinary tract symptoms. He reports frequency, incomplete emptying, intermittency, nocturia two times a night, straining, urgency and weak stream. He denies nothing. Patient states symptoms are of severe severity. Onset of symptoms was a few years ago and was gradual in onset. His AUA Symptom Score is, 23/35.He reports a history of no complicating symptoms. He denies flank pain, gross hematuria, kidney stones and recurrent UTI.  Patient has tried Alpha blockers and 5 alpha reductase inhibitors without improvement.  Patient had TURP in 1994 with Dr. Dumont        The following portions of the patient's history were reviewed and updated as appropriate: allergies, current medications, past family history, past medical history, past social history, past surgical history and problem list.    Review of Systems   Constitutional: Negative for chills and fever.   Gastrointestinal: Negative for abdominal pain, anal bleeding and blood in stool.   Genitourinary: Negative for decreased urine volume, difficulty urinating, dysuria, enuresis, flank pain, frequency, genital sores, hematuria, penile discharge, penile pain, penile swelling, scrotal swelling, testicular pain and urgency.         Current Outpatient Medications:   •  ALPRAZolam (XANAX) 0.5 MG tablet, Take 1 tablet by mouth At Night As Needed for Anxiety., Disp: 30 tablet, Rfl: 5  •  aspirin (aspirin) 81 MG EC tablet, Take 81 mg by mouth Daily., Disp: , Rfl:   •  coenzyme Q10 100 MG capsule, Take 200 mg by mouth Every Evening., Disp: , Rfl:   •  cyclobenzaprine (FLEXERIL) 10 MG tablet, Take 1 tablet by mouth 3 (Three) Times a Day As Needed for Muscle Spasms., Disp: 30 tablet, Rfl: 1  •  diclofenac (VOLTAREN) 1 % gel gel, Apply 4 g topically to the  appropriate area as directed 4 (Four) Times a Day As Needed. 1-3 times daily, Disp: , Rfl:   •  esomeprazole (nexIUM) 40 MG capsule, Take 1 capsule by mouth once daily, Disp: 90 capsule, Rfl: 3  •  finasteride (PROSCAR) 5 MG tablet, Take 1 tablet by mouth Daily., Disp: 90 tablet, Rfl: 3  •  glipizide (Glucotrol XL) 2.5 MG 24 hr tablet, Take 1 tablet by mouth Daily., Disp: 30 tablet, Rfl: 11  •  glucose blood (Contour Test) test strip, 1 each by Other route Daily. Use as instructed, Disp: 100 each, Rfl: 3  •  ibuprofen (ADVIL,MOTRIN) 200 MG tablet, Take 200 mg by mouth Every 8 (Eight) Hours As Needed for Mild Pain ., Disp: , Rfl:   •  levothyroxine (SYNTHROID, LEVOTHROID) 50 MCG tablet, Take 1 tablet by mouth once daily, Disp: 90 tablet, Rfl: 1  •  metFORMIN ER (GLUCOPHAGE-XR) 750 MG 24 hr tablet, Take 1 tablet by mouth twice daily, Disp: 180 tablet, Rfl: 3  •  rosuvastatin (CRESTOR) 5 MG tablet, Take 1 tablet by mouth Every Other Day., Disp: 45 tablet, Rfl: 3  •  tamsulosin (FLOMAX) 0.4 MG capsule 24 hr capsule, Take two 0.4 mg tamsulosin daily (Patient taking differently: Take 2 capsules by mouth Daily. Take two 0.4 mg tamsulosin daily), Disp: 180 capsule, Rfl: 3  •  traMADol (ULTRAM) 50 MG tablet, Take 1 tablet by mouth Daily As Needed for Moderate Pain ., Disp: 30 tablet, Rfl: 0    Past Medical History:   Diagnosis Date   • Diabetes mellitus (HCC)     Type 2   • Disease of thyroid gland    • GERD (gastroesophageal reflux disease)    • Hemorrhoids    • Hx of colonic polyps    • Hypercholesteremia        Past Surgical History:   Procedure Laterality Date   • CHOLECYSTECTOMY     • COLONOSCOPY  01/17/2012    Colon polyp tissue not retrieved repeat exam in 5 years   • COLONOSCOPY  01/06/2009    adenomatous polyp x1   • COLONOSCOPY N/A 6/1/2017    Tubular adenoma Transvere colon, Diverticulosis repeat exam in 5 years   • ENDOSCOPY  06/25/2013    [facility]   • ENDOSCOPY N/A 1/29/2021    Multiple fundic gland polyps    • KNEE  "SURGERY     • SHOULDER SURGERY Right        Social History     Socioeconomic History   • Marital status:    Tobacco Use   • Smoking status: Former Smoker   • Smokeless tobacco: Never Used   Substance and Sexual Activity   • Alcohol use: No   • Drug use: No   • Sexual activity: Not Currently       Family History   Problem Relation Age of Onset   • No Known Problems Mother    • No Known Problems Father    • Colon cancer Neg Hx    • Colon polyps Neg Hx        Objective    Temp 98.9 °F (37.2 °C)   Ht 193 cm (75.98\")   Wt 99.8 kg (220 lb)   BMI 26.79 kg/m²     Physical Exam  Vitals reviewed.   Constitutional:       Appearance: Normal appearance.   HENT:      Head: Normocephalic and atraumatic.      Nose: No congestion.   Pulmonary:      Effort: Pulmonary effort is normal.   Skin:     Coloration: Skin is not pale.   Neurological:      Mental Status: He is alert and oriented to person, place, and time.   Psychiatric:         Mood and Affect: Mood normal.         Behavior: Behavior normal.             Results for orders placed or performed in visit on 05/18/22   POC Urinalysis Dipstick, Multipro    Specimen: Urine   Result Value Ref Range    Color Betsy Yellow, Straw, Dark Yellow, Betsy    Clarity, UA Clear Clear    Glucose,  mg/dL (A) Negative, 1000 mg/dL (3+) mg/dL    Bilirubin Negative Negative    Ketones, UA Negative Negative    Specific Gravity  1.015 1.005 - 1.030    Blood, UA Negative Negative    pH, Urine 5.5 5.0 - 8.0    Protein, POC Negative Negative mg/dL    Urobilinogen, UA Normal Normal    Nitrite, UA Negative Negative    Leukocytes Negative Negative   IPSS Questionnaire (AUA-7):  Incomplete emptying  Over the past month, how often have you had a sensation of not emptying your bladder completely after you finish?: More than half the time (05/18/22 1324)  Frequency  Over the past month, how often have you had to urinate again less than two hours after you finishing urinating ?: About half the " time (05/18/22 1324)  Intermittency  Over the past month, how often have you found you stopped and started again several time when you urinated ?: About half the time (05/18/22 1324)  Urgency  Over the last month, how difficult  have you found it to postpone urination ?: more than half the time (05/18/22 1324)  Weak Stream  Over the past month, how often have you had a weak urinary stream ?: Almost always (05/18/22 1324)  Straining  Over the past month, how often have you had to push or strain to begin urination ?: Less than half the time (05/18/22 1324)  Nocturia  Over the past month, how many times did you most typically get up to urinate from the time you went to bed until the time you got up in the morning ?: Less than half the time (05/18/22 1324)  Quality of life due to urinary symptoms  If you were to spend the rest of your life with your urinary condition the way it is now, how would feel about that?: Mixed - about equally satisfied (05/18/22 1324)    Scores  Total IPSS Score: 23 (05/18/22 1324)  Total Score = Symtomatic Level: severely symptomatic: 20-35 (05/18/22 1324)        Assessment and Plan    Diagnoses and all orders for this visit:    1. BPH with obstruction/lower urinary tract symptoms (Primary)  -     POC Urinalysis Dipstick, Multipro    Patient on max medical therapy 2 tamsulosin 1 finasteride daily is here for follow-up he has had no appreciable change or improvement in symptoms.  He did have a TURP done 1994 however I did state this possibility of regrowth of tissue as discussed at previous visit I feel he needs a cystoscopy to evaluate for evidence of obstruction and possible surgical intervention.

## 2022-05-18 ENCOUNTER — OFFICE VISIT (OUTPATIENT)
Dept: UROLOGY | Facility: CLINIC | Age: 82
End: 2022-05-18

## 2022-05-18 VITALS — WEIGHT: 220 LBS | TEMPERATURE: 98.9 F | HEIGHT: 76 IN | BODY MASS INDEX: 26.79 KG/M2

## 2022-05-18 DIAGNOSIS — N40.1 BPH WITH OBSTRUCTION/LOWER URINARY TRACT SYMPTOMS: Primary | ICD-10-CM

## 2022-05-18 DIAGNOSIS — N13.8 BPH WITH OBSTRUCTION/LOWER URINARY TRACT SYMPTOMS: Primary | ICD-10-CM

## 2022-05-18 LAB
BILIRUB BLD-MCNC: NEGATIVE MG/DL
CLARITY, POC: CLEAR
COLOR UR: ABNORMAL
GLUCOSE UR STRIP-MCNC: ABNORMAL MG/DL
KETONES UR QL: NEGATIVE
LEUKOCYTE EST, POC: NEGATIVE
NITRITE UR-MCNC: NEGATIVE MG/ML
PH UR: 5.5 [PH] (ref 5–8)
PROT UR STRIP-MCNC: NEGATIVE MG/DL
RBC # UR STRIP: NEGATIVE /UL
SP GR UR: 1.01 (ref 1–1.03)
UROBILINOGEN UR QL: NORMAL

## 2022-05-18 PROCEDURE — 99214 OFFICE O/P EST MOD 30 MIN: CPT | Performed by: PHYSICIAN ASSISTANT

## 2022-05-18 PROCEDURE — 81001 URINALYSIS AUTO W/SCOPE: CPT | Performed by: PHYSICIAN ASSISTANT

## 2022-06-01 ENCOUNTER — PROCEDURE VISIT (OUTPATIENT)
Dept: UROLOGY | Facility: CLINIC | Age: 82
End: 2022-06-01

## 2022-06-01 DIAGNOSIS — N40.1 BPH WITH OBSTRUCTION/LOWER URINARY TRACT SYMPTOMS: Primary | ICD-10-CM

## 2022-06-01 DIAGNOSIS — N13.8 BPH WITH OBSTRUCTION/LOWER URINARY TRACT SYMPTOMS: Primary | ICD-10-CM

## 2022-06-01 LAB
BILIRUB BLD-MCNC: NEGATIVE MG/DL
CLARITY, POC: CLEAR
COLOR UR: YELLOW
GLUCOSE UR STRIP-MCNC: ABNORMAL MG/DL
KETONES UR QL: NEGATIVE
LEUKOCYTE EST, POC: NEGATIVE
NITRITE UR-MCNC: NEGATIVE MG/ML
PH UR: 6 [PH] (ref 5–8)
PROT UR STRIP-MCNC: NEGATIVE MG/DL
RBC # UR STRIP: NEGATIVE /UL
SP GR UR: 1.01 (ref 1–1.03)
UROBILINOGEN UR QL: ABNORMAL

## 2022-06-01 PROCEDURE — 52000 CYSTOURETHROSCOPY: CPT | Performed by: UROLOGY

## 2022-06-01 PROCEDURE — 81001 URINALYSIS AUTO W/SCOPE: CPT | Performed by: UROLOGY

## 2022-06-02 NOTE — PROGRESS NOTES
Pre- operative diagnosis:  Recurrent bothersome LUTS on maximal medical therapy after previous TURP in 1994 by Dr. Dumont    Post operative diagnosis:  BPH with visually obstructing prostatic regrowth    Procedure:  The patient was prepped and draped in a normal sterile fashion.  The urethra was anesthetized with 2% lidocaine jelly.  A flexible cystoscope was introduced per urethra.      Urethra:  Normal    Bladder:  Normal mucosa, No bladder tumor and moderate trabeculation    Ureteral orifices:  Normal position bilaterally and Clear efflux bilaterally    Prostate:  Lateral lobe visual obstructing prostatic regrowth with kissing lateral lobes.    Patient tolerated the procedure well    Complications: none    Blood loss: minimal    Follow up:    Visual obstructing prostatic regrowth after TURP many years ago on maximal medical therapy with lifestyle limiting LUTS.  We discussed options for observation versus repeat TURP.  Patient would like to schedule transurethral resection of prostate for next month.  We discussed risks of the procedure including but not limited to infection, bleeding, need for additional procedures, injury to urethra and/or bladder, failure to improve his symptoms, need for postoperative catheter, complications of anesthesia.  He voices understanding and provided informed consent to proceed with TURP on 7/12/2022.  He will hold his aspirin a week prior.  Continue tamsulosin and finasteride.       This document has been signed by ЕЛЕНА Post MD on June 2, 2022 17:36 CDT

## 2022-06-07 ENCOUNTER — PREP FOR SURGERY (OUTPATIENT)
Dept: GASTROENTEROLOGY | Facility: CLINIC | Age: 82
End: 2022-06-07

## 2022-06-07 DIAGNOSIS — Z86.010 HX OF COLONIC POLYPS: Primary | ICD-10-CM

## 2022-06-15 ENCOUNTER — TELEPHONE (OUTPATIENT)
Dept: UROLOGY | Facility: CLINIC | Age: 82
End: 2022-06-15

## 2022-06-15 NOTE — TELEPHONE ENCOUNTER
Patient called to reschedule TURP. Would like to have this done after his colonoscopy. Possible beginning of august.     Sending  Enc to gabby.

## 2022-06-16 RX ORDER — METFORMIN HYDROCHLORIDE 750 MG/1
750 TABLET, EXTENDED RELEASE ORAL 2 TIMES DAILY
Qty: 180 TABLET | Refills: 3 | Status: SHIPPED | OUTPATIENT
Start: 2022-06-16

## 2022-06-16 NOTE — TELEPHONE ENCOUNTER
Caller: Caleb Wood    Relationship: Self    Best call back number:  [phone] (H)     Requested Prescriptions:   Requested Prescriptions     Pending Prescriptions Disp Refills   • metFORMIN ER (GLUCOPHAGE-XR) 750 MG 24 hr tablet 180 tablet 3     Sig: Take 1 tablet by mouth 2 (Two) Times a Day.        Pharmacy where request should be sent: [facility] [address] [place] [address] [phone] [facility] [phone] [facility]     Additional details provided by patient: will run out over the weekend     Does the patient have less than a 3 day supply:  [x] Yes  [] No    Cindy Guzmán Rep   06/16/22 12:00 CDT

## 2022-07-20 ENCOUNTER — HOSPITAL ENCOUNTER (OUTPATIENT)
Facility: HOSPITAL | Age: 82
Setting detail: HOSPITAL OUTPATIENT SURGERY
Discharge: HOME | End: 2022-07-20
Attending: INTERNAL MEDICINE | Admitting: INTERNAL MEDICINE

## 2022-07-20 ENCOUNTER — ANESTHESIA (OUTPATIENT)
Dept: GASTROENTEROLOGY | Facility: HOSPITAL | Age: 82
End: 2022-07-20

## 2022-07-20 ENCOUNTER — ANESTHESIA EVENT (OUTPATIENT)
Dept: GASTROENTEROLOGY | Facility: HOSPITAL | Age: 82
End: 2022-07-20

## 2022-07-20 VITALS
WEIGHT: 209 LBS | BODY MASS INDEX: 25.45 KG/M2 | HEART RATE: 52 BPM | HEIGHT: 76 IN | TEMPERATURE: 96.9 F | DIASTOLIC BLOOD PRESSURE: 72 MMHG | RESPIRATION RATE: 13 BRPM | SYSTOLIC BLOOD PRESSURE: 132 MMHG | OXYGEN SATURATION: 98 %

## 2022-07-20 DIAGNOSIS — Z86.010 HX OF COLONIC POLYPS: ICD-10-CM

## 2022-07-20 LAB — GLUCOSE BLDC GLUCOMTR-MCNC: 116 MG/DL (ref 70–130)

## 2022-07-20 PROCEDURE — 82962 GLUCOSE BLOOD TEST: CPT

## 2022-07-20 PROCEDURE — 45385 COLONOSCOPY W/LESION REMOVAL: CPT | Performed by: INTERNAL MEDICINE

## 2022-07-20 PROCEDURE — 25010000002 PROPOFOL 10 MG/ML EMULSION: Performed by: NURSE ANESTHETIST, CERTIFIED REGISTERED

## 2022-07-20 PROCEDURE — 88305 TISSUE EXAM BY PATHOLOGIST: CPT | Performed by: INTERNAL MEDICINE

## 2022-07-20 RX ORDER — SODIUM CHLORIDE 9 MG/ML
500 INJECTION, SOLUTION INTRAVENOUS CONTINUOUS PRN
Status: DISCONTINUED | OUTPATIENT
Start: 2022-07-20 | End: 2022-07-20 | Stop reason: HOSPADM

## 2022-07-20 RX ORDER — PROPOFOL 10 MG/ML
VIAL (ML) INTRAVENOUS AS NEEDED
Status: DISCONTINUED | OUTPATIENT
Start: 2022-07-20 | End: 2022-07-20 | Stop reason: SURG

## 2022-07-20 RX ORDER — LIDOCAINE HYDROCHLORIDE 10 MG/ML
0.5 INJECTION, SOLUTION EPIDURAL; INFILTRATION; INTRACAUDAL; PERINEURAL ONCE AS NEEDED
Status: DISCONTINUED | OUTPATIENT
Start: 2022-07-20 | End: 2022-07-20 | Stop reason: HOSPADM

## 2022-07-20 RX ORDER — ACETAMINOPHEN 500 MG
500 TABLET ORAL EVERY 6 HOURS PRN
COMMUNITY

## 2022-07-20 RX ORDER — LIDOCAINE HYDROCHLORIDE 20 MG/ML
INJECTION, SOLUTION EPIDURAL; INFILTRATION; INTRACAUDAL; PERINEURAL AS NEEDED
Status: DISCONTINUED | OUTPATIENT
Start: 2022-07-20 | End: 2022-07-20 | Stop reason: SURG

## 2022-07-20 RX ORDER — SODIUM CHLORIDE 0.9 % (FLUSH) 0.9 %
10 SYRINGE (ML) INJECTION AS NEEDED
Status: DISCONTINUED | OUTPATIENT
Start: 2022-07-20 | End: 2022-07-20 | Stop reason: HOSPADM

## 2022-07-20 RX ADMIN — LIDOCAINE HYDROCHLORIDE 60 MG: 20 INJECTION, SOLUTION EPIDURAL; INFILTRATION; INTRACAUDAL; PERINEURAL at 10:46

## 2022-07-20 RX ADMIN — SODIUM CHLORIDE 500 ML: 9 INJECTION, SOLUTION INTRAVENOUS at 09:51

## 2022-07-20 RX ADMIN — PROPOFOL 200 MG: 10 INJECTION, EMULSION INTRAVENOUS at 10:46

## 2022-07-20 NOTE — ANESTHESIA PREPROCEDURE EVALUATION
Anesthesia Evaluation     no history of anesthetic complications:  NPO Solid Status: > 8 hours  NPO Liquid Status: > 2 hours           Airway   Mallampati: II  TM distance: >3 FB  Neck ROM: full  Dental    (+) lower dentures and upper dentures    Pulmonary    Cardiovascular   Exercise tolerance: good (4-7 METS)    (+) hypertension, hyperlipidemia,       Neuro/Psych  (+) psychiatric history Depression,    GI/Hepatic/Renal/Endo    (+)  GERD,  diabetes mellitus, thyroid problem hypothyroidism    Musculoskeletal     Abdominal    Substance History      OB/GYN          Other                        Anesthesia Plan    ASA 2     MAC     intravenous induction     Anesthetic plan, risks, benefits, and alternatives have been provided, discussed and informed consent has been obtained with: patient.        CODE STATUS:

## 2022-07-20 NOTE — ANESTHESIA POSTPROCEDURE EVALUATION
"Patient: Caleb RUBIO    Procedure Summary     Date: 07/20/22 Room / Location: [facility] ENDOSCOPY 4 / [facility] ENDOSCOPY    Anesthesia Start: 1042 Anesthesia Stop: 1104    Procedure: COLONOSCOPY WITH ANESTHESIA (N/A ) Diagnosis:       Hx of colonic polyps      (Hx of colonic polyps [Z86.010])    Surgeons: Damien Odonnell MD Provider: Javier Mckinney CRNA    Anesthesia Type: MAC ASA Status: 2          Anesthesia Type: MAC    Vitals  Vitals Value Taken Time   /69 07/20/22 1111   Temp     Pulse 56 07/20/22 1114   Resp 16 07/20/22 1110   SpO2 97 % 07/20/22 1114   Vitals shown include unvalidated device data.        Post Anesthesia Care and Evaluation    Patient location during evaluation: PACU  Patient participation: complete - patient participated  Level of consciousness: awake and alert  Pain management: adequate    Airway patency: patent  Anesthetic complications: No anesthetic complications    Cardiovascular status: acceptable  Respiratory status: acceptable  Hydration status: acceptable    Comments: Blood pressure 108/69, pulse 58, temperature 96.9 °F (36.1 °C), temperature source Temporal, resp. rate 16, height 193 cm (76\"), weight 94.8 kg (209 lb), SpO2 97 %.    Pt discharged from PACU based on edwin score >8      "

## 2022-07-21 LAB
CYTO UR: NORMAL
LAB AP CASE REPORT: NORMAL
Lab: NORMAL
PATH REPORT.FINAL DX SPEC: NORMAL
PATH REPORT.GROSS SPEC: NORMAL

## 2022-07-29 ENCOUNTER — PRE-ADMISSION TESTING (OUTPATIENT)
Dept: PREADMISSION TESTING | Facility: HOSPITAL | Age: 82
End: 2022-07-29

## 2022-07-29 ENCOUNTER — LAB (OUTPATIENT)
Dept: LAB | Facility: HOSPITAL | Age: 82
End: 2022-07-29

## 2022-07-29 VITALS
RESPIRATION RATE: 18 BRPM | HEIGHT: 76 IN | DIASTOLIC BLOOD PRESSURE: 63 MMHG | WEIGHT: 214.07 LBS | SYSTOLIC BLOOD PRESSURE: 140 MMHG | HEART RATE: 57 BPM | OXYGEN SATURATION: 98 % | BODY MASS INDEX: 26.07 KG/M2

## 2022-07-29 DIAGNOSIS — N40.1 BPH WITH OBSTRUCTION/LOWER URINARY TRACT SYMPTOMS: ICD-10-CM

## 2022-07-29 DIAGNOSIS — N13.8 BPH WITH OBSTRUCTION/LOWER URINARY TRACT SYMPTOMS: ICD-10-CM

## 2022-07-29 LAB — SARS-COV-2 ORF1AB RESP QL NAA+PROBE: NOT DETECTED

## 2022-07-29 PROCEDURE — C9803 HOPD COVID-19 SPEC COLLECT: HCPCS

## 2022-07-29 PROCEDURE — 87077 CULTURE AEROBIC IDENTIFY: CPT | Performed by: UROLOGY

## 2022-07-29 PROCEDURE — 81001 URINALYSIS AUTO W/SCOPE: CPT | Performed by: UROLOGY

## 2022-07-29 PROCEDURE — 87086 URINE CULTURE/COLONY COUNT: CPT | Performed by: UROLOGY

## 2022-07-29 PROCEDURE — 80048 BASIC METABOLIC PNL TOTAL CA: CPT | Performed by: UROLOGY

## 2022-07-29 PROCEDURE — 93005 ELECTROCARDIOGRAM TRACING: CPT | Performed by: UROLOGY

## 2022-07-29 PROCEDURE — U0004 COV-19 TEST NON-CDC HGH THRU: HCPCS

## 2022-07-29 PROCEDURE — U0005 INFEC AGEN DETEC AMPLI PROBE: HCPCS

## 2022-07-29 PROCEDURE — 93010 ELECTROCARDIOGRAM REPORT: CPT | Performed by: INTERNAL MEDICINE

## 2022-07-29 PROCEDURE — 85027 COMPLETE CBC AUTOMATED: CPT | Performed by: UROLOGY

## 2022-07-29 PROCEDURE — 87186 SC STD MICRODIL/AGAR DIL: CPT | Performed by: UROLOGY

## 2022-07-30 LAB
QT INTERVAL: 432 MS
QTC INTERVAL: 442 MS

## 2022-07-31 DIAGNOSIS — N30.00 ACUTE CYSTITIS WITHOUT HEMATURIA: Primary | ICD-10-CM

## 2022-07-31 RX ORDER — SULFAMETHOXAZOLE AND TRIMETHOPRIM 800; 160 MG/1; MG/1
1 TABLET ORAL 2 TIMES DAILY
Qty: 28 TABLET | Refills: 0 | Status: SHIPPED | OUTPATIENT
Start: 2022-07-31 | End: 2022-08-14

## 2022-08-02 ENCOUNTER — HOSPITAL ENCOUNTER (OUTPATIENT)
Facility: HOSPITAL | Age: 82
Discharge: HOME | End: 2022-08-03
Attending: UROLOGY | Admitting: UROLOGY

## 2022-08-02 ENCOUNTER — ANESTHESIA EVENT (OUTPATIENT)
Dept: PERIOP | Facility: HOSPITAL | Age: 82
End: 2022-08-02

## 2022-08-02 ENCOUNTER — ANESTHESIA (OUTPATIENT)
Dept: PERIOP | Facility: HOSPITAL | Age: 82
End: 2022-08-02

## 2022-08-02 DIAGNOSIS — N13.8 BPH WITH OBSTRUCTION/LOWER URINARY TRACT SYMPTOMS: ICD-10-CM

## 2022-08-02 DIAGNOSIS — N40.1 BPH WITH OBSTRUCTION/LOWER URINARY TRACT SYMPTOMS: ICD-10-CM

## 2022-08-02 LAB
GLUCOSE BLDC GLUCOMTR-MCNC: 124 MG/DL (ref 70–130)
GLUCOSE BLDC GLUCOMTR-MCNC: 142 MG/DL (ref 70–130)
GLUCOSE BLDC GLUCOMTR-MCNC: 151 MG/DL (ref 70–130)

## 2022-08-02 PROCEDURE — A9270 NON-COVERED ITEM OR SERVICE: HCPCS | Performed by: UROLOGY

## 2022-08-02 PROCEDURE — 63710000001 TAMSULOSIN 0.4 MG CAPSULE: Performed by: UROLOGY

## 2022-08-02 PROCEDURE — S0260 H&P FOR SURGERY: HCPCS | Performed by: UROLOGY

## 2022-08-02 PROCEDURE — 25010000002 LEVOFLOXACIN PER 250 MG: Performed by: UROLOGY

## 2022-08-02 PROCEDURE — 25010000002 PROPOFOL 10 MG/ML EMULSION: Performed by: NURSE ANESTHETIST, CERTIFIED REGISTERED

## 2022-08-02 PROCEDURE — 63710000001 FINASTERIDE 5 MG TABLET: Performed by: UROLOGY

## 2022-08-02 PROCEDURE — 94799 UNLISTED PULMONARY SVC/PX: CPT

## 2022-08-02 PROCEDURE — G0378 HOSPITAL OBSERVATION PER HR: HCPCS

## 2022-08-02 PROCEDURE — 82962 GLUCOSE BLOOD TEST: CPT

## 2022-08-02 PROCEDURE — 25010000002 ONDANSETRON PER 1 MG: Performed by: NURSE ANESTHETIST, CERTIFIED REGISTERED

## 2022-08-02 PROCEDURE — 52630 REMOVE PROSTATE REGROWTH: CPT | Performed by: UROLOGY

## 2022-08-02 PROCEDURE — 63710000001 LEVOTHYROXINE 50 MCG TABLET: Performed by: UROLOGY

## 2022-08-02 PROCEDURE — 63710000001 ROSUVASTATIN 10 MG TABLET: Performed by: UROLOGY

## 2022-08-02 PROCEDURE — 25010000002 FENTANYL CITRATE (PF) 100 MCG/2ML SOLUTION: Performed by: NURSE ANESTHETIST, CERTIFIED REGISTERED

## 2022-08-02 PROCEDURE — 63710000001 OPIUM-BELLADONNA 16.2-30 MG SUPPOSITORY: Performed by: UROLOGY

## 2022-08-02 PROCEDURE — 63710000001 SULFAMETHOXAZOLE-TRIMETHOPRIM 800-160 MG TABLET: Performed by: UROLOGY

## 2022-08-02 PROCEDURE — 63710000001 ACETAMINOPHEN 325 MG TABLET: Performed by: UROLOGY

## 2022-08-02 PROCEDURE — 88305 TISSUE EXAM BY PATHOLOGIST: CPT | Performed by: UROLOGY

## 2022-08-02 PROCEDURE — 63710000001 ALPRAZOLAM 0.5 MG TABLET: Performed by: UROLOGY

## 2022-08-02 RX ORDER — SODIUM CHLORIDE 0.9 % (FLUSH) 0.9 %
10 SYRINGE (ML) INJECTION EVERY 12 HOURS SCHEDULED
Status: DISCONTINUED | OUTPATIENT
Start: 2022-08-02 | End: 2022-08-02 | Stop reason: HOSPADM

## 2022-08-02 RX ORDER — SODIUM CHLORIDE, SODIUM LACTATE, POTASSIUM CHLORIDE, CALCIUM CHLORIDE 600; 310; 30; 20 MG/100ML; MG/100ML; MG/100ML; MG/100ML
9 INJECTION, SOLUTION INTRAVENOUS CONTINUOUS
Status: DISCONTINUED | OUTPATIENT
Start: 2022-08-02 | End: 2022-08-03 | Stop reason: HOSPADM

## 2022-08-02 RX ORDER — DROPERIDOL 2.5 MG/ML
0.62 INJECTION, SOLUTION INTRAMUSCULAR; INTRAVENOUS ONCE AS NEEDED
Status: DISCONTINUED | OUTPATIENT
Start: 2022-08-02 | End: 2022-08-02 | Stop reason: HOSPADM

## 2022-08-02 RX ORDER — FINASTERIDE 5 MG/1
5 TABLET, FILM COATED ORAL DAILY
Status: DISCONTINUED | OUTPATIENT
Start: 2022-08-02 | End: 2022-08-03 | Stop reason: HOSPADM

## 2022-08-02 RX ORDER — MAGNESIUM HYDROXIDE 1200 MG/15ML
LIQUID ORAL AS NEEDED
Status: DISCONTINUED | OUTPATIENT
Start: 2022-08-02 | End: 2022-08-02 | Stop reason: HOSPADM

## 2022-08-02 RX ORDER — SODIUM CHLORIDE 0.9 % (FLUSH) 0.9 %
10 SYRINGE (ML) INJECTION AS NEEDED
Status: DISCONTINUED | OUTPATIENT
Start: 2022-08-02 | End: 2022-08-02 | Stop reason: HOSPADM

## 2022-08-02 RX ORDER — LIDOCAINE HYDROCHLORIDE 10 MG/ML
0.5 INJECTION, SOLUTION EPIDURAL; INFILTRATION; INTRACAUDAL; PERINEURAL ONCE AS NEEDED
Status: DISCONTINUED | OUTPATIENT
Start: 2022-08-02 | End: 2022-08-02 | Stop reason: HOSPADM

## 2022-08-02 RX ORDER — OXYCODONE AND ACETAMINOPHEN 10; 325 MG/1; MG/1
1 TABLET ORAL ONCE AS NEEDED
Status: DISCONTINUED | OUTPATIENT
Start: 2022-08-02 | End: 2022-08-02 | Stop reason: HOSPADM

## 2022-08-02 RX ORDER — ONDANSETRON 2 MG/ML
4 INJECTION INTRAMUSCULAR; INTRAVENOUS EVERY 6 HOURS PRN
Status: DISCONTINUED | OUTPATIENT
Start: 2022-08-02 | End: 2022-08-03 | Stop reason: HOSPADM

## 2022-08-02 RX ORDER — FENTANYL CITRATE 50 UG/ML
25 INJECTION, SOLUTION INTRAMUSCULAR; INTRAVENOUS
Status: DISCONTINUED | OUTPATIENT
Start: 2022-08-02 | End: 2022-08-02 | Stop reason: HOSPADM

## 2022-08-02 RX ORDER — SODIUM CHLORIDE, SODIUM LACTATE, POTASSIUM CHLORIDE, CALCIUM CHLORIDE 600; 310; 30; 20 MG/100ML; MG/100ML; MG/100ML; MG/100ML
75 INJECTION, SOLUTION INTRAVENOUS CONTINUOUS
Status: DISCONTINUED | OUTPATIENT
Start: 2022-08-02 | End: 2022-08-03 | Stop reason: HOSPADM

## 2022-08-02 RX ORDER — ALPRAZOLAM 0.5 MG/1
0.5 TABLET ORAL NIGHTLY PRN
Status: DISCONTINUED | OUTPATIENT
Start: 2022-08-02 | End: 2022-08-03 | Stop reason: HOSPADM

## 2022-08-02 RX ORDER — ACETAMINOPHEN 325 MG/1
650 TABLET ORAL EVERY 4 HOURS PRN
Status: DISCONTINUED | OUTPATIENT
Start: 2022-08-02 | End: 2022-08-03 | Stop reason: HOSPADM

## 2022-08-02 RX ORDER — ONDANSETRON 2 MG/ML
4 INJECTION INTRAMUSCULAR; INTRAVENOUS ONCE AS NEEDED
Status: DISCONTINUED | OUTPATIENT
Start: 2022-08-02 | End: 2022-08-02 | Stop reason: HOSPADM

## 2022-08-02 RX ORDER — PANTOPRAZOLE SODIUM 40 MG/1
40 TABLET, DELAYED RELEASE ORAL EVERY MORNING
Refills: 3 | Status: DISCONTINUED | OUTPATIENT
Start: 2022-08-03 | End: 2022-08-03 | Stop reason: HOSPADM

## 2022-08-02 RX ORDER — SULFAMETHOXAZOLE AND TRIMETHOPRIM 800; 160 MG/1; MG/1
1 TABLET ORAL 2 TIMES DAILY
Status: DISCONTINUED | OUTPATIENT
Start: 2022-08-02 | End: 2022-08-03 | Stop reason: HOSPADM

## 2022-08-02 RX ORDER — TAMSULOSIN HYDROCHLORIDE 0.4 MG/1
0.8 CAPSULE ORAL DAILY
Status: DISCONTINUED | OUTPATIENT
Start: 2022-08-02 | End: 2022-08-03 | Stop reason: HOSPADM

## 2022-08-02 RX ORDER — ATROPA BELLADONNA AND OPIUM 16.2; 3 MG/1; MG/1
SUPPOSITORY RECTAL
Status: DISPENSED
Start: 2022-08-02 | End: 2022-08-03

## 2022-08-02 RX ORDER — LABETALOL HYDROCHLORIDE 5 MG/ML
5 INJECTION, SOLUTION INTRAVENOUS
Status: DISCONTINUED | OUTPATIENT
Start: 2022-08-02 | End: 2022-08-02 | Stop reason: HOSPADM

## 2022-08-02 RX ORDER — PROPOFOL 10 MG/ML
VIAL (ML) INTRAVENOUS AS NEEDED
Status: DISCONTINUED | OUTPATIENT
Start: 2022-08-02 | End: 2022-08-02 | Stop reason: SURG

## 2022-08-02 RX ORDER — ATROPA BELLADONNA AND OPIUM 16.2; 6 MG/1; MG/1
SUPPOSITORY RECTAL AS NEEDED
Status: DISCONTINUED | OUTPATIENT
Start: 2022-08-02 | End: 2022-08-02 | Stop reason: HOSPADM

## 2022-08-02 RX ORDER — DEXTROSE MONOHYDRATE 25 G/50ML
25 INJECTION, SOLUTION INTRAVENOUS
Status: DISCONTINUED | OUTPATIENT
Start: 2022-08-02 | End: 2022-08-03 | Stop reason: HOSPADM

## 2022-08-02 RX ORDER — NICOTINE POLACRILEX 4 MG
15 LOZENGE BUCCAL
Status: DISCONTINUED | OUTPATIENT
Start: 2022-08-02 | End: 2022-08-03 | Stop reason: HOSPADM

## 2022-08-02 RX ORDER — CYCLOBENZAPRINE HCL 10 MG
10 TABLET ORAL 3 TIMES DAILY PRN
Status: DISCONTINUED | OUTPATIENT
Start: 2022-08-02 | End: 2022-08-03 | Stop reason: HOSPADM

## 2022-08-02 RX ORDER — ONDANSETRON 2 MG/ML
INJECTION INTRAMUSCULAR; INTRAVENOUS AS NEEDED
Status: DISCONTINUED | OUTPATIENT
Start: 2022-08-02 | End: 2022-08-02 | Stop reason: SURG

## 2022-08-02 RX ORDER — FLUMAZENIL 0.1 MG/ML
0.2 INJECTION INTRAVENOUS AS NEEDED
Status: DISCONTINUED | OUTPATIENT
Start: 2022-08-02 | End: 2022-08-02 | Stop reason: HOSPADM

## 2022-08-02 RX ORDER — NICOTINE POLACRILEX 4 MG
15 LOZENGE BUCCAL
Status: DISCONTINUED | OUTPATIENT
Start: 2022-08-02 | End: 2022-08-02 | Stop reason: SDUPTHER

## 2022-08-02 RX ORDER — ZOLPIDEM TARTRATE 5 MG/1
5 TABLET ORAL NIGHTLY PRN
Status: DISCONTINUED | OUTPATIENT
Start: 2022-08-02 | End: 2022-08-03 | Stop reason: HOSPADM

## 2022-08-02 RX ORDER — ONDANSETRON 4 MG/1
4 TABLET, FILM COATED ORAL EVERY 6 HOURS PRN
Status: DISCONTINUED | OUTPATIENT
Start: 2022-08-02 | End: 2022-08-03 | Stop reason: HOSPADM

## 2022-08-02 RX ORDER — ATROPA BELLADONNA AND OPIUM 16.2; 3 MG/1; MG/1
30 SUPPOSITORY RECTAL DAILY PRN
Status: DISCONTINUED | OUTPATIENT
Start: 2022-08-02 | End: 2022-08-03 | Stop reason: HOSPADM

## 2022-08-02 RX ORDER — ROSUVASTATIN CALCIUM 10 MG/1
5 TABLET, COATED ORAL EVERY OTHER DAY
Status: DISCONTINUED | OUTPATIENT
Start: 2022-08-02 | End: 2022-08-03 | Stop reason: HOSPADM

## 2022-08-02 RX ORDER — LIDOCAINE HYDROCHLORIDE 20 MG/ML
INJECTION, SOLUTION EPIDURAL; INFILTRATION; INTRACAUDAL; PERINEURAL AS NEEDED
Status: DISCONTINUED | OUTPATIENT
Start: 2022-08-02 | End: 2022-08-02 | Stop reason: SURG

## 2022-08-02 RX ORDER — DEXTROSE MONOHYDRATE 25 G/50ML
25 INJECTION, SOLUTION INTRAVENOUS
Status: DISCONTINUED | OUTPATIENT
Start: 2022-08-02 | End: 2022-08-02 | Stop reason: SDUPTHER

## 2022-08-02 RX ORDER — INSULIN LISPRO 100 [IU]/ML
0-9 INJECTION, SOLUTION INTRAVENOUS; SUBCUTANEOUS
Status: DISCONTINUED | OUTPATIENT
Start: 2022-08-02 | End: 2022-08-03 | Stop reason: HOSPADM

## 2022-08-02 RX ORDER — LEVOTHYROXINE SODIUM 0.05 MG/1
50 TABLET ORAL
Status: DISCONTINUED | OUTPATIENT
Start: 2022-08-02 | End: 2022-08-03 | Stop reason: HOSPADM

## 2022-08-02 RX ORDER — NALOXONE HCL 0.4 MG/ML
0.4 VIAL (ML) INJECTION AS NEEDED
Status: DISCONTINUED | OUTPATIENT
Start: 2022-08-02 | End: 2022-08-02 | Stop reason: HOSPADM

## 2022-08-02 RX ORDER — OXYCODONE AND ACETAMINOPHEN 7.5; 325 MG/1; MG/1
2 TABLET ORAL EVERY 4 HOURS PRN
Status: DISCONTINUED | OUTPATIENT
Start: 2022-08-02 | End: 2022-08-02 | Stop reason: HOSPADM

## 2022-08-02 RX ORDER — FENTANYL CITRATE 50 UG/ML
INJECTION, SOLUTION INTRAMUSCULAR; INTRAVENOUS AS NEEDED
Status: DISCONTINUED | OUTPATIENT
Start: 2022-08-02 | End: 2022-08-02 | Stop reason: SURG

## 2022-08-02 RX ORDER — SODIUM CHLORIDE 0.9 % (FLUSH) 0.9 %
3 SYRINGE (ML) INJECTION AS NEEDED
Status: DISCONTINUED | OUTPATIENT
Start: 2022-08-02 | End: 2022-08-02 | Stop reason: HOSPADM

## 2022-08-02 RX ORDER — LEVOFLOXACIN 5 MG/ML
500 INJECTION, SOLUTION INTRAVENOUS ONCE
Status: COMPLETED | OUTPATIENT
Start: 2022-08-02 | End: 2022-08-02

## 2022-08-02 RX ORDER — IBUPROFEN 600 MG/1
600 TABLET ORAL ONCE AS NEEDED
Status: DISCONTINUED | OUTPATIENT
Start: 2022-08-02 | End: 2022-08-02 | Stop reason: HOSPADM

## 2022-08-02 RX ORDER — SODIUM CHLORIDE, SODIUM LACTATE, POTASSIUM CHLORIDE, CALCIUM CHLORIDE 600; 310; 30; 20 MG/100ML; MG/100ML; MG/100ML; MG/100ML
1000 INJECTION, SOLUTION INTRAVENOUS CONTINUOUS
Status: DISCONTINUED | OUTPATIENT
Start: 2022-08-02 | End: 2022-08-03 | Stop reason: HOSPADM

## 2022-08-02 RX ORDER — TRAMADOL HYDROCHLORIDE 50 MG/1
50 TABLET ORAL EVERY 6 HOURS PRN
Status: DISCONTINUED | OUTPATIENT
Start: 2022-08-02 | End: 2022-08-03 | Stop reason: HOSPADM

## 2022-08-02 RX ORDER — DEXTROSE MONOHYDRATE 25 G/50ML
12.5 INJECTION, SOLUTION INTRAVENOUS AS NEEDED
Status: DISCONTINUED | OUTPATIENT
Start: 2022-08-02 | End: 2022-08-02 | Stop reason: HOSPADM

## 2022-08-02 RX ADMIN — SODIUM CHLORIDE, POTASSIUM CHLORIDE, SODIUM LACTATE AND CALCIUM CHLORIDE 75 ML/HR: 600; 310; 30; 20 INJECTION, SOLUTION INTRAVENOUS at 17:28

## 2022-08-02 RX ADMIN — PROPOFOL 150 MG: 10 INJECTION, EMULSION INTRAVENOUS at 13:09

## 2022-08-02 RX ADMIN — LIDOCAINE HYDROCHLORIDE 80 MG: 20 INJECTION, SOLUTION EPIDURAL; INFILTRATION; INTRACAUDAL at 13:09

## 2022-08-02 RX ADMIN — FENTANYL CITRATE 100 MCG: 50 INJECTION, SOLUTION INTRAMUSCULAR; INTRAVENOUS at 13:09

## 2022-08-02 RX ADMIN — ALPRAZOLAM 0.5 MG: 0.5 TABLET ORAL at 21:46

## 2022-08-02 RX ADMIN — SODIUM CHLORIDE, POTASSIUM CHLORIDE, SODIUM LACTATE AND CALCIUM CHLORIDE 1000 ML: 600; 310; 30; 20 INJECTION, SOLUTION INTRAVENOUS at 12:05

## 2022-08-02 RX ADMIN — ONDANSETRON 4 MG: 2 INJECTION INTRAMUSCULAR; INTRAVENOUS at 13:53

## 2022-08-02 RX ADMIN — LEVOFLOXACIN 500 MG: 5 INJECTION, SOLUTION INTRAVENOUS at 11:25

## 2022-08-02 RX ADMIN — TAMSULOSIN HYDROCHLORIDE 0.8 MG: 0.4 CAPSULE ORAL at 17:27

## 2022-08-02 RX ADMIN — FINASTERIDE 5 MG: 5 TABLET, FILM COATED ORAL at 17:28

## 2022-08-02 RX ADMIN — ROSUVASTATIN CALCIUM 5 MG: 10 TABLET, FILM COATED ORAL at 17:28

## 2022-08-02 RX ADMIN — LEVOTHYROXINE SODIUM 50 MCG: 50 TABLET ORAL at 17:27

## 2022-08-02 RX ADMIN — ACETAMINOPHEN 650 MG: 325 TABLET ORAL at 21:46

## 2022-08-02 RX ADMIN — ACETAMINOPHEN 650 MG: 325 TABLET ORAL at 15:56

## 2022-08-02 RX ADMIN — SULFAMETHOXAZOLE AND TRIMETHOPRIM 1 TABLET: 800; 160 TABLET ORAL at 20:18

## 2022-08-02 NOTE — ANESTHESIA POSTPROCEDURE EVALUATION
"Patient: Caleb RUBIO    Procedure Summary     Date: 08/02/22 Room / Location:  [facility] OR  /  [facility] OR    Anesthesia Start: 1305 Anesthesia Stop: 1405    Procedure: CYSTOSCOPY TRANSURETHRAL RESECTION OF PROSTATE (N/A Bladder) Diagnosis:       BPH with obstruction/lower urinary tract symptoms      (BPH with obstruction/lower urinary tract symptoms [N40.1, N13.8])    Surgeons: López Post MD Provider: Jr Mccall CRNA    Anesthesia Type: general ASA Status: 2          Anesthesia Type: general    Vitals  Vitals Value Taken Time   /72 08/02/22 1455   Temp 97.4 °F (36.3 °C) 08/02/22 1455   Pulse 48 08/02/22 1502   Resp 10 08/02/22 1455   SpO2 97 % 08/02/22 1502   Vitals shown include unvalidated device data.        Post Anesthesia Care and Evaluation    Patient location during evaluation: PACU  Patient participation: complete - patient participated  Level of consciousness: awake and alert  Pain management: adequate    Airway patency: patent  Anesthetic complications: No anesthetic complications    Cardiovascular status: acceptable  Respiratory status: acceptable  Hydration status: acceptable    Comments: Blood pressure 171/64, pulse 50, temperature 97.5 °F (36.4 °C), temperature source Oral, resp. rate 16, height 193 cm (75.98\"), weight 96.2 kg (212 lb 1.3 oz), SpO2 100 %.    Pt discharged from PACU based on edwin score >8      "

## 2022-08-02 NOTE — ANESTHESIA PROCEDURE NOTES
Airway  Urgency: elective    Date/Time: 8/2/2022 1:09 PM  Airway not difficult    General Information and Staff    Patient location during procedure: OR  CRNA/CAA: Jr Mccall CRNA    Indications and Patient Condition    Preoxygenated: yes  Mask difficulty assessment: 0 - not attempted    Final Airway Details  Final airway type: supraglottic airway      Successful airway: I-gel  Size 5    Number of attempts at approach: 1  Assessment: lips, teeth, and gum same as pre-op

## 2022-08-02 NOTE — PLAN OF CARE
Goal Outcome Evaluation:  Plan of Care Reviewed With: patient           Outcome Evaluation: IVF; CBI, urine clear yellow; SCD; accu check; safety maintained

## 2022-08-02 NOTE — H&P
Subjective     Mr. Wood is 81 y.o. male     Chief Complaint: Follow up for BPH with urinary obstruction/ LUTS and Erectile Dysfunction.  History of Present Illness  Benign Prostatic Hypertrophy  81-year-old with recurrent lifestyle limiting bothersome LUTS on maximal medical therapy after TURP in 1994.  Cystoscopy on 6/1/2022 demonstrated visually obstructing kissing lateral lobes prostatic regrowth otherwise normal bladder with moderate trabeculation.  Patient complains of lower urinary tract symptoms. He reports frequency, incomplete emptying, intermittency, nocturia two times a night, straining, urgency and weak stream. He denies nothing. Patient states symptoms are of severe severity. Onset of symptoms was a few years ago and was gradual in onset. His AUA Symptom Score is, 23/35.He reports a history of no complicating symptoms. He denies flank pain, gross hematuria, kidney stones and recurrent UTI.  Patient has tried Alpha blockers and 5 alpha reductase inhibitors without improvement.  Patient had TURP in 1994 with Dr. Dumont           The following portions of the patient's history were reviewed and updated as appropriate: allergies, current medications, past family history, past medical history, past social history, past surgical history and problem list.     Review of Systems   Constitutional: Negative for chills and fever.   Gastrointestinal: Negative for abdominal pain, anal bleeding and blood in stool.   Genitourinary: Negative for decreased urine volume, difficulty urinating, dysuria, enuresis, flank pain, frequency, genital sores, hematuria, penile discharge, penile pain, penile swelling, scrotal swelling, testicular pain and urgency.            Current Outpatient Medications:   •  ALPRAZolam (XANAX) 0.5 MG tablet, Take 1 tablet by mouth At Night As Needed for Anxiety., Disp: 30 tablet, Rfl: 5  •  aspirin (aspirin) 81 MG EC tablet, Take 81 mg by mouth Daily., Disp: , Rfl:   •  coenzyme Q10 100 MG  capsule, Take 200 mg by mouth Every Evening., Disp: , Rfl:   •  cyclobenzaprine (FLEXERIL) 10 MG tablet, Take 1 tablet by mouth 3 (Three) Times a Day As Needed for Muscle Spasms., Disp: 30 tablet, Rfl: 1  •  diclofenac (VOLTAREN) 1 % gel gel, Apply 4 g topically to the appropriate area as directed 4 (Four) Times a Day As Needed. 1-3 times daily, Disp: , Rfl:   •  esomeprazole (nexIUM) 40 MG capsule, Take 1 capsule by mouth once daily, Disp: 90 capsule, Rfl: 3  •  finasteride (PROSCAR) 5 MG tablet, Take 1 tablet by mouth Daily., Disp: 90 tablet, Rfl: 3  •  glipizide (Glucotrol XL) 2.5 MG 24 hr tablet, Take 1 tablet by mouth Daily., Disp: 30 tablet, Rfl: 11  •  glucose blood (Contour Test) test strip, 1 each by Other route Daily. Use as instructed, Disp: 100 each, Rfl: 3  •  ibuprofen (ADVIL,MOTRIN) 200 MG tablet, Take 200 mg by mouth Every 8 (Eight) Hours As Needed for Mild Pain ., Disp: , Rfl:   •  levothyroxine (SYNTHROID, LEVOTHROID) 50 MCG tablet, Take 1 tablet by mouth once daily, Disp: 90 tablet, Rfl: 1  •  metFORMIN ER (GLUCOPHAGE-XR) 750 MG 24 hr tablet, Take 1 tablet by mouth twice daily, Disp: 180 tablet, Rfl: 3  •  rosuvastatin (CRESTOR) 5 MG tablet, Take 1 tablet by mouth Every Other Day., Disp: 45 tablet, Rfl: 3  •  tamsulosin (FLOMAX) 0.4 MG capsule 24 hr capsule, Take two 0.4 mg tamsulosin daily (Patient taking differently: Take 2 capsules by mouth Daily. Take two 0.4 mg tamsulosin daily), Disp: 180 capsule, Rfl: 3  •  traMADol (ULTRAM) 50 MG tablet, Take 1 tablet by mouth Daily As Needed for Moderate Pain ., Disp: 30 tablet, Rfl: 0     Medical History   Past Medical History:   Diagnosis Date   • Diabetes mellitus (HCC)       Type 2   • Disease of thyroid gland     • GERD (gastroesophageal reflux disease)     • Hemorrhoids     • Hx of colonic polyps     • Hypercholesteremia              Surgical History         Past Surgical History:   Procedure Laterality Date   • CHOLECYSTECTOMY       • COLONOSCOPY    "01/17/2012     Colon polyp tissue not retrieved repeat exam in 5 years   • COLONOSCOPY   01/06/2009     adenomatous polyp x1   • COLONOSCOPY N/A 6/1/2017     Tubular adenoma Transvere colon, Diverticulosis repeat exam in 5 years   • ENDOSCOPY   06/25/2013     [facility]   • ENDOSCOPY N/A 1/29/2021     Multiple fundic gland polyps    • KNEE SURGERY       • SHOULDER SURGERY Right              Social History   Social History           Socioeconomic History   • Marital status:    Tobacco Use   • Smoking status: Former Smoker   • Smokeless tobacco: Never Used   Substance and Sexual Activity   • Alcohol use: No   • Drug use: No   • Sexual activity: Not Currently                  Family History   Problem Relation Age of Onset   • No Known Problems Mother     • No Known Problems Father     • Colon cancer Neg Hx     • Colon polyps Neg Hx           Objective     Temp 98.9 °F (37.2 °C)   Ht 193 cm (75.98\")   Wt 99.8 kg (220 lb)   BMI 26.79 kg/m²      Physical Exam  Vitals reviewed.   Constitutional:       Appearance: Normal appearance.   HENT:      Head: Normocephalic and atraumatic.      Nose: No congestion.   Pulmonary:      Effort: Pulmonary effort is normal.   Skin:     Coloration: Skin is not pale.   Neurological:      Mental Status: He is alert and oriented to person, place, and time.   Psychiatric:         Mood and Affect: Mood normal.         Behavior: Behavior normal.                        Results for orders placed or performed in visit on 05/18/22   POC Urinalysis Dipstick, Multipro     Specimen: Urine   Result Value Ref Range     Color Betsy Yellow, Straw, Dark Yellow, Betsy     Clarity, UA Clear Clear     Glucose,  mg/dL (A) Negative, 1000 mg/dL (3+) mg/dL     Bilirubin Negative Negative     Ketones, UA Negative Negative     Specific Gravity  1.015 1.005 - 1.030     Blood, UA Negative Negative     pH, Urine 5.5 5.0 - 8.0     Protein, POC Negative Negative mg/dL     Urobilinogen, UA Normal Normal     " Nitrite, UA Negative Negative     Leukocytes Negative Negative   IPSS Questionnaire (AUA-7):  Incomplete emptying  Over the past month, how often have you had a sensation of not emptying your bladder completely after you finish?: More than half the time (05/18/22 1324)  Frequency  Over the past month, how often have you had to urinate again less than two hours after you finishing urinating ?: About half the time (05/18/22 1324)  Intermittency  Over the past month, how often have you found you stopped and started again several time when you urinated ?: About half the time (05/18/22 1324)  Urgency  Over the last month, how difficult  have you found it to postpone urination ?: more than half the time (05/18/22 1324)  Weak Stream  Over the past month, how often have you had a weak urinary stream ?: Almost always (05/18/22 1324)  Straining  Over the past month, how often have you had to push or strain to begin urination ?: Less than half the time (05/18/22 1324)  Nocturia  Over the past month, how many times did you most typically get up to urinate from the time you went to bed until the time you got up in the morning ?: Less than half the time (05/18/22 1324)  Quality of life due to urinary symptoms  If you were to spend the rest of your life with your urinary condition the way it is now, how would feel about that?: Mixed - about equally satisfied (05/18/22 1324)     Scores  Total IPSS Score: 23 (05/18/22 1324)  Total Score = Symtomatic Level: severely symptomatic: 20-35 (05/18/22 1324)         Assessment and Plan     Diagnoses and all orders for this visit:     1. BPH with obstruction/lower urinary tract symptoms (Primary)  -     POC Urinalysis Dipstick, Multipro       Visual obstructing prostatic regrowth after TURP many years ago on maximal medical therapy with lifestyle limiting LUTS.  We discussed options for observation versus repeat TURP.  Patient would like to schedule transurethral resection of prostate for  next month.  We discussed risks of the procedure including but not limited to infection, bleeding, need for additional procedures, injury to urethra and/or bladder, failure to improve his symptoms, need for postoperative catheter, complications of anesthesia.  He voices understanding and provided informed consent to proceed with TURP today.  He will hold his aspirin a week prior.  Continue tamsulosin and finasteride.

## 2022-08-02 NOTE — ANESTHESIA PREPROCEDURE EVALUATION
Anesthesia Evaluation     Patient summary reviewed   no history of anesthetic complications:  NPO Solid Status: > 8 hours  NPO Liquid Status: > 2 hours           Airway   Mallampati: II  TM distance: >3 FB  Neck ROM: full  Dental    (+) lower dentures and upper dentures    Pulmonary    (-) COPD, asthma, sleep apnea, not a smoker  Cardiovascular   Exercise tolerance: good (4-7 METS)    (+) hypertension, hyperlipidemia,   (-) pacemaker, past MI, angina, cardiac stents      Neuro/Psych  (+) psychiatric history Depression,    (-) seizures, TIA, CVA  GI/Hepatic/Renal/Endo    (+)  GERD,  diabetes mellitus, thyroid problem hypothyroidism  (-) liver disease, no renal disease    Musculoskeletal     Abdominal    Substance History      OB/GYN          Other                          Anesthesia Plan    ASA 2     general     intravenous induction     Anesthetic plan, risks, benefits, and alternatives have been provided, discussed and informed consent has been obtained with: patient.        CODE STATUS:

## 2022-08-02 NOTE — OP NOTE
Operative Summary    Caleb RUBIO  Date of Procedure: 8/2/2022    Pre-op Diagnosis:   BPH with obstruction/lower urinary tract symptoms [N40.1, N13.8]    Post-op Diagnosis:     Post-Op Diagnosis Codes:     * BPH with obstruction/lower urinary tract symptoms [N40.1, N13.8]    Procedure/CPT® Codes:      Procedure(s):  CYSTOSCOPY TRANSURETHRAL RESECTION OF PROSTATE    Surgeon(s):  López Post MD    Anesthesia: General    Staff:   Circulator: Phylicia Crawford RN  Scrub Person: Allyson Zabalatany  Other: Jessica Mccormack    Indications for procedure:  81-year-old male with lifestyle limiting obstructive LUTS after previous TURP in 1994 noted to have obstructing prostatic regrowth on cystoscopy desiring to proceed today for repeat TURP    Findings:   Elevated bladder neck with lateral lobe prostatic hypertrophy regrowth taken down with TUIP followed by TURP    Procedure details:  After appropriate anesthesia, positioning, prep and drape, timeout protocol was observed.     The urethra is calibrated with Siddharth urethral sounds to 30 Estonian. There was no need to dilate the urethra based on this calibration for passage of the resectoscope sheath. At this point I passed the well lubricated [employer] Gyrus 27 Divehi continuous flow resectoscope with obturator into the bladder.  I did confirm the saline was to be hung as the irrigant so that I could perform a bipolar cautery procedure to reduce the risk of TUR syndrome with fluid overload and dilutional hyponatremia.    Using the 30° lens I inspected the urinary bladder.  The bladder was moderately trabeculated. The absence of diverticula and cellules were noted. No mucosal lesions were present. The ureteral orifices were Not identifiable due to significant trabeculation of the bladder but their approximate location was noted.  Prostatic urethra is inspected carefully. There was no evidence of a significant median lobe.  Bladder neck was noted to be elevated.  I first  performed transurethral incision of the prostate using the button electrode at 5 and 7:00 from the bladder neck to the verumontanum.  This dropped the bladder neck down nicely.  I then transition from the button electrode to the resecting loop electrode for transurethral resection of the prostate.  I first resected the adenoma at 6:00 from the bladder neck to the verumontanum.    I then turned my attention to the ventral aspect and lateral lobes.  The capsule is identified at 5 and then 7 o'clock anatomically and this resection is carried out to the level to the verumontanum but not beyond.  I used this as the landmark of the sphincter.  This set up the floor of the prostate nicely.  I resected from bladder neck out to the veru with care being taken not to undermine the bladder neck.  The lateral lobes were now nicely defined.  I resected the right lateral lobe initially from bladder neck out to the verumontanum in a clockwise fashion from the anatomic 7 o'clock to 10 o'clock.  I resected to the level of the capsule again using cautery to minimize bleeding.  This was followed by resecting the left lateral lobe in a counterclockwise fashion from 5 o'clock to 2 o'clock.  Again care was taken not to go beyond the verumontanum.  The majority of the time spent on this resection was at the lateral lobes.  At the conclusion of this the prostatic urethra did appear wide open.    Lastly I did resect some apical tissue at the bladder neck out to the midportion of the prostatic urethra.  I minimize resection at the level of the sphincter because it is less defined anteriorly.  At the conclusion of this I used an Quboleik evacuator as well as the scope with loop to remove any prostate chips.  Any active bleeding was noted and cauterized.  There were no obvious open sinuses.    At the conclusion I took a catheter guide with a 24 Bangladeshi 3-way Jacobo and manipulated this into the bladder.  Hand irrigation with saline is performed to  be sure there are no significant clots or remaining prostate chips.  I then connected the saline bladder irrigation to the tubing to initiate continuous bladder irrigation.  The balloon was filled with  30 mL of sterile water. The catheter was left to gravity drainage.     The patient was transferred from the operating room to the recovery room in stable condition.    Estimated Blood Loss: <30 mL    Specimens:                Specimens     ID Source Type Tests Collected By Collected At Frozen?    A Prostate Tissue · TISSUE PATHOLOGY EXAM   López Post MD 8/2/22 1126     Description: PROSTATE CHIPS            Drains:   Continuous Bladder Irrigation Triple-lumen 24 Fr (Active)       Complications: none    López Post MD     Date: 8/2/2022  Time: 14:16 CDT

## 2022-08-03 VITALS
TEMPERATURE: 98.6 F | SYSTOLIC BLOOD PRESSURE: 129 MMHG | DIASTOLIC BLOOD PRESSURE: 54 MMHG | BODY MASS INDEX: 25.83 KG/M2 | HEIGHT: 76 IN | HEART RATE: 64 BPM | OXYGEN SATURATION: 98 % | RESPIRATION RATE: 16 BRPM | WEIGHT: 212.08 LBS

## 2022-08-03 LAB — GLUCOSE BLDC GLUCOMTR-MCNC: 147 MG/DL (ref 70–130)

## 2022-08-03 PROCEDURE — A9270 NON-COVERED ITEM OR SERVICE: HCPCS | Performed by: UROLOGY

## 2022-08-03 PROCEDURE — 63710000001 PANTOPRAZOLE 40 MG TABLET DELAYED-RELEASE: Performed by: UROLOGY

## 2022-08-03 PROCEDURE — 63710000001 LEVOTHYROXINE 50 MCG TABLET: Performed by: UROLOGY

## 2022-08-03 PROCEDURE — 82962 GLUCOSE BLOOD TEST: CPT

## 2022-08-03 PROCEDURE — 63710000001 TAMSULOSIN 0.4 MG CAPSULE: Performed by: UROLOGY

## 2022-08-03 PROCEDURE — 63710000001 FINASTERIDE 5 MG TABLET: Performed by: UROLOGY

## 2022-08-03 PROCEDURE — 63710000001 SULFAMETHOXAZOLE-TRIMETHOPRIM 800-160 MG TABLET: Performed by: UROLOGY

## 2022-08-03 PROCEDURE — 99024 POSTOP FOLLOW-UP VISIT: CPT | Performed by: UROLOGY

## 2022-08-03 PROCEDURE — G0378 HOSPITAL OBSERVATION PER HR: HCPCS

## 2022-08-03 RX ORDER — PHENAZOPYRIDINE HYDROCHLORIDE 100 MG/1
100 TABLET, FILM COATED ORAL 3 TIMES DAILY PRN
Qty: 20 TABLET | Refills: 1 | Status: SHIPPED | OUTPATIENT
Start: 2022-08-03 | End: 2023-01-17

## 2022-08-03 RX ORDER — ONDANSETRON 4 MG/1
4 TABLET, ORALLY DISINTEGRATING ORAL EVERY 6 HOURS PRN
Qty: 6 TABLET | Refills: 1 | Status: SHIPPED | OUTPATIENT
Start: 2022-08-03 | End: 2023-01-17

## 2022-08-03 RX ORDER — OXYBUTYNIN CHLORIDE 5 MG/1
5 TABLET ORAL EVERY 8 HOURS PRN
Qty: 30 TABLET | Refills: 0 | Status: SHIPPED | OUTPATIENT
Start: 2022-08-03 | End: 2023-01-17

## 2022-08-03 RX ORDER — TRAMADOL HYDROCHLORIDE 50 MG/1
50 TABLET ORAL EVERY 8 HOURS PRN
Qty: 6 TABLET | Refills: 0 | Status: SHIPPED | OUTPATIENT
Start: 2022-08-03 | End: 2023-01-17 | Stop reason: SDUPTHER

## 2022-08-03 RX ORDER — DOCUSATE SODIUM 100 MG/1
100 CAPSULE, LIQUID FILLED ORAL 2 TIMES DAILY
Qty: 60 CAPSULE | Refills: 1 | Status: SHIPPED | OUTPATIENT
Start: 2022-08-03 | End: 2023-02-06

## 2022-08-03 RX ADMIN — LEVOTHYROXINE SODIUM 50 MCG: 50 TABLET ORAL at 05:55

## 2022-08-03 RX ADMIN — SULFAMETHOXAZOLE AND TRIMETHOPRIM 1 TABLET: 800; 160 TABLET ORAL at 09:02

## 2022-08-03 RX ADMIN — PANTOPRAZOLE SODIUM 40 MG: 40 TABLET, DELAYED RELEASE ORAL at 06:13

## 2022-08-03 RX ADMIN — FINASTERIDE 5 MG: 5 TABLET, FILM COATED ORAL at 09:02

## 2022-08-03 RX ADMIN — SODIUM CHLORIDE, POTASSIUM CHLORIDE, SODIUM LACTATE AND CALCIUM CHLORIDE 75 ML/HR: 600; 310; 30; 20 INJECTION, SOLUTION INTRAVENOUS at 06:14

## 2022-08-03 RX ADMIN — TAMSULOSIN HYDROCHLORIDE 0.8 MG: 0.4 CAPSULE ORAL at 09:02

## 2022-08-03 NOTE — PLAN OF CARE
Goal Outcome Evaluation:  Plan of Care Reviewed With: patient           Outcome Evaluation: Pt c/o pain once, PRN Tylenol given. No c/o nausea. Jacobo c/d/i. CBI going at slow rate, urine clear, yellow. IVF. Pt on room air. Alert and oriented. VSS.

## 2022-08-03 NOTE — DISCHARGE SUMMARY
Date of Discharge:  8/3/2022    Discharge Diagnosis:   #1.  BPH status post TURP    Presenting Problem/History of Present Illness  BPH with obstruction/lower urinary tract symptoms [N40.1, N13.8]       Hospital Course  Patient is a 81 y.o. male presented for transurethral resection of prostate on 8/3/2022.  He tolerated surgery well and recovered uneventfully in the regular floor.  He remained afebrile with stable vital signs.  On postoperative day 1, his urine remained clear with the CBI clamped.  His pain was controlled with oral analgesia.  He desired to go home.    Procedures Performed  Procedure(s):  CYSTOSCOPY TRANSURETHRAL RESECTION OF PROSTATE       Consults:   Consults     No orders found for last 30 day(s).          Condition on Discharge: Stable    Vital Signs  Temp:  [97.3 °F (36.3 °C)-98.9 °F (37.2 °C)] 98.6 °F (37 °C)  Heart Rate:  [48-68] 64  Resp:  [10-18] 16  BP: (116-171)/(52-77) 129/54      Discharge Disposition  Home or Self Care    Discharge Medications     Discharge Medications      New Medications      Instructions Start Date   docusate sodium 100 MG capsule  Commonly known as: Colace   100 mg, Oral, 2 Times Daily      ondansetron ODT 4 MG disintegrating tablet  Commonly known as: Zofran ODT   4 mg, Translingual, Every 6 Hours PRN      oxybutynin 5 MG tablet  Commonly known as: DITROPAN   5 mg, Oral, Every 8 Hours PRN      phenazopyridine 100 MG tablet  Commonly known as: PYRIDIUM   100 mg, Oral, 3 Times Daily PRN         Changes to Medications      Instructions Start Date   sulfamethoxazole-trimethoprim 800-160 MG per tablet  Commonly known as: BACTRIM DS,SEPTRA DS  What changed: additional instructions   1 tablet, Oral, 2 Times Daily      tamsulosin 0.4 MG capsule 24 hr capsule  Commonly known as: FLOMAX  What changed:   · how much to take  · how to take this  · when to take this  · additional instructions   Take two 0.4 mg tamsulosin daily      traMADol 50 MG tablet  Commonly known as:  ULTRAM  What changed: Another medication with the same name was added. Make sure you understand how and when to take each.   50 mg, Oral, Daily PRN      traMADol 50 MG tablet  Commonly known as: ULTRAM  What changed: You were already taking a medication with the same name, and this prescription was added. Make sure you understand how and when to take each.   50 mg, Oral, Every 8 Hours PRN         Continue These Medications      Instructions Start Date   acetaminophen 500 MG tablet  Commonly known as: TYLENOL   500 mg, Oral, Every 6 Hours PRN      ALPRAZolam 0.5 MG tablet  Commonly known as: XANAX   0.5 mg, Oral, Nightly PRN      aluminum hydroxide-mag carbonate 160-105 MG chewable tablet chewable tablet  Commonly known as: GAVISCON EXTRA RELIEF   Oral, Daily PRN      coenzyme Q10 100 MG capsule   200 mg, Oral, Every Evening      Contour Test test strip  Generic drug: glucose blood   1 each, Other, Daily, Use as instructed      cyclobenzaprine 10 MG tablet  Commonly known as: FLEXERIL   10 mg, Oral, 3 Times Daily PRN      diclofenac 1 % gel gel  Commonly known as: VOLTAREN   4 g, Topical, 4 Times Daily PRN, 1-3 times daily       esomeprazole 40 MG capsule  Commonly known as: nexIUM   Take 1 capsule by mouth once daily      finasteride 5 MG tablet  Commonly known as: PROSCAR   5 mg, Oral, Daily      glipizide 2.5 MG 24 hr tablet  Commonly known as: Glucotrol XL   2.5 mg, Oral, Daily      ibuprofen 200 MG tablet  Commonly known as: ADVIL,MOTRIN   200 mg, Oral, Every 8 Hours PRN      levothyroxine 50 MCG tablet  Commonly known as: SYNTHROID, LEVOTHROID   Take 1 tablet by mouth once daily      metFORMIN  MG 24 hr tablet  Commonly known as: GLUCOPHAGE-XR   750 mg, Oral, 2 Times Daily      rosuvastatin 5 MG tablet  Commonly known as: CRESTOR   5 mg, Oral, Every Other Day         Stop These Medications    aspirin 81 MG EC tablet            Discharge Diet:   Diet Instructions     Advance Diet as Tolerated             Activity at Discharge:   Activity Instructions     Discharge Activity      1) No driving for 3 days and until no longer taking narcotics.   2) May shower today.   3) Do not lift / push / pull more than 20 pounds for 2 weeks.  4) You may resume your aspirin in 7 days.          Follow-up Appointments  Future Appointments   Date Time Provider Department Center   10/27/2022  3:00 PM Ritu Dillon,  [facility]     Additional Instructions for the Follow-ups that You Need to Schedule     Discharge Follow-up with Specified Provider: Urology nurse visit on Monday 8/8 for fill and pull/voiding trial and f/u with Dr. Post in 6 weeks.   As directed      To: Urology nurse visit on Monday 8/8 for fill and pull/voiding trial and f/u with Dr. Post in 6 weeks.               Test Results Pending at Discharge  Pending Labs     Order Current Status    Tissue Pathology Exam In process           López Post MD  08/03/22  10:05 CDT    Time: Discharge 25 min

## 2022-08-04 ENCOUNTER — TELEPHONE (OUTPATIENT)
Dept: UROLOGY | Facility: CLINIC | Age: 82
End: 2022-08-04

## 2022-08-04 NOTE — TELEPHONE ENCOUNTER
Patient called stating his cath bag was leaking. I told him if he would like to come get another bag at the office that would be fine. He acknowledged understanding.

## 2022-08-08 ENCOUNTER — PROCEDURE VISIT (OUTPATIENT)
Dept: UROLOGY | Facility: CLINIC | Age: 82
End: 2022-08-08

## 2022-08-08 DIAGNOSIS — N40.1 BPH WITH OBSTRUCTION/LOWER URINARY TRACT SYMPTOMS: Primary | ICD-10-CM

## 2022-08-08 DIAGNOSIS — N13.8 BPH WITH OBSTRUCTION/LOWER URINARY TRACT SYMPTOMS: Primary | ICD-10-CM

## 2022-08-08 NOTE — PROGRESS NOTES
Patient of Dr. Post states he is here today to have his catheter removed. The patient denies any fever, chills or  N&V. Using the catheter in place and sterile water 220cc was installed into the bladder with no complications. Patient was able to urinate 250cc.   Patient advised to schedule his 4-6 weeks follow up with Dr Sincere Md and to call the office with any questions or concerns. The patient verbalized understanding. Romulo Gardner PA-c was in the office at the time of procedure.     Patient was advised to drink clear fluids for the next couple hours and urinate. he was advised he may experience some blood in the urine and burning with urination for the next couple days. If the patient is unable to urinate or develops fever, chills, N&V or suprapubic pain he will call to return for an appt at clinic or seek medical treatment at [facility] ER, PCP or Urgent Care after hours. Patient verbalized understanding and all questions were answered. Vani VERDUGO CMA    I have reviewed and agree with medical assistance documentation above

## 2022-08-09 ENCOUNTER — TELEPHONE (OUTPATIENT)
Dept: UROLOGY | Facility: CLINIC | Age: 82
End: 2022-08-09

## 2022-08-09 ENCOUNTER — TELEPHONE (OUTPATIENT)
Dept: INTERNAL MEDICINE | Facility: CLINIC | Age: 82
End: 2022-08-09

## 2022-08-09 NOTE — TELEPHONE ENCOUNTER
Spoke with nash and called pt back let him know to take benadryl and if this doesn't help to consult with primary care doctor.  Pt Understood.

## 2022-08-09 NOTE — TELEPHONE ENCOUNTER
Provider: DR WILKERSON  Caller: RANJAN RUBIO  Relationship to Patient: SELF  Pharmacy: [facility] [address] [place] [address] [phone] [facility] [phone]     Phone Number: [phone]  Reason for Call: PT STATES HE NOTICED SOME IRRITATION FROM THE TAPE ON HIS RIGHT LEG AFTER THE CATHETER WAS PLACED. SINCE HAVING THE CATHETER REMOVED 8/8/22, HE NOW HAS A RASH THAT EXTENDS FROM ABOUT 3 INCHES ABOVE THE KNEE TO HIS GROIN AND GENITAL AREA THAT ITCHES. HE STATES HE IS NOT SURE IF THIS IS A REACTION TO THE TAPE OR MEDICATION AND WOULD LIKE AN URGENT RETURN CALL.  When was the patient last seen: 8/8/22  When did it start: 8/8/22  Where is it located: RIGHT LEG  Characteristics of symptom/severity: SEVERE  Timing- Is it constant or intermittent: CONSTANT  What therapies/medications have you tried: CORTISONE, NOT HELPING

## 2022-08-09 NOTE — TELEPHONE ENCOUNTER
Caller: Caleb RUBIO    Relationship: Self    Best call back number: [phone]    What is the best time to reach you: SOON PLEASE     Who are you requesting to speak with (clinical staff, provider,  specific staff member):PROVIDER OR CLINICAL STAFF         What was the call regarding: PATIENT REQUESTING A CALL BACK TO DISCUSS HAVING A RASH SINCE YESTERDAY POSSIBLY A REACTION FROM THE TAPE TO HOLD THE CATHETER BAG FROM  A PROCEDURE HE HAD DONE YESTERDAY     Do you require a callback: YES

## 2022-08-09 NOTE — TELEPHONE ENCOUNTER
Patient had contacted urology as well and they have already told him to take Benadryl and contact PCP if not improved.

## 2022-08-11 ENCOUNTER — OFFICE VISIT (OUTPATIENT)
Dept: INTERNAL MEDICINE | Facility: CLINIC | Age: 82
End: 2022-08-11

## 2022-08-11 VITALS
BODY MASS INDEX: 25.57 KG/M2 | DIASTOLIC BLOOD PRESSURE: 70 MMHG | TEMPERATURE: 97.1 F | OXYGEN SATURATION: 97 % | SYSTOLIC BLOOD PRESSURE: 130 MMHG | WEIGHT: 210 LBS | HEIGHT: 76 IN | HEART RATE: 67 BPM

## 2022-08-11 DIAGNOSIS — N52.9 ERECTILE DYSFUNCTION, UNSPECIFIED ERECTILE DYSFUNCTION TYPE: ICD-10-CM

## 2022-08-11 DIAGNOSIS — L50.9 URTICARIA: Primary | ICD-10-CM

## 2022-08-11 PROCEDURE — 99214 OFFICE O/P EST MOD 30 MIN: CPT | Performed by: FAMILY MEDICINE

## 2022-08-11 RX ORDER — PREDNISONE 10 MG/1
TABLET ORAL
Qty: 30 TABLET | Refills: 0 | Status: SHIPPED | OUTPATIENT
Start: 2022-08-11 | End: 2022-12-05

## 2022-08-11 NOTE — PROGRESS NOTES
Subjective     Chief Complaint   Patient presents with   • Rash     Right leg up into groin area. Pt had a catheter after a surgery on 08/03/2022 with Dr. Post that was taped down and he wonder if this is what started his issue.    He did us cortisone 10, then went to benadryl cream and this helped more, then started benadryl oral and that was better but still having issues.  Pt states his right arm has also started itching        History of Present Illness    The patient presents today for evaluation of a rash.    He states the rash is occasionally mildly painful and is pruritic. He notes the rash has spread to his genitalia and states this has a similar appearance to the rash on his right thigh and groin region. The patient reports that the rash more severely affects his leg than his genitalia. He applied topical Cortizone-10 to his rash, which was not helpful. Dr Post office recommended he utilize Benadryl, so he began applying Benadryl cream , which has only partially alleviated his pruritus.  He has also used oral benadryl to help with the itching. He states he awakened last night, 08/10/2022, and had been scratching in his sleep. The patient confirms that he has taken oral steroids in the past and has tolerated this aside from experiencing disrupted sleep. He suspects adhesive tape previously applied to his right leg may have contributed to his rash. He has had shingles in the past. The patient states he has had 1 shingles vaccination and has not had the second shingles vaccination yet. The patient asks if he should continue utilizing any topical medication for his rash.    He reports that Dr. Post prescribed him sulfa after bacteria was found in his urine. He continues to take this medication and inquires about the safety of taking this along with oral steroids.    The patient inquires about the safety of taking medication for erectile dysfunction, given his current age and health status. He denies  taking nitroglycerin. He is pursuing a relationship with his high school sweetheart.    Patient also talks about erectile dysfunction.  He is now seeing his high school sweetheart he is wondering if there is anything that will help him get an erection if he wants to have conjugal activity with her.  Patient's PMR from outside medical facility reviewed and noted.    Review of Systems     Otherwise complete ROS reviewed and negative except as mentioned in the HPI.    Past Medical History:   Past Medical History:   Diagnosis Date   • Diabetes mellitus (HCC)     Type 2   • Disease of thyroid gland    • GERD (gastroesophageal reflux disease)    • Hemorrhoids    • Hx of colonic polyps    • Hypercholesteremia      Past Surgical History:  Past Surgical History:   Procedure Laterality Date   • CATARACT EXTRACTION Bilateral    • CHOLECYSTECTOMY     • COLONOSCOPY  01/17/2012    Colon polyp tissue not retrieved repeat exam in 5 years   • COLONOSCOPY  01/06/2009    adenomatous polyp x1   • COLONOSCOPY N/A 06/01/2017    Tubular adenoma Transvere colon, Diverticulosis repeat exam in 5 years   • COLONOSCOPY N/A 07/20/2022    Procedure: COLONOSCOPY WITH ANESTHESIA;  Surgeon: Damien Odonnell MD;  Location: [facility] ENDOSCOPY;  Service: Gastroenterology;  Laterality: N/A;  pre hx polyps  post polyps;suboptimal prep     • CYSTOSCOPY TRANSURETHRAL RESECTION OF PROSTATE N/A 8/2/2022    Procedure: CYSTOSCOPY TRANSURETHRAL RESECTION OF PROSTATE;  Surgeon: López Post MD;  Location: [facility] OR;  Service: Urology;  Laterality: N/A;   • ENDOSCOPY  06/25/2013    [facility]   • ENDOSCOPY N/A 01/29/2021    Multiple fundic gland polyps    • KNEE SURGERY     • SHOULDER SURGERY Right    • TONSILLECTOMY       Social History:  reports that he quit smoking about 48 years ago. His smoking use included cigarettes. He has never used smokeless tobacco. He reports that he does not drink alcohol and does not use drugs.    Family History: family history  includes No Known Problems in his father and mother.      Allergies:  Allergies   Allergen Reactions   • Aleve [Naproxen] Hallucinations     Medications:  Prior to Admission medications    Medication Sig Start Date End Date Taking? Authorizing Provider   acetaminophen (TYLENOL) 500 MG tablet Take 500 mg by mouth Every 6 (Six) Hours As Needed for Mild Pain .   Yes Farrukh Farias MD   ALPRAZolam (XANAX) 0.5 MG tablet Take 1 tablet by mouth At Night As Needed for Anxiety. 4/18/22  Yes Ritu Dillon DO   aluminum hydroxide-mag carbonate (GAVISCON EXTRA RELIEF) 160-105 MG chewable tablet chewable tablet Chew Daily As Needed.   Yes Farrukh Farias MD   coenzyme Q10 100 MG capsule Take 200 mg by mouth Every Evening.   Yes Farrukh Farias MD   cyclobenzaprine (FLEXERIL) 10 MG tablet Take 1 tablet by mouth 3 (Three) Times a Day As Needed for Muscle Spasms. 11/4/21  Yes Darshan Garcias MD   diclofenac (VOLTAREN) 1 % gel gel Apply 4 g topically to the appropriate area as directed 4 (Four) Times a Day As Needed. 1-3 times daily   Yes Farrukh Farias MD   docusate sodium (Colace) 100 MG capsule Take 1 capsule by mouth 2 (Two) Times a Day. 8/3/22  Yes López Post MD   esomeprazole (nexIUM) 40 MG capsule Take 1 capsule by mouth once daily 3/22/22  Yes Deepti Brito APRN   finasteride (PROSCAR) 5 MG tablet Take 1 tablet by mouth Daily. 2/22/21  Yes López Post MD   glipizide (Glucotrol XL) 2.5 MG 24 hr tablet Take 1 tablet by mouth Daily. 1/31/22  Yes Darshan Garcias MD   glucose blood (Contour Test) test strip 1 each by Other route Daily. Use as instructed 2/10/22  Yes Latia Gonzalez APRN   ibuprofen (ADVIL,MOTRIN) 200 MG tablet Take 200 mg by mouth Every 8 (Eight) Hours As Needed for Mild Pain .   Yes Farrukh Farias MD   levothyroxine (SYNTHROID, LEVOTHROID) 50 MCG tablet Take 1 tablet by mouth once daily 3/1/22  Yes Aurelia Alvarenga APRN   metFORMIN ER  "(GLUCOPHAGE-XR) 750 MG 24 hr tablet Take 1 tablet by mouth 2 (Two) Times a Day. 6/16/22  Yes Ritu Dillon DO   ondansetron ODT (Zofran ODT) 4 MG disintegrating tablet Place 1 tablet on the tongue Every 6 (Six) Hours As Needed for Nausea. 8/3/22  Yes López Post MD   oxybutynin (DITROPAN) 5 MG tablet Take 1 tablet by mouth Every 8 (Eight) Hours As Needed (bladder spasms). 8/3/22  Yes López Post MD   phenazopyridine (PYRIDIUM) 100 MG tablet Take 1 tablet by mouth 3 (Three) Times a Day As Needed (urinary burning). 8/3/22  Yes López Post MD   rosuvastatin (CRESTOR) 5 MG tablet Take 1 tablet by mouth Every Other Day. 10/12/21  Yes Darshan Garcias MD   sulfamethoxazole-trimethoprim (BACTRIM DS,SEPTRA DS) 800-160 MG per tablet Take 1 tablet by mouth 2 (Two) Times a Day for 14 days.  Patient taking differently: Take 1 tablet by mouth 2 (Two) Times a Day. Started Sunday for uti per pt 7/31/22 8/14/22 Yes López Post MD   tamsulosin (FLOMAX) 0.4 MG capsule 24 hr capsule Take two 0.4 mg tamsulosin daily  Patient taking differently: Take 2 capsules by mouth Daily. 3/18/22  Yes Romulo Gardner PA   traMADol (ULTRAM) 50 MG tablet Take 1 tablet by mouth Daily As Needed for Moderate Pain . 11/4/21  Yes Darshan Garcias MD   traMADol (ULTRAM) 50 MG tablet Take 1 tablet by mouth Every 8 (Eight) Hours As Needed for Severe Pain  (postop pain). 8/3/22  Yes López Post MD       Objective     Vital Signs: /70 (BP Location: Left arm, Patient Position: Sitting, Cuff Size: Adult)   Pulse 67   Temp 97.1 °F (36.2 °C) (Temporal)   Ht 193 cm (76\")   Wt 95.3 kg (210 lb)   SpO2 97%   BMI 25.56 kg/m²   Physical Exam  Vitals and nursing note reviewed.   Constitutional:       Appearance: Normal appearance.   HENT:      Head: Normocephalic and atraumatic.      Right Ear: External ear normal.      Left Ear: External ear normal.      Nose: Nose normal.      Mouth/Throat:      Mouth: Mucous " membranes are moist.   Eyes:      Extraocular Movements: Extraocular movements intact.      Conjunctiva/sclera: Conjunctivae normal.      Pupils: Pupils are equal, round, and reactive to light.   Cardiovascular:      Rate and Rhythm: Normal rate and regular rhythm.      Pulses: Normal pulses.      Heart sounds: No murmur heard.    No friction rub. No gallop.   Pulmonary:      Effort: Pulmonary effort is normal.      Breath sounds: Normal breath sounds.   Abdominal:      General: Bowel sounds are normal.      Palpations: Abdomen is soft.   Musculoskeletal:         General: Normal range of motion.      Cervical back: Normal range of motion and neck supple.   Skin:     General: Skin is warm and dry.      Capillary Refill: Capillary refill takes less than 2 seconds.      Comments: The patient has an erythematous urticarial rash across his right upper thigh, into his groin and toward midline. The rash blanches and is not hot. There is no evidence of infection.   Neurological:      General: No focal deficit present.      Mental Status: He is alert and oriented to person, place, and time.      Cranial Nerves: No cranial nerve deficit.   Psychiatric:         Mood and Affect: Mood normal.         Behavior: Behavior normal.               Results Reviewed:  Glucose   Date Value Ref Range Status   07/29/2022 185 (H) 65 - 99 mg/dL Final     BUN   Date Value Ref Range Status   07/29/2022 21 8 - 23 mg/dL Final     Creatinine   Date Value Ref Range Status   07/29/2022 1.07 0.76 - 1.27 mg/dL Final     Sodium   Date Value Ref Range Status   07/29/2022 143 136 - 145 mmol/L Final     Potassium   Date Value Ref Range Status   07/29/2022 3.9 3.5 - 5.2 mmol/L Final     Chloride   Date Value Ref Range Status   07/29/2022 106 98 - 107 mmol/L Final     CO2   Date Value Ref Range Status   07/29/2022 28.0 22.0 - 29.0 mmol/L Final     Calcium   Date Value Ref Range Status   07/29/2022 8.9 8.6 - 10.5 mg/dL Final     ALT (SGPT)   Date Value Ref  Range Status   04/08/2022 18 1 - 41 U/L Final     AST (SGOT)   Date Value Ref Range Status   04/08/2022 15 1 - 40 U/L Final     WBC   Date Value Ref Range Status   07/29/2022 5.27 3.40 - 10.80 10*3/mm3 Final   04/08/2022 5.66 3.40 - 10.80 10*3/mm3 Final     Hematocrit   Date Value Ref Range Status   07/29/2022 37.9 37.5 - 51.0 % Final     Platelets   Date Value Ref Range Status   07/29/2022 199 140 - 450 10*3/mm3 Final     Triglycerides   Date Value Ref Range Status   04/08/2022 190 (H) 0 - 150 mg/dL Final     HDL Cholesterol   Date Value Ref Range Status   04/08/2022 37 (L) 40 - 60 mg/dL Final     LDL Chol Calc (NIH)   Date Value Ref Range Status   04/08/2022 97 0 - 100 mg/dL Final     Hemoglobin A1C   Date Value Ref Range Status   04/08/2022 6.90 (H) 4.80 - 5.60 % Final     Comment:     Hemoglobin A1C Ranges:  Increased Risk for Diabetes  5.7% to 6.4%  Diabetes                     >= 6.5%  Diabetic Goal                < 7.0%     10/12/2021 6.3 % Final         Assessment / Plan     Assessment/Plan:  1. Urticaria      2. Erectile dysfunction, unspecified erectile dysfunction type          Plan  The patient will be placed on an oral steroid taper dose, utilizing prednisone 10 mg tablets, and tapering instructions were explained. He was encouraged to take Benadryl at night, at least. He was advised he may use Zyrtec as needed during the daytime. The patient was informed that he may continue the medication prescribed by Dr. Post and may discontinue use of topical creams for his rash. The patient also inquired during his visit about medication to help with erectile dysfunction. He was assured that there are medications available that would be appropriate for him to use. He was advised that when he and his high school sweetheart are contemplating having conjugal activity, he should contact the office, and medication will be prescribed.      Return if symptoms worsen or fail to improve. unless patient needs to be seen  sooner or acute issues arise.      I have discussed the patient results/orders and and plan/recommendation with them at today's visit.      Ritu Dillon DO   08/11/2022      Transcribed from ambient dictation for Ritu Dillon DO by ALBERTA MEMBRENO.  08/11/22   16:29 CDT    Patient verbalized consent to the visit recording.

## 2022-08-19 ENCOUNTER — TELEPHONE (OUTPATIENT)
Dept: UROLOGY | Facility: CLINIC | Age: 82
End: 2022-08-19

## 2022-08-19 NOTE — TELEPHONE ENCOUNTER
Caller: RANJAN RUBIO  Relationship: SELF    Best call back number: [phone]    What medications are you currently taking:   Current Outpatient Medications on File Prior to Visit   Medication Sig Dispense Refill   • acetaminophen (TYLENOL) 500 MG tablet Take 500 mg by mouth Every 6 (Six) Hours As Needed for Mild Pain .     • ALPRAZolam (XANAX) 0.5 MG tablet Take 1 tablet by mouth At Night As Needed for Anxiety. 30 tablet 5   • aluminum hydroxide-mag carbonate (GAVISCON EXTRA RELIEF) 160-105 MG chewable tablet chewable tablet Chew Daily As Needed.     • coenzyme Q10 100 MG capsule Take 200 mg by mouth Every Evening.     • cyclobenzaprine (FLEXERIL) 10 MG tablet Take 1 tablet by mouth 3 (Three) Times a Day As Needed for Muscle Spasms. 30 tablet 1   • diclofenac (VOLTAREN) 1 % gel gel Apply 4 g topically to the appropriate area as directed 4 (Four) Times a Day As Needed. 1-3 times daily     • docusate sodium (Colace) 100 MG capsule Take 1 capsule by mouth 2 (Two) Times a Day. 60 capsule 1   • esomeprazole (nexIUM) 40 MG capsule Take 1 capsule by mouth once daily 90 capsule 3   • finasteride (PROSCAR) 5 MG tablet Take 1 tablet by mouth Daily. 90 tablet 3   • glipizide (Glucotrol XL) 2.5 MG 24 hr tablet Take 1 tablet by mouth Daily. 30 tablet 11   • glucose blood (Contour Test) test strip 1 each by Other route Daily. Use as instructed 100 each 3   • ibuprofen (ADVIL,MOTRIN) 200 MG tablet Take 200 mg by mouth Every 8 (Eight) Hours As Needed for Mild Pain .     • levothyroxine (SYNTHROID, LEVOTHROID) 50 MCG tablet Take 1 tablet by mouth once daily 90 tablet 1   • metFORMIN ER (GLUCOPHAGE-XR) 750 MG 24 hr tablet Take 1 tablet by mouth 2 (Two) Times a Day. 180 tablet 3   • ondansetron ODT (Zofran ODT) 4 MG disintegrating tablet Place 1 tablet on the tongue Every 6 (Six) Hours As Needed for Nausea. 6 tablet 1   • oxybutynin (DITROPAN) 5 MG tablet Take 1 tablet by mouth Every 8 (Eight) Hours As Needed (bladder spasms). 30  tablet 0   • phenazopyridine (PYRIDIUM) 100 MG tablet Take 1 tablet by mouth 3 (Three) Times a Day As Needed (urinary burning). 20 tablet 1   • predniSONE (DELTASONE) 10 MG tablet Take 4 daily x 3 then 3 daily x 3 then 2 daily x 3 then 1 daily x 3 then stop 30 tablet 0   • rosuvastatin (CRESTOR) 5 MG tablet Take 1 tablet by mouth Every Other Day. 45 tablet 3   • tamsulosin (FLOMAX) 0.4 MG capsule 24 hr capsule Take two 0.4 mg tamsulosin daily (Patient taking differently: Take 2 capsules by mouth Daily.) 180 capsule 3   • traMADol (ULTRAM) 50 MG tablet Take 1 tablet by mouth Daily As Needed for Moderate Pain . 30 tablet 0   • traMADol (ULTRAM) 50 MG tablet Take 1 tablet by mouth Every 8 (Eight) Hours As Needed for Severe Pain  (postop pain). 6 tablet 0     No current facility-administered medications on file prior to visit.          When did you start taking these medications: UNSURE    Which medication are you concerned about: TAMSULOSIN & FINASTRIDE    Who prescribed you this medication: WILKERSON AND VELÁZ[facility]    What are your concerns: PT STATES THE PROVIDERS PRESCRIBED THE TWO MEDS BEFORE HIS PROCEDURE AND NOW THAT HE'S HAD IT, QUEZADA SHE STILL NEED TO TAKE THE MEDS. IF SO, HE IS OUT OF REFILLS SO NEEDS MORE SENT [facility] IN [facility]. PLEASE CALL HIM ASA TO ADVISE.   How long have you had these concerns: TODAY

## 2022-08-24 DIAGNOSIS — N13.8 BPH WITH OBSTRUCTION/LOWER URINARY TRACT SYMPTOMS: ICD-10-CM

## 2022-08-24 DIAGNOSIS — N40.1 BPH WITH OBSTRUCTION/LOWER URINARY TRACT SYMPTOMS: ICD-10-CM

## 2022-08-24 RX ORDER — FINASTERIDE 5 MG/1
5 TABLET, FILM COATED ORAL DAILY
Qty: 90 TABLET | Refills: 3 | Status: SHIPPED | OUTPATIENT
Start: 2022-08-24 | End: 2023-01-17

## 2022-08-29 ENCOUNTER — TELEPHONE (OUTPATIENT)
Dept: UROLOGY | Facility: CLINIC | Age: 82
End: 2022-08-29

## 2022-08-29 NOTE — TELEPHONE ENCOUNTER
Called pt back and answered all of his questions he wanted to make sure seeing a little bit of urine was normal from him

## 2022-08-29 NOTE — TELEPHONE ENCOUNTER
Caller:  RANJAN RUBIO    Relationship: SELF     Best call back number: [phone]    What is your medical concern? PT STATES HE HAS SOME MEDICAL CONCERNS HE WANTS TO SPEAK WITH DR. WILKERSON OR HIS NURSE REGARDING. DID NOT PROVIDE DETAILS, EVA.

## 2022-09-06 ENCOUNTER — TELEPHONE (OUTPATIENT)
Dept: INTERNAL MEDICINE | Facility: CLINIC | Age: 82
End: 2022-09-06

## 2022-09-06 RX ORDER — LEVOTHYROXINE SODIUM 0.05 MG/1
50 TABLET ORAL DAILY
Qty: 90 TABLET | Refills: 1 | Status: SHIPPED | OUTPATIENT
Start: 2022-09-06 | End: 2023-03-07

## 2022-09-06 NOTE — TELEPHONE ENCOUNTER
Needs refill of Synthroid 50 mcg daily.  Patient is totally out.  [facility] requested it late last week but I don't see it in any box.

## 2022-09-06 NOTE — TELEPHONE ENCOUNTER
[facility]  Will send in today.

## 2022-09-08 NOTE — PROGRESS NOTES
Subjective    Mr. RUBIO is 82 y.o. male    Chief Complaint: BPH    History of Present Illness  82-year-old male follow-up status post TURP on 8/2/2022.  His prostate chip pathology was benign.  He is voiding with a much stronger stream and is overall pleased.  This was his second TURP.  He is also having new problem with difficulty obtaining and maintaining erections.  He states he has not had an erection for over 7 years.    The following portions of the patient's history were reviewed and updated as appropriate: allergies, current medications, past family history, past medical history, past social history, past surgical history and problem list.    Review of Systems      Current Outpatient Medications:   •  acetaminophen (TYLENOL) 500 MG tablet, Take 500 mg by mouth Every 6 (Six) Hours As Needed for Mild Pain ., Disp: , Rfl:   •  ALPRAZolam (XANAX) 0.5 MG tablet, Take 1 tablet by mouth At Night As Needed for Anxiety., Disp: 30 tablet, Rfl: 5  •  aluminum hydroxide-mag carbonate (GAVISCON EXTRA RELIEF) 160-105 MG chewable tablet chewable tablet, Chew Daily As Needed., Disp: , Rfl:   •  coenzyme Q10 100 MG capsule, Take 200 mg by mouth Every Evening., Disp: , Rfl:   •  cyclobenzaprine (FLEXERIL) 10 MG tablet, Take 1 tablet by mouth 3 (Three) Times a Day As Needed for Muscle Spasms., Disp: 30 tablet, Rfl: 1  •  diclofenac (VOLTAREN) 1 % gel gel, Apply 4 g topically to the appropriate area as directed 4 (Four) Times a Day As Needed. 1-3 times daily, Disp: , Rfl:   •  docusate sodium (Colace) 100 MG capsule, Take 1 capsule by mouth 2 (Two) Times a Day., Disp: 60 capsule, Rfl: 1  •  esomeprazole (nexIUM) 40 MG capsule, Take 1 capsule by mouth once daily, Disp: 90 capsule, Rfl: 3  •  finasteride (PROSCAR) 5 MG tablet, Take 1 tablet by mouth Daily., Disp: 90 tablet, Rfl: 3  •  glipizide (Glucotrol XL) 2.5 MG 24 hr tablet, Take 1 tablet by mouth Daily., Disp: 30 tablet, Rfl: 11  •  glucose blood (Contour Test) test  strip, 1 each by Other route Daily. Use as instructed, Disp: 100 each, Rfl: 3  •  ibuprofen (ADVIL,MOTRIN) 200 MG tablet, Take 200 mg by mouth Every 8 (Eight) Hours As Needed for Mild Pain ., Disp: , Rfl:   •  levothyroxine (SYNTHROID, LEVOTHROID) 50 MCG tablet, Take 1 tablet by mouth Daily., Disp: 90 tablet, Rfl: 1  •  metFORMIN ER (GLUCOPHAGE-XR) 750 MG 24 hr tablet, Take 1 tablet by mouth 2 (Two) Times a Day., Disp: 180 tablet, Rfl: 3  •  ondansetron ODT (Zofran ODT) 4 MG disintegrating tablet, Place 1 tablet on the tongue Every 6 (Six) Hours As Needed for Nausea., Disp: 6 tablet, Rfl: 1  •  oxybutynin (DITROPAN) 5 MG tablet, Take 1 tablet by mouth Every 8 (Eight) Hours As Needed (bladder spasms)., Disp: 30 tablet, Rfl: 0  •  phenazopyridine (PYRIDIUM) 100 MG tablet, Take 1 tablet by mouth 3 (Three) Times a Day As Needed (urinary burning)., Disp: 20 tablet, Rfl: 1  •  predniSONE (DELTASONE) 10 MG tablet, Take 4 daily x 3 then 3 daily x 3 then 2 daily x 3 then 1 daily x 3 then stop, Disp: 30 tablet, Rfl: 0  •  rosuvastatin (CRESTOR) 5 MG tablet, Take 1 tablet by mouth Every Other Day., Disp: 45 tablet, Rfl: 3  •  tamsulosin (FLOMAX) 0.4 MG capsule 24 hr capsule, Take two 0.4 mg tamsulosin daily (Patient taking differently: Take 2 capsules by mouth Daily.), Disp: 180 capsule, Rfl: 3  •  traMADol (ULTRAM) 50 MG tablet, Take 1 tablet by mouth Daily As Needed for Moderate Pain ., Disp: 30 tablet, Rfl: 0  •  traMADol (ULTRAM) 50 MG tablet, Take 1 tablet by mouth Every 8 (Eight) Hours As Needed for Severe Pain  (postop pain)., Disp: 6 tablet, Rfl: 0  •  tadalafil (Cialis) 20 MG tablet, 1/2 to 1 tab by mouth 1 hour prior to intercourse, Disp: 30 tablet, Rfl: 11    Past Medical History:   Diagnosis Date   • Diabetes mellitus (HCC)     Type 2   • Disease of thyroid gland    • GERD (gastroesophageal reflux disease)    • Hemorrhoids    • Hx of colonic polyps    • Hypercholesteremia        Past Surgical History:   Procedure  "Laterality Date   • CATARACT EXTRACTION Bilateral    • CHOLECYSTECTOMY     • COLONOSCOPY  2012    Colon polyp tissue not retrieved repeat exam in 5 years   • COLONOSCOPY  2009    adenomatous polyp x1   • COLONOSCOPY N/A 2017    Tubular adenoma Transvere colon, Diverticulosis repeat exam in 5 years   • COLONOSCOPY N/A 2022    Procedure: COLONOSCOPY WITH ANESTHESIA;  Surgeon: Damien Odonnell MD;  Location:  [facility];  Service: Gastroenterology;  Laterality: N/A;  pre hx polyps  post polyps;suboptimal prep     • CYSTOSCOPY TRANSURETHRAL RESECTION OF PROSTATE N/A 2022    Procedure: CYSTOSCOPY TRANSURETHRAL RESECTION OF PROSTATE;  Surgeon: López Post MD;  [facility];  Service: Urology;  Laterality: N/A;   • ENDOSCOPY  2013    [facility]   • ENDOSCOPY N/A 2021    Multiple fundic gland polyps    • KNEE SURGERY     • SHOULDER SURGERY Right    • TONSILLECTOMY         Social History     Socioeconomic History   • Marital status:    Tobacco Use   • Smoking status: Former Smoker     Types: Cigarettes     Quit date:      Years since quittin.7   • Smokeless tobacco: Never Used   Vaping Use   • Vaping Use: Never used   Substance and Sexual Activity   • Alcohol use: No   • Drug use: No   • Sexual activity: Not Currently       Family History   Problem Relation Age of Onset   • No Known Problems Mother    • No Known Problems Father    • Colon cancer Neg Hx    • Colon polyps Neg Hx        Objective    Temp 96.9 °F (36.1 °C)   Ht 193 cm (76\")   Wt 97.8 kg (215 lb 9.6 oz)   BMI 26.24 kg/m²     Physical Exam        Results for orders placed or performed in visit on 22   POC Urinalysis Dipstick, Multipro    Specimen: Urine   Result Value Ref Range    Color Yellow Yellow, Straw, Dark Yellow, Betsy    Clarity, UA Clear Clear    Glucose, UA >=1000 mg/dL (3+) (A) Negative mg/dL    Bilirubin Negative Negative    Ketones, UA Negative Negative    Specific " Gravity  1.020 1.005 - 1.030    Blood, UA Large (A) Negative    pH, Urine 5.5 5.0 - 8.0    Protein, POC Trace (A) Negative mg/dL    Urobilinogen, UA 1 E.U./dL  (A) Normal, 0.2 E.U./dL    Nitrite, UA Negative Negative    Leukocytes Small (1+) (A) Negative     IPSS Questionnaire (AUA-7):  Incomplete emptying  Over the past month, how often have you had a sensation of not emptying your bladder completely after you finish?: Not at all (09/14/22 0845)  Frequency  Over the past month, how often have you had to urinate again less than two hours after you finishing urinating ?: Less than 1 time in 5 (09/14/22 0845)  Intermittency  Over the past month, how often have you found you stopped and started again several time when you urinated ?: Not at all (09/14/22 0845)  Urgency  Over the last month, how difficult  have you found it to postpone urination ?: Not at all (09/14/22 0845)  Weak Stream  Over the past month, how often have you had a weak urinary stream ?: Less than 1 time in 5 (09/14/22 0845)  Straining  Over the past month, how often have you had to push or strain to begin urination ?: Not at all (09/14/22 0845)  Nocturia  Over the past month, how many times did you most typically get up to urinate from the time you went to bed until the time you got up in the morning ?: Less than 1 time in 5 (09/14/22 0845)  Quality of life due to urinary symptoms  If you were to spend the rest of your life with your urinary condition the way it is now, how would feel about that?: Delighted (09/14/22 0845)    Scores  Total IPSS Score: (!) 3 (09/14/22 0845)  Total Score = Symtomatic Level: Mildly symptomatic: 0-7 (09/14/22 0845)        Assessment and Plan    Diagnoses and all orders for this visit:    1. BPH with obstruction/lower urinary tract symptoms (Primary)  -     POC Urinalysis Dipstick, Multipro    2. 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  He may stop his alpha-blocker and finasteride.  We discussed his overactive bladder should hopefully prove over the next several months.  I recommended a trial of PDE inhibitor therapy for his ED.  He understands no nitrates.  He will follow-up with me in 2-3 months.      This document has been signed by ЕЛЕНА Post MD on September 14, 2022 14:59 CDT

## 2022-09-14 ENCOUNTER — OFFICE VISIT (OUTPATIENT)
Dept: UROLOGY | Facility: CLINIC | Age: 82
End: 2022-09-14

## 2022-09-14 VITALS — HEIGHT: 76 IN | BODY MASS INDEX: 26.25 KG/M2 | TEMPERATURE: 96.9 F | WEIGHT: 215.6 LBS

## 2022-09-14 DIAGNOSIS — N40.1 BPH WITH OBSTRUCTION/LOWER URINARY TRACT SYMPTOMS: Primary | ICD-10-CM

## 2022-09-14 DIAGNOSIS — N52.01 ERECTILE DYSFUNCTION DUE TO ARTERIAL INSUFFICIENCY: ICD-10-CM

## 2022-09-14 DIAGNOSIS — N13.8 BPH WITH OBSTRUCTION/LOWER URINARY TRACT SYMPTOMS: Primary | ICD-10-CM

## 2022-09-14 LAB
BILIRUB BLD-MCNC: NEGATIVE MG/DL
CLARITY, POC: CLEAR
COLOR UR: YELLOW
GLUCOSE UR STRIP-MCNC: ABNORMAL MG/DL
KETONES UR QL: NEGATIVE
LEUKOCYTE EST, POC: ABNORMAL
NITRITE UR-MCNC: NEGATIVE MG/ML
PH UR: 5.5 [PH] (ref 5–8)
PROT UR STRIP-MCNC: ABNORMAL MG/DL
RBC # UR STRIP: ABNORMAL /UL
SP GR UR: 1.02 (ref 1–1.03)
UROBILINOGEN UR QL: ABNORMAL

## 2022-09-14 PROCEDURE — 99024 POSTOP FOLLOW-UP VISIT: CPT | Performed by: UROLOGY

## 2022-09-14 PROCEDURE — 81001 URINALYSIS AUTO W/SCOPE: CPT | Performed by: UROLOGY

## 2022-09-14 RX ORDER — TADALAFIL 20 MG/1
TABLET ORAL
Qty: 30 TABLET | Refills: 11 | Status: SHIPPED | OUTPATIENT
Start: 2022-09-14 | End: 2023-01-17

## 2022-09-19 ENCOUNTER — TELEPHONE (OUTPATIENT)
Dept: FAMILY MEDICINE CLINIC | Facility: CLINIC | Age: 82
End: 2022-09-19

## 2022-09-19 NOTE — TELEPHONE ENCOUNTER
Called PT to reschedule upcoming appt with Dr. Dillon on 10/20 to [facility] schedule on the same day due to Dr. Dillon being on vacation. PT requested to wait until Dr. Dillon was back from vacation to reschedule. I informed PT that she did not have any openings before she would be leaving. PT inquired if there would be a new MD to replace her. I informed him that I thought they would hire a new MD, but I was not positive at this time. The PT stated he would see Tamie Alva this one time and he expects us to have a new MD on staff for PTs to see or he will be looking for somewhere that does in the future. He asked me to pass along his concerns to the practice manager. I assured the PT I would and apologized again for the inconvenience this has caused.  I contacted Chasidy Swanson and informed her on the situation as well.

## 2022-10-05 DIAGNOSIS — F43.23 SITUATIONAL MIXED ANXIETY AND DEPRESSIVE DISORDER: ICD-10-CM

## 2022-10-05 RX ORDER — ALPRAZOLAM 0.5 MG/1
0.5 TABLET ORAL NIGHTLY PRN
Qty: 30 TABLET | Refills: 0 | Status: CANCELLED | OUTPATIENT
Start: 2022-10-05

## 2022-10-05 NOTE — TELEPHONE ENCOUNTER
Caller: Caleb RUBIO    Relationship: Self    Best call back number: [phone]    Requested Prescriptions:   Requested Prescriptions     Pending Prescriptions Disp Refills   • ALPRAZolam (XANAX) 0.5 MG tablet 30 tablet 5     Sig: Take 1 tablet by mouth At Night As Needed for Anxiety.        Pharmacy where request should be sent:      [facility] [address] [place] [address] [phone]  - [phone]   [phone]    Additional details provided by patient: PATIENT IS NOT READY FOR REFILL YET BUT WILL BE LEAVING ON THE 22ND FOR A TRIP AND WANTS TO MAKE SURE THAT HE IS ABLE TO JUST CALL AND GET HIS REFILLS    Does the patient have less than a 3 day supply:  [] Yes  [x] No    Cindy Noriega Rep   10/05/22 15:03 CDT

## 2022-10-08 DIAGNOSIS — F43.23 SITUATIONAL MIXED ANXIETY AND DEPRESSIVE DISORDER: ICD-10-CM

## 2022-10-11 RX ORDER — ALPRAZOLAM 0.5 MG/1
0.5 TABLET ORAL NIGHTLY PRN
Qty: 30 TABLET | Refills: 0 | Status: SHIPPED | OUTPATIENT
Start: 2022-10-11 | End: 2022-11-22

## 2022-10-18 ENCOUNTER — LAB (OUTPATIENT)
Dept: INTERNAL MEDICINE | Facility: CLINIC | Age: 82
End: 2022-10-18

## 2022-10-18 DIAGNOSIS — I10 BENIGN ESSENTIAL HTN: Primary | ICD-10-CM

## 2022-10-18 DIAGNOSIS — E11.9 DIABETES MELLITUS WITHOUT COMPLICATION: ICD-10-CM

## 2022-10-18 DIAGNOSIS — E78.00 PURE HYPERCHOLESTEROLEMIA: ICD-10-CM

## 2022-10-18 DIAGNOSIS — E03.9 ACQUIRED HYPOTHYROIDISM: ICD-10-CM

## 2022-10-19 LAB
ALBUMIN SERPL-MCNC: 4.7 G/DL (ref 3.5–5.2)
ALBUMIN/GLOB SERPL: 2.5 G/DL
ALP SERPL-CCNC: 58 U/L (ref 39–117)
ALT SERPL-CCNC: 12 U/L (ref 1–41)
AST SERPL-CCNC: 18 U/L (ref 1–40)
BILIRUB SERPL-MCNC: 0.6 MG/DL (ref 0–1.2)
BUN SERPL-MCNC: 15 MG/DL (ref 8–23)
BUN/CREAT SERPL: 12.8 (ref 7–25)
CALCIUM SERPL-MCNC: 9.7 MG/DL (ref 8.6–10.5)
CHLORIDE SERPL-SCNC: 101 MMOL/L (ref 98–107)
CHOLEST SERPL-MCNC: 151 MG/DL (ref 0–200)
CO2 SERPL-SCNC: 28 MMOL/L (ref 22–29)
CREAT SERPL-MCNC: 1.17 MG/DL (ref 0.76–1.27)
EGFRCR SERPLBLD CKD-EPI 2021: 62.6 ML/MIN/1.73
GLOBULIN SER CALC-MCNC: 1.9 GM/DL
GLUCOSE SERPL-MCNC: 130 MG/DL (ref 65–99)
HBA1C MFR BLD: 7.3 % (ref 4.8–5.6)
HDLC SERPL-MCNC: 33 MG/DL (ref 40–60)
LDLC SERPL CALC-MCNC: 91 MG/DL (ref 0–100)
POTASSIUM SERPL-SCNC: 5 MMOL/L (ref 3.5–5.2)
PROT SERPL-MCNC: 6.6 G/DL (ref 6–8.5)
SODIUM SERPL-SCNC: 140 MMOL/L (ref 136–145)
TRIGL SERPL-MCNC: 155 MG/DL (ref 0–150)
TSH SERPL DL<=0.005 MIU/L-ACNC: 3.89 UIU/ML (ref 0.27–4.2)
VLDLC SERPL CALC-MCNC: 27 MG/DL (ref 5–40)

## 2022-10-19 NOTE — PROGRESS NOTES
Will discuss in upcoming office visit  Cholesterol is good  A1c is worse- will need to adjust diabetic medications

## 2022-10-20 ENCOUNTER — OFFICE VISIT (OUTPATIENT)
Dept: INTERNAL MEDICINE | Facility: CLINIC | Age: 82
End: 2022-10-20

## 2022-10-20 VITALS
SYSTOLIC BLOOD PRESSURE: 142 MMHG | DIASTOLIC BLOOD PRESSURE: 70 MMHG | TEMPERATURE: 97.1 F | HEIGHT: 76 IN | BODY MASS INDEX: 26.3 KG/M2 | WEIGHT: 216 LBS | HEART RATE: 69 BPM | OXYGEN SATURATION: 98 %

## 2022-10-20 DIAGNOSIS — E11.59 TYPE 2 DIABETES MELLITUS WITH OTHER CIRCULATORY COMPLICATIONS: ICD-10-CM

## 2022-10-20 DIAGNOSIS — E11.9 CONTROLLED TYPE 2 DIABETES MELLITUS WITHOUT COMPLICATION, WITHOUT LONG-TERM CURRENT USE OF INSULIN: Primary | ICD-10-CM

## 2022-10-20 DIAGNOSIS — I10 BENIGN ESSENTIAL HTN: ICD-10-CM

## 2022-10-20 DIAGNOSIS — E78.00 PURE HYPERCHOLESTEROLEMIA: ICD-10-CM

## 2022-10-20 DIAGNOSIS — R60.0 EDEMA OF BOTH LOWER EXTREMITIES: ICD-10-CM

## 2022-10-20 PROCEDURE — 99214 OFFICE O/P EST MOD 30 MIN: CPT | Performed by: NURSE PRACTITIONER

## 2022-10-20 RX ORDER — GLIPIZIDE 2.5 MG/1
2.5 TABLET, EXTENDED RELEASE ORAL DAILY
Qty: 30 TABLET | Refills: 11 | Status: CANCELLED | OUTPATIENT
Start: 2022-10-20

## 2022-10-20 NOTE — PROGRESS NOTES
"Chief Complaint  Diabetes (Follow up), Hypertension, and Hyperlipidemia    Subjective        Caleb RUBIO presents to [facility]  History of Present Illness  Patient here today for a follow-up of his diabetes, hypertension, and hyperlipidemia.  Patient states that he is doing well but is concerned about swelling to his lower extremities.      Objective   Vital Signs:  /70 (BP Location: Left arm, Patient Position: Sitting, Cuff Size: Adult)   Pulse 69   Temp 97.1 °F (36.2 °C) (Temporal)   Ht 193 cm (76\")   Wt 98 kg (216 lb)   SpO2 98%   BMI 26.29 kg/m²   Estimated body mass index is 26.29 kg/m² as calculated from the following:    Height as of this encounter: 193 cm (76\").    Weight as of this encounter: 98 kg (216 lb).    BMI is >= 25 and <30. (Overweight) The following options were offered after discussion;: weight loss educational material (shared in after visit summary), exercise counseling/recommendations and nutrition counseling/recommendations      Physical Exam  Vitals and nursing note reviewed.   Constitutional:       Appearance: Normal appearance. He is well-developed.   HENT:      Head: Normocephalic and atraumatic.      Right Ear: Tympanic membrane, ear canal and external ear normal.      Left Ear: Tympanic membrane, ear canal and external ear normal.      Nose: Nose normal. No septal deviation, nasal tenderness or congestion.      Mouth/Throat:      Lips: Pink. No lesions.      Mouth: Mucous membranes are moist. No oral lesions.      Dentition: Normal dentition.      Pharynx: Oropharynx is clear. No pharyngeal swelling, oropharyngeal exudate or posterior oropharyngeal erythema.   Eyes:      General: Lids are normal. Vision grossly intact. No scleral icterus.        Right eye: No discharge.         Left eye: No discharge.      Extraocular Movements: Extraocular movements intact.      Conjunctiva/sclera: Conjunctivae normal.      Right eye: Right conjunctiva is " not injected.      Left eye: Left conjunctiva is not injected.      Pupils: Pupils are equal, round, and reactive to light.   Neck:      Thyroid: No thyroid mass.      Trachea: Trachea normal.   Cardiovascular:      Rate and Rhythm: Normal rate and regular rhythm.      Heart sounds: Normal heart sounds. No murmur heard.    No gallop.   Pulmonary:      Effort: Pulmonary effort is normal.      Breath sounds: Normal breath sounds and air entry. No wheezing, rhonchi or rales.   Musculoskeletal:         General: No tenderness or deformity. Normal range of motion.      Cervical back: Full passive range of motion without pain, normal range of motion and neck supple.      Thoracic back: Normal.      Right lower le+ Edema present.      Left lower le+ Edema present.   Skin:     General: Skin is warm and dry.      Coloration: Skin is not jaundiced.      Findings: No rash.   Neurological:      Mental Status: He is alert and oriented to person, place, and time.      Cranial Nerves: Cranial nerves are intact.      Sensory: Sensation is intact.      Motor: Motor function is intact.      Coordination: Coordination is intact.      Gait: Gait is intact.      Deep Tendon Reflexes: Reflexes are normal and symmetric.   Psychiatric:         Mood and Affect: Mood and affect normal.         Behavior: Behavior normal.         Judgment: Judgment normal.        Result Review :  The following data was reviewed by: MERARI Ochoa on 10/20/2022:  Common labs    Common Labs 4/8/22 4/8/22 4/8/22 4/8/22 4/8/22 4/8/22 7/29/22 7/29/22 10/18/22 10/18/22 10/18/22    0715 0715 0715 0715 0715 0715 1313 1313 [id number]   Glucose  149 (A)      185 (A) 130 (A)     BUN  17      21 15     Creatinine  1.01      1.07 1.17     Sodium  138      143 140     Potassium  4.0      3.9 5.0     Chloride  103      106 101     Calcium  9.2      8.9 9.7     Total Protein  6.5       6.6     Albumin  4.40       4.70     Total Bilirubin  0.4       0.6      Alkaline Phosphatase  69       58     AST (SGOT)  15       18     ALT (SGPT)  18       12     WBC    5.66   5.27       Hemoglobin    13.8   12.0 (A)       Hematocrit    42.6   37.9       Platelets    168   199       Total Cholesterol   167       151    Triglycerides   190 (A)       155 (A)    HDL Cholesterol   37 (A)       33 (A)    LDL Cholesterol    97       91    Hemoglobin A1C 6.90 (A)          7.30 (A)   Microalbumin, Urine      5.3        Uric Acid     4.2         (A) Abnormal value       Comments are available for some flowsheets but are not being displayed.                     Assessment and Plan   Diagnoses and all orders for this visit:    1. Controlled type 2 diabetes mellitus without complication, without long-term current use of insulin (HCC) (Primary)    2. Edema of both lower extremities  -     US Ankle / Brachial Indices Extremity Complete; Future    3. Type 2 diabetes mellitus with other circulatory complications (HCC)  -     US Ankle / Brachial Indices Extremity Complete; Future    4. Benign essential HTN    5. Pure hypercholesterolemia      Patient is here today to follow-up on his diabetes, high blood pressure, and high cholesterol.  Labs were reviewed today in office.  It is noted that his hemoglobin A1c went up to 7.3.  I wanted to originally increase his Glucotrol to 10 mg a day.  However the patient is asked for me to hold off at this time.  He wishes to work on diet changes and exercise.  We will recheck his A1c in 3 months.  Blood pressures controlled at this time.  Cholesterol is also controlled at this time with medication.  Patient is worried about the swelling to who both of his lower extremities.  He states that he has not been as active as he is used to and he has been wearing compression socks, however his legs are still swelling.  On exam there is about 2+ pitting edema noted to bilateral lower extremities.  Skin is warm dry and pink in color.  I do believe that it is due to a  more sedentary lifestyle.  I recommended the patient to get compression hose to help with the swelling.  Patient is also going on a long vacation in which they will be driving to multiple different areas.  Counseling and education was provided on getting out and moving around and to also wear his compression hose at that time.  We will also order an CHETAN just to check for anyarterial insufficiencies.  Plan:  1. Will not increase glucotrol at this time due to patient preference, rechec a1c in 3 m onths  2. Continue other meds the same  3. CHETAN due to swelling       Follow Up   Return in about 3 months (around 1/20/2023) for Recheck DM Check.  Patient was given instructions and counseling regarding his condition or for health maintenance advice. Please see specific information pulled into the AVS if appropriate.

## 2022-11-17 ENCOUNTER — HOSPITAL ENCOUNTER (OUTPATIENT)
Dept: ULTRASOUND IMAGING | Facility: HOSPITAL | Age: 82
Discharge: HOME | End: 2022-11-17
Admitting: NURSE PRACTITIONER

## 2022-11-17 DIAGNOSIS — R60.0 EDEMA OF BOTH LOWER EXTREMITIES: ICD-10-CM

## 2022-11-17 DIAGNOSIS — I77.9 ARTERIAL DISEASE: Primary | ICD-10-CM

## 2022-11-17 DIAGNOSIS — E11.59 TYPE 2 DIABETES MELLITUS WITH OTHER CIRCULATORY COMPLICATIONS: ICD-10-CM

## 2022-11-17 PROCEDURE — 93923 UPR/LXTR ART STDY 3+ LVLS: CPT | Performed by: SURGERY

## 2022-11-17 PROCEDURE — 93923 UPR/LXTR ART STDY 3+ LVLS: CPT

## 2022-11-22 DIAGNOSIS — F43.23 SITUATIONAL MIXED ANXIETY AND DEPRESSIVE DISORDER: ICD-10-CM

## 2022-11-22 RX ORDER — ALPRAZOLAM 0.5 MG/1
0.5 TABLET ORAL NIGHTLY PRN
Qty: 30 TABLET | Refills: 2 | Status: SHIPPED | OUTPATIENT
Start: 2022-11-22 | End: 2023-01-17 | Stop reason: SDUPTHER

## 2022-11-30 ENCOUNTER — OFFICE VISIT (OUTPATIENT)
Dept: UROLOGY | Facility: CLINIC | Age: 82
End: 2022-11-30

## 2022-11-30 VITALS — HEIGHT: 76 IN | BODY MASS INDEX: 26.16 KG/M2 | WEIGHT: 214.8 LBS | TEMPERATURE: 97.1 F

## 2022-11-30 DIAGNOSIS — N52.01 ERECTILE DYSFUNCTION DUE TO ARTERIAL INSUFFICIENCY: ICD-10-CM

## 2022-11-30 DIAGNOSIS — N40.0 BENIGN PROSTATIC HYPERPLASIA WITHOUT LOWER URINARY TRACT SYMPTOMS: Primary | ICD-10-CM

## 2022-11-30 LAB
BILIRUB BLD-MCNC: NEGATIVE MG/DL
CLARITY, POC: CLEAR
COLOR UR: YELLOW
GLUCOSE UR STRIP-MCNC: NEGATIVE MG/DL
KETONES UR QL: NEGATIVE
LEUKOCYTE EST, POC: NEGATIVE
NITRITE UR-MCNC: NEGATIVE MG/ML
PH UR: 5.5 [PH] (ref 5–8)
PROT UR STRIP-MCNC: NEGATIVE MG/DL
RBC # UR STRIP: NEGATIVE /UL
SP GR UR: 1.01 (ref 1–1.03)
UROBILINOGEN UR QL: NORMAL

## 2022-11-30 PROCEDURE — 99213 OFFICE O/P EST LOW 20 MIN: CPT | Performed by: UROLOGY

## 2022-11-30 PROCEDURE — 81001 URINALYSIS AUTO W/SCOPE: CPT | Performed by: UROLOGY

## 2022-12-05 ENCOUNTER — OFFICE VISIT (OUTPATIENT)
Dept: GASTROENTEROLOGY | Facility: CLINIC | Age: 82
End: 2022-12-05

## 2022-12-05 VITALS
HEART RATE: 62 BPM | DIASTOLIC BLOOD PRESSURE: 72 MMHG | BODY MASS INDEX: 26.18 KG/M2 | WEIGHT: 215 LBS | SYSTOLIC BLOOD PRESSURE: 140 MMHG | HEIGHT: 76 IN | TEMPERATURE: 96.3 F | OXYGEN SATURATION: 98 %

## 2022-12-05 DIAGNOSIS — K21.9 GASTROESOPHAGEAL REFLUX DISEASE: ICD-10-CM

## 2022-12-05 PROCEDURE — 99213 OFFICE O/P EST LOW 20 MIN: CPT | Performed by: CLINICAL NURSE SPECIALIST

## 2022-12-05 RX ORDER — ESOMEPRAZOLE MAGNESIUM 40 MG/1
40 CAPSULE, DELAYED RELEASE ORAL DAILY
Qty: 90 CAPSULE | Refills: 4 | Status: SHIPPED | OUTPATIENT
Start: 2022-12-05

## 2022-12-05 NOTE — PROGRESS NOTES
Caleb Wood  1940    12/5/2022  Chief Complaint   Patient presents with   • GI Problem     Discuss reflux     Subjective   HPI  Caleb Wood is a 82 y.o. male who presents with a complaint of reflux ongoing course constant. Stable with Nexium. No dysphagia. No abdominal pain. No change in bowels. No fever chills or sweats.    Past Medical History:   Diagnosis Date   • Diabetes mellitus (HCC)     Type 2   • Disease of thyroid gland    • GERD (gastroesophageal reflux disease)    • Hemorrhoids    • Hx of colonic polyps    • Hypercholesteremia      Past Surgical History:   Procedure Laterality Date   • CATARACT EXTRACTION Bilateral    • CHOLECYSTECTOMY     • COLONOSCOPY  01/17/2012    Colon polyp tissue not retrieved repeat exam in 5 years   • COLONOSCOPY  01/06/2009    adenomatous polyp x1   • COLONOSCOPY N/A 06/01/2017    Tubular adenoma Transvere colon, Diverticulosis repeat exam in 5 years   • COLONOSCOPY N/A 07/20/2022    Fair prep, 3 Tubular adenomas ascending colon and at 60 cm, tics repeat exam in 1 year   • CYSTOSCOPY TRANSURETHRAL RESECTION OF PROSTATE N/A 08/02/2022    Procedure: CYSTOSCOPY TRANSURETHRAL RESECTION OF PROSTATE;  Surgeon: López Post MD;  Location: [facility];  Service: Urology;  Laterality: N/A;   • ENDOSCOPY  06/25/2013    [facility]   • ENDOSCOPY N/A 01/29/2021    Multiple fundic gland polyps    • KNEE SURGERY     • SHOULDER SURGERY Right    • TONSILLECTOMY         Outpatient Medications Marked as Taking for the 12/5/22 encounter (Office Visit) with Deepti Brito APRN   Medication Sig Dispense Refill   • acetaminophen (TYLENOL) 500 MG tablet Take 500 mg by mouth Every 6 (Six) Hours As Needed for Mild Pain .     • ALPRAZolam (XANAX) 0.5 MG tablet TAKE 1 TABLET BY MOUTH AT NIGHT AS NEEDED FOR ANXIETY 30 tablet 2   • aluminum hydroxide-mag carbonate (GAVISCON EXTRA RELIEF) 160-105 MG chewable tablet chewable tablet Chew Daily As Needed.     • coenzyme Q10 100 MG capsule Take 200 mg  by mouth Every Evening.     • cyclobenzaprine (FLEXERIL) 10 MG tablet Take 1 tablet by mouth 3 (Three) Times a Day As Needed for Muscle Spasms. 30 tablet 1   • diclofenac (VOLTAREN) 1 % gel gel Apply 4 g topically to the appropriate area as directed 4 (Four) Times a Day As Needed. 1-3 times daily     • docusate sodium (Colace) 100 MG capsule Take 1 capsule by mouth 2 (Two) Times a Day. 60 capsule 1   • esomeprazole (nexIUM) 40 MG capsule Take 1 capsule by mouth Daily. 90 capsule 4   • finasteride (PROSCAR) 5 MG tablet Take 1 tablet by mouth Daily. 90 tablet 3   • glipizide (Glucotrol XL) 2.5 MG 24 hr tablet Take 1 tablet by mouth Daily. 30 tablet 11   • glucose blood (Contour Test) test strip 1 each by Other route Daily. Use as instructed 100 each 3   • ibuprofen (ADVIL,MOTRIN) 200 MG tablet Take 200 mg by mouth Every 8 (Eight) Hours As Needed for Mild Pain .     • levothyroxine (SYNTHROID, LEVOTHROID) 50 MCG tablet Take 1 tablet by mouth Daily. 90 tablet 1   • metFORMIN ER (GLUCOPHAGE-XR) 750 MG 24 hr tablet Take 1 tablet by mouth 2 (Two) Times a Day. 180 tablet 3   • ondansetron ODT (Zofran ODT) 4 MG disintegrating tablet Place 1 tablet on the tongue Every 6 (Six) Hours As Needed for Nausea. 6 tablet 1   • oxybutynin (DITROPAN) 5 MG tablet Take 1 tablet by mouth Every 8 (Eight) Hours As Needed (bladder spasms). 30 tablet 0   • phenazopyridine (PYRIDIUM) 100 MG tablet Take 1 tablet by mouth 3 (Three) Times a Day As Needed (urinary burning). 20 tablet 1   • rosuvastatin (CRESTOR) 5 MG tablet Take 1 tablet by mouth Every Other Day. 45 tablet 3   • tadalafil (Cialis) 20 MG tablet 1/2 to 1 tab by mouth 1 hour prior to intercourse 30 tablet 11   • tamsulosin (FLOMAX) 0.4 MG capsule 24 hr capsule Take two 0.4 mg tamsulosin daily (Patient taking differently: Take 2 capsules by mouth Daily.) 180 capsule 3   • traMADol (ULTRAM) 50 MG tablet Take 1 tablet by mouth Daily As Needed for Moderate Pain . 30 tablet 0   • traMADol  (ULTRAM) 50 MG tablet Take 1 tablet by mouth Every 8 (Eight) Hours As Needed for Severe Pain  (postop pain). 6 tablet 0   • [DISCONTINUED] esomeprazole (nexIUM) 40 MG capsule Take 1 capsule by mouth once daily 90 capsule 3     Allergies   Allergen Reactions   • Aleve [Naproxen] Hallucinations     Social History     Socioeconomic History   • Marital status:    Tobacco Use   • Smoking status: Former     Types: Cigarettes     Quit date:      Years since quittin.9   • Smokeless tobacco: Never   Vaping Use   • Vaping Use: Never used   Substance and Sexual Activity   • Alcohol use: No   • Drug use: No   • Sexual activity: Not Currently     Family History   Problem Relation Age of Onset   • No Known Problems Mother    • No Known Problems Father    • Colon cancer Neg Hx    • Colon polyps Neg Hx      Health Maintenance   Topic Date Due   • TDAP/TD VACCINES (1 - Tdap) Never done   • Pneumococcal Vaccine 65+ (2 - PPSV23 if available, else PCV20) 2021   • DIABETIC EYE EXAM  2022   • COVID-19 Vaccine (4 - Booster for Moderna series) 2023   • URINE MICROALBUMIN  2023   • ANNUAL WELLNESS VISIT  2023   • HEMOGLOBIN A1C  2023   • COLORECTAL CANCER SCREENING  2023   • LIPID PANEL  10/18/2023   • INFLUENZA VACCINE  Completed   • ZOSTER VACCINE  Completed     Review of Systems   Constitutional: Negative for activity change, appetite change, chills, diaphoresis, fatigue, fever and unexpected weight change.   HENT: Negative for ear pain, hearing loss, mouth sores, sore throat, trouble swallowing and voice change.    Eyes: Negative.    Respiratory: Negative for cough, choking, shortness of breath and wheezing.    Cardiovascular: Negative for chest pain and palpitations.   Gastrointestinal: Negative for abdominal pain, blood in stool, constipation, diarrhea, nausea and vomiting.   Endocrine: Negative for cold intolerance and heat intolerance.   Genitourinary: Negative for decreased  "urine volume, dysuria, frequency, hematuria and urgency.   Musculoskeletal: Negative for back pain, gait problem and myalgias.   Skin: Negative for color change, pallor and rash.   Allergic/Immunologic: Negative for food allergies and immunocompromised state.   Neurological: Negative for dizziness, tremors, seizures, syncope, weakness, light-headedness, numbness and headaches.   Hematological: Negative for adenopathy. Does not bruise/bleed easily.   Psychiatric/Behavioral: Negative for agitation and confusion. The patient is not nervous/anxious.    All other systems reviewed and are negative.    Objective   Vitals:    12/05/22 1244   BP: 140/72   Pulse: 62   Temp: 96.3 °F (35.7 °C)   TempSrc: Temporal   SpO2: 98%   Weight: 97.5 kg (215 lb)   Height: 193 cm (76\")     Body mass index is 26.17 kg/m².  Physical Exam  Constitutional:       Appearance: He is well-developed.   HENT:      Head: Normocephalic and atraumatic.   Eyes:      Pupils: Pupils are equal, round, and reactive to light.   Neck:      Trachea: No tracheal deviation.   Cardiovascular:      Rate and Rhythm: Normal rate and regular rhythm.      Heart sounds: Normal heart sounds. No murmur heard.    No friction rub. No gallop.   Pulmonary:      Effort: Pulmonary effort is normal. No respiratory distress.      Breath sounds: Normal breath sounds. No wheezing or rales.   Chest:      Chest wall: No tenderness.   Abdominal:      General: Bowel sounds are normal. There is no distension.      Palpations: Abdomen is soft. Abdomen is not rigid.      Tenderness: There is no abdominal tenderness. There is no guarding or rebound.   Musculoskeletal:         General: No tenderness or deformity. Normal range of motion.      Cervical back: Normal range of motion and neck supple.   Skin:     General: Skin is warm and dry.      Coloration: Skin is not pale.      Findings: No rash.   Neurological:      Mental Status: He is alert and oriented to person, place, and time.      " Deep Tendon Reflexes: Reflexes are normal and symmetric.   Psychiatric:         Behavior: Behavior normal.         Thought Content: Thought content normal.         Judgment: Judgment normal.       Assessment & Plan   Diagnoses and all orders for this visit:    1. Gastroesophageal reflux disease  -     esomeprazole (nexIUM) 40 MG capsule; Take 1 capsule by mouth Daily.  Dispense: 90 capsule; Refill: 4      Due next year for his colonoscopy.   I discussed non pharmaceutical treatment of gerd.  This includes gradually losing weight to achieve ideal body wt., elevation of the head of bed by 4-6 inches, nothing to eat or drink 3 hours prior to lying down, avoiding tight clothing, stress reduction, tobacco cessation, reduction of alcohol intake, and dietary restrictions (avoiding caffeine, coffee, fatty foods, mints, chocolate, spicy foods and tomato based sauces as much as possible).    Continue Nexium  Stable at this time to call with any problems.   Part of this note may be an electronic transcription/translation of spoken language to printed text using the Dragon Dictation System.  Body mass index is 26.17 kg/m².  No follow-ups on file.    BMI is >= 25 and <30. (Overweight) The following options were offered after discussion;: weight loss educational material (shared in after visit summary)      All risks, benefits, alternatives, and indications of colonoscopy and/or Endoscopy procedure have been discussed with the patient. Risks to include perforation of the colon requiring possible surgery or colostomy, risk of bleeding from biopsies or removal of colon tissue, possibility of missing a colon polyp or cancer, or adverse drug reaction.  Benefits to include the diagnosis and management of disease of the colon and rectum. Alternatives to include barium enema, radiographic evaluation, lab testing or no intervention. Pt verbalizes understanding and agrees.     Deepti Brito, APRN  12/5/2022  12:59 CST          If  you smoke or use tobacco, 4 minutes reading provided  Steps to Quit Smoking  Smoking tobacco can be harmful to your health and can affect almost every organ in your body. Smoking puts you, and those around you, at risk for developing many serious chronic diseases. Quitting smoking is difficult, but it is one of the best things that you can do for your health. It is never too late to quit.  What are the benefits of quitting smoking?  When you quit smoking, you lower your risk of developing serious diseases and conditions, such as:  · Lung cancer or lung disease, such as COPD.  · Heart disease.  · Stroke.  · Heart attack.  · Infertility.  · Osteoporosis and bone fractures.  Additionally, symptoms such as coughing, wheezing, and shortness of breath may get better when you quit. You may also find that you get sick less often because your body is stronger at fighting off colds and infections. If you are pregnant, quitting smoking can help to reduce your chances of having a baby of low birth weight.  How do I get ready to quit?  When you decide to quit smoking, create a plan to make sure that you are successful. Before you quit:  · Pick a date to quit. Set a date within the next two weeks to give you time to prepare.  · Write down the reasons why you are quitting. Keep this list in places where you will see it often, such as on your bathroom mirror or in your car or wallet.  · Identify the people, places, things, and activities that make you want to smoke (triggers) and avoid them. Make sure to take these actions:  ¨ Throw away all cigarettes at home, at work, and in your car.  ¨ Throw away smoking accessories, such as ashtrays and lighters.  ¨ Clean your car and make sure to empty the ashtray.  ¨ Clean your home, including curtains and carpets.  · Tell your family, friends, and coworkers that you are quitting. Support from your loved ones can make quitting easier.  · Talk with your health care provider about your options  for quitting smoking.  · Find out what treatment options are covered by your health insurance.  What strategies can I use to quit smoking?  Talk with your healthcare provider about different strategies to quit smoking. Some strategies include:  · Quitting smoking altogether instead of gradually lessening how much you smoke over a period of time. Research shows that quitting “cold turkey” is more successful than gradually quitting.  · Attending in-person counseling to help you build problem-solving skills. You are more likely to have success in quitting if you attend several counseling sessions. Even short sessions of 10 minutes can be effective.  · Finding resources and support systems that can help you to quit smoking and remain smoke-free after you quit. These resources are most helpful when you use them often. They can include:  ¨ Online chats with a counselor.  ¨ Telephone quitlines.  ¨ Printed self-help materials.  ¨ Support groups or group counseling.  ¨ Text messaging programs.  ¨ Mobile phone applications.  · Taking medicines to help you quit smoking. (If you are pregnant or breastfeeding, talk with your health care provider first.) Some medicines contain nicotine and some do not. Both types of medicines help with cravings, but the medicines that include nicotine help to relieve withdrawal symptoms. Your health care provider may recommend:  ¨ Nicotine patches, gum, or lozenges.  ¨ Nicotine inhalers or sprays.  ¨ Non-nicotine medicine that is taken by mouth.  Talk with your health care provider about combining strategies, such as taking medicines while you are also receiving in-person counseling. Using these two strategies together makes you more likely to succeed in quitting than if you used either strategy on its own.  If you are pregnant or breastfeeding, talk with your health care provider about finding counseling or other support strategies to quit smoking. Do not take medicine to help you quit smoking  unless told to do so by your health care provider.  What things can I do to make it easier to quit?  Quitting smoking might feel overwhelming at first, but there is a lot that you can do to make it easier. Take these important actions:  · Reach out to your family and friends and ask that they support and encourage you during this time. Call telephone quitlines, reach out to support groups, or work with a counselor for support.  · Ask people who smoke to avoid smoking around you.  · Avoid places that trigger you to smoke, such as bars, parties, or smoke-break areas at work.  · Spend time around people who do not smoke.  · Lessen stress in your life, because stress can be a smoking trigger for some people. To lessen stress, try:  ¨ Exercising regularly.  ¨ Deep-breathing exercises.  ¨ Yoga.  ¨ Meditating.  ¨ Performing a body scan. This involves closing your eyes, scanning your body from head to toe, and noticing which parts of your body are particularly tense. Purposefully relax the muscles in those areas.  · Download or purchase mobile phone or tablet apps (applications) that can help you stick to your quit plan by providing reminders, tips, and encouragement. There are many free apps, such as QuitGuide from the CDC (Centers for Disease Control and Prevention). You can find other support for quitting smoking (smoking cessation) through smokefree.gov and other websites.  How will I feel when I quit smoking?  Within the first 24 hours of quitting smoking, you may start to feel some withdrawal symptoms. These symptoms are usually most noticeable 2-3 days after quitting, but they usually do not last beyond 2-3 weeks. Changes or symptoms that you might experience include:  · Mood swings.  · Restlessness, anxiety, or irritation.  · Difficulty concentrating.  · Dizziness.  · Strong cravings for sugary foods in addition to nicotine.  · Mild weight gain.  · Constipation.  · Nausea.  · Coughing or a sore throat.  · Changes in  how your medicines work in your body.  · A depressed mood.  · Difficulty sleeping (insomnia).  After the first 2-3 weeks of quitting, you may start to notice more positive results, such as:  · Improved sense of smell and taste.  · Decreased coughing and sore throat.  · Slower heart rate.  · Lower blood pressure.  · Clearer skin.  · The ability to breathe more easily.  · Fewer sick days.  Quitting smoking is very challenging for most people. Do not get discouraged if you are not successful the first time. Some people need to make many attempts to quit before they achieve long-term success. Do your best to stick to your quit plan, and talk with your health care provider if you have any questions or concerns.  This information is not intended to replace advice given to you by your health care provider. Make sure you discuss any questions you have with your health care provider.  Document Released: 12/12/2002 Document Revised: 08/15/2017 Document Reviewed: 05/03/2016  [employer] Interactive Patient Education © 2017 [employer]

## 2022-12-12 ENCOUNTER — TELEPHONE (OUTPATIENT)
Dept: VASCULAR SURGERY | Facility: CLINIC | Age: 82
End: 2022-12-12

## 2022-12-13 ENCOUNTER — PATIENT ROUNDING (BHMG ONLY) (OUTPATIENT)
Dept: VASCULAR SURGERY | Facility: CLINIC | Age: 82
End: 2022-12-13

## 2022-12-13 ENCOUNTER — OFFICE VISIT (OUTPATIENT)
Dept: VASCULAR SURGERY | Facility: CLINIC | Age: 82
End: 2022-12-13

## 2022-12-13 VITALS
HEIGHT: 76 IN | DIASTOLIC BLOOD PRESSURE: 76 MMHG | OXYGEN SATURATION: 98 % | BODY MASS INDEX: 26.18 KG/M2 | SYSTOLIC BLOOD PRESSURE: 138 MMHG | WEIGHT: 215 LBS | HEART RATE: 68 BPM

## 2022-12-13 DIAGNOSIS — E11.9 CONTROLLED TYPE 2 DIABETES MELLITUS WITHOUT COMPLICATION, WITHOUT LONG-TERM CURRENT USE OF INSULIN: ICD-10-CM

## 2022-12-13 DIAGNOSIS — I87.323 CHRONIC VENOUS HYPERTENSION WITH INFLAMMATION INVOLVING BOTH SIDES: Primary | ICD-10-CM

## 2022-12-13 DIAGNOSIS — I10 BENIGN ESSENTIAL HTN: ICD-10-CM

## 2022-12-13 DIAGNOSIS — I65.23 BILATERAL CAROTID ARTERY STENOSIS: ICD-10-CM

## 2022-12-13 DIAGNOSIS — E78.00 PURE HYPERCHOLESTEROLEMIA: ICD-10-CM

## 2022-12-13 DIAGNOSIS — M79.89 LEG SWELLING: ICD-10-CM

## 2022-12-13 PROCEDURE — 99204 OFFICE O/P NEW MOD 45 MIN: CPT | Performed by: SURGERY

## 2022-12-13 NOTE — PROGRESS NOTES
12/13/2022      Tamie Alva, APRN  [address] [facility] [place] [address]    Caleb Wood  1940    Chief Complaint   Patient presents with   • NEW PATIENT     Referred by Brooke for Arterial Disease. CHETAN done 11/17/22. Patient states hes been having swelling in both legs for the past few months. Denies pain while walking but does state that he has a dull ache in upper right leg is sitting for a long time.        Dear Tamie Alva, BRIDGET*    HPI  I had the pleasure of seeing your patient Caleb Wood in the office today.  Thank you kindly for this consultation.  As you recall, Caleb Wood is a 82 y.o.  male who you are currently following for routine health maintenance.  He denies any claudication to his lower extremities.  He is having leg swelling to his lower extremities.  He denies heaviness, tiredness, aching, or cramping at night.  He is maintained on Crestor.  He sees Dr. Tejeda's assistant in [place] for diabetic foot care. He did have noninvasive testing performed, which I did review in office.       Past Medical History:   Diagnosis Date   • Diabetes mellitus (HCC)     Type 2   • Disease of thyroid gland    • GERD (gastroesophageal reflux disease)    • Hemorrhoids    • Hx of colonic polyps    • Hypercholesteremia        Past Surgical History:   Procedure Laterality Date   • CATARACT EXTRACTION Bilateral    • CHOLECYSTECTOMY     • COLONOSCOPY  01/17/2012    Colon polyp tissue not retrieved repeat exam in 5 years   • COLONOSCOPY  01/06/2009    adenomatous polyp x1   • COLONOSCOPY N/A 06/01/2017    Tubular adenoma Transvere colon, Diverticulosis repeat exam in 5 years   • COLONOSCOPY N/A 07/20/2022    Fair prep, 3 Tubular adenomas ascending colon and at 60 cm, tics repeat exam in 1 year   • CYSTOSCOPY TRANSURETHRAL RESECTION OF PROSTATE N/A 08/02/2022    Procedure: CYSTOSCOPY TRANSURETHRAL RESECTION OF PROSTATE;  Surgeon: López Post MD;  Location: [facility] OR;  Service:  Urology;  Laterality: N/A;   • ENDOSCOPY  2013       • ENDOSCOPY N/A 2021    Multiple fundic gland polyps    • KNEE SURGERY     • SHOULDER SURGERY Right    • TONSILLECTOMY         Family History   Problem Relation Age of Onset   • No Known Problems Mother    • No Known Problems Father    • Colon cancer Neg Hx    • Colon polyps Neg Hx        Social History     Socioeconomic History   • Marital status:    Tobacco Use   • Smoking status: Former     Types: Cigarettes     Quit date: 1974     Years since quittin.9   • Smokeless tobacco: Never   Vaping Use   • Vaping Use: Never used   Substance and Sexual Activity   • Alcohol use: No   • Drug use: No   • Sexual activity: Not Currently       Allergies   Allergen Reactions   • Aleve [Naproxen] Hallucinations   • Tape Rash     Surgical tape         Current Outpatient Medications:   •  acetaminophen (TYLENOL) 500 MG tablet, Take 500 mg by mouth Every 6 (Six) Hours As Needed for Mild Pain ., Disp: , Rfl:   •  ALPRAZolam (XANAX) 0.5 MG tablet, TAKE 1 TABLET BY MOUTH AT NIGHT AS NEEDED FOR ANXIETY, Disp: 30 tablet, Rfl: 2  •  aluminum hydroxide-mag carbonate (GAVISCON EXTRA RELIEF) 160-105 MG chewable tablet chewable tablet, Chew Daily As Needed., Disp: , Rfl:   •  coenzyme Q10 100 MG capsule, Take 200 mg by mouth Every Evening., Disp: , Rfl:   •  cyclobenzaprine (FLEXERIL) 10 MG tablet, Take 1 tablet by mouth 3 (Three) Times a Day As Needed for Muscle Spasms., Disp: 30 tablet, Rfl: 1  •  diclofenac (VOLTAREN) 1 % gel gel, Apply 4 g topically to the appropriate area as directed 4 (Four) Times a Day As Needed. 1-3 times daily, Disp: , Rfl:   •  docusate sodium (Colace) 100 MG capsule, Take 1 capsule by mouth 2 (Two) Times a Day., Disp: 60 capsule, Rfl: 1  •  esomeprazole (nexIUM) 40 MG capsule, Take 1 capsule by mouth Daily., Disp: 90 capsule, Rfl: 4  •  finasteride (PROSCAR) 5 MG tablet, Take 1 tablet by mouth Daily., Disp: 90 tablet, Rfl: 3  •   glipizide (Glucotrol XL) 2.5 MG 24 hr tablet, Take 1 tablet by mouth Daily., Disp: 30 tablet, Rfl: 11  •  glucose blood (Contour Test) test strip, 1 each by Other route Daily. Use as instructed, Disp: 100 each, Rfl: 3  •  ibuprofen (ADVIL,MOTRIN) 200 MG tablet, Take 200 mg by mouth Every 8 (Eight) Hours As Needed for Mild Pain ., Disp: , Rfl:   •  levothyroxine (SYNTHROID, LEVOTHROID) 50 MCG tablet, Take 1 tablet by mouth Daily., Disp: 90 tablet, Rfl: 1  •  metFORMIN ER (GLUCOPHAGE-XR) 750 MG 24 hr tablet, Take 1 tablet by mouth 2 (Two) Times a Day., Disp: 180 tablet, Rfl: 3  •  ondansetron ODT (Zofran ODT) 4 MG disintegrating tablet, Place 1 tablet on the tongue Every 6 (Six) Hours As Needed for Nausea., Disp: 6 tablet, Rfl: 1  •  oxybutynin (DITROPAN) 5 MG tablet, Take 1 tablet by mouth Every 8 (Eight) Hours As Needed (bladder spasms)., Disp: 30 tablet, Rfl: 0  •  phenazopyridine (PYRIDIUM) 100 MG tablet, Take 1 tablet by mouth 3 (Three) Times a Day As Needed (urinary burning)., Disp: 20 tablet, Rfl: 1  •  rosuvastatin (CRESTOR) 5 MG tablet, Take 1 tablet by mouth Every Other Day., Disp: 45 tablet, Rfl: 3  •  tadalafil (Cialis) 20 MG tablet, 1/2 to 1 tab by mouth 1 hour prior to intercourse, Disp: 30 tablet, Rfl: 11  •  tamsulosin (FLOMAX) 0.4 MG capsule 24 hr capsule, Take two 0.4 mg tamsulosin daily (Patient taking differently: Take 2 capsules by mouth Daily.), Disp: 180 capsule, Rfl: 3  •  traMADol (ULTRAM) 50 MG tablet, Take 1 tablet by mouth Daily As Needed for Moderate Pain ., Disp: 30 tablet, Rfl: 0  •  traMADol (ULTRAM) 50 MG tablet, Take 1 tablet by mouth Every 8 (Eight) Hours As Needed for Severe Pain  (postop pain)., Disp: 6 tablet, Rfl: 0    Review of Systems   Constitutional: Negative.    HENT: Negative.    Eyes: Negative.    Respiratory: Negative.    Cardiovascular: Negative.    Gastrointestinal: Negative.    Endocrine: Negative.    Genitourinary: Negative.    Musculoskeletal: Negative.    Skin:  "Negative.    Allergic/Immunologic: Negative.    Neurological: Negative.    Hematological: Negative.    Psychiatric/Behavioral: Negative.    All other systems reviewed and are negative.    /76   Pulse 68   Ht 193 cm (76\")   Wt 97.5 kg (215 lb)   SpO2 98%   BMI 26.17 kg/m²     Physical Exam  Vitals and nursing note reviewed.   Constitutional:       Appearance: Normal appearance. He is well-developed.   HENT:      Head: Normocephalic and atraumatic.   Eyes:      General: No scleral icterus.     Pupils: Pupils are equal, round, and reactive to light.   Neck:      Thyroid: No thyromegaly.      Vascular: No carotid bruit or JVD.   Cardiovascular:      Rate and Rhythm: Normal rate and regular rhythm.      Pulses:           Carotid pulses are 2+ on the right side and 2+ on the left side.       Femoral pulses are 2+ on the right side and 2+ on the left side.       Popliteal pulses are 2+ on the right side and 2+ on the left side.        Dorsalis pedis pulses are 2+ on the right side and 2+ on the left side.        Posterior tibial pulses are 2+ on the right side and 2+ on the left side.      Heart sounds: Normal heart sounds.      Comments: Varicose veins   Pulmonary:      Effort: Pulmonary effort is normal.      Breath sounds: Normal breath sounds.   Abdominal:      General: Bowel sounds are normal. There is no distension or abdominal bruit.      Palpations: Abdomen is soft. There is no mass.      Tenderness: There is no abdominal tenderness.   Musculoskeletal:         General: Normal range of motion.      Cervical back: Neck supple.      Right lower leg: Edema present.      Left lower leg: Edema present.   Lymphadenopathy:      Cervical: No cervical adenopathy.   Skin:     General: Skin is warm and dry.   Neurological:      General: No focal deficit present.      Mental Status: He is alert and oriented to person, place, and time.      Cranial Nerves: No cranial nerve deficit.      Sensory: No sensory deficit. "   Psychiatric:         Mood and Affect: Mood normal.         Behavior: Behavior normal.         Thought Content: Thought content normal.         Judgment: Judgment normal.       Diagnostic Data:  US Ankle / Brachial Indices Extremity Complete    Result Date: 11/17/2022  Narrative: History: PAD  Comments: Bilateral lower extremity arterial with multi-level pulse volume recordings and segmental pressures were performed at rest and stress.  The right ankle/brachial index is not obtainable due to noncompressibility of the vessels. The waveforms are triphasic without dampening.  The left ankle/brachial index is not obtainable due to noncompressibility of the vessels. The waveforms are triphasic without dampening.      Impression: Impression: 1. The right ankle/brachial index was not obtainable due to noncompressibility of the vessels. 2. The left ankle/brachial index was not obtainable due to noncompressibility of the vessels. 3. The waveforms are triphasic and maintained at the ankle.  This report was finalized on 11/17/2022 13:01 by Dr. Víctor May MD.       Patient Active Problem List   Diagnosis   • Controlled type 2 diabetes mellitus without complication, without long-term current use of insulin (HCC)   • Hx of colonic polyps   • Acquired hypothyroidism   • Benign essential HTN   • Calculus of kidney   • Hematuria syndrome   • Hyperlipidemia   • Neutropenia (HCC)   • Neck pain   • Situational mixed anxiety and depressive disorder   • Diabetes mellitus without complication (HCC)   • History of colonic polyps   • Major depressive disorder with single episode, in partial remission (HCC)   • Chronic bilateral low back pain without sciatica   • Neuropathy, arm, right   • Dyspepsia   • BPH with obstruction/lower urinary tract symptoms        Diagnosis Plan   1. Chronic venous hypertension with inflammation involving both sides        2. Leg swelling        3. Benign essential HTN        4. Pure hypercholesterolemia         5. Controlled type 2 diabetes mellitus without complication, without long-term current use of insulin (HCC)        6. Bilateral carotid artery stenosis            Plan: After thoroughly evaluating Caleb Wood, I believe the best course of action is to remain conservative from a vascular surgery standpoint.  Currently, he does not appear to have any arterial insufficiency in either lower extremity.  His pulses are palpable.  He does have a swelling issue which is not bothersome to him.  I did give him a prescription for compression stockings.  I discussed with him how to wear them on a daily basis and to keep his legs elevated when he is not on them.  I will see him back in 1 years time with a carotid duplex for stroke surveillance.  I did discuss vascular risk factors as they pertain to the progression of vascular disease including controlling his hypertension, hyperlipidemia, and diabetes.  These risk factors re currently stable.  The patient is to continue taking their medications as previously discussed.   This was all discussed in full with complete understanding.  Thank you for allowing me to participate in the care of your patient.  Please do not hesitate to call with any questions or concerns.  We will keep you aware of any further encounters with Caleb Wood.        Sincerely yours,         Víctor May, Jr Aviles MD

## 2022-12-13 NOTE — PROGRESS NOTES
December 13, 2022    Hello, may I speak with Caleb Wood?    My name is Megan       I am  with [facility]  [address], [address]  [place] [address] [phone].    Before we get started may I verify your date of birth? 1940    I am calling to officially welcome you to our practice and ask about your recent visit. Is this a good time to talk? yes    Tell me about your visit with us. What things went well?  It was very nice . Better than I expected.       We're always looking for ways to make our patients' experiences even better. Do you have recommendations on ways we may improve?      Overall were you satisfied with your first visit to our practice? Yes        I appreciate you taking the time to speak with me today. Is there anything else I can do for you? Not today .      Thank you, and have a great day.

## 2022-12-27 NOTE — TELEPHONE ENCOUNTER
Caller: Caleb Wood    Relationship: Self    Best call back number: [phone]    Requested Prescriptions:   Requested Prescriptions     Pending Prescriptions Disp Refills   • rosuvastatin (CRESTOR) 5 MG tablet 45 tablet 3     Sig: Take 1 tablet by mouth Every Other Day.        Pharmacy where request should be sent: [facility] [address] [place] [address] [phone] [facility] [phone]      Additional details provided by patient: completely out     Does the patient have less than a 3 day supply:  [x] Yes  [] No    Would you like a call back once the refill request has been completed: [] Yes [] No    If the office needs to give you a call back, can they leave a voicemail: [] Yes [] No    Cindy Guzmán Rep   12/27/22 16:14 CST

## 2022-12-28 RX ORDER — ROSUVASTATIN CALCIUM 5 MG/1
5 TABLET, COATED ORAL EVERY OTHER DAY
Qty: 45 TABLET | Refills: 3 | Status: SHIPPED | OUTPATIENT
Start: 2022-12-28

## 2022-12-28 NOTE — ADDENDUM NOTE
Addended by: STAN FOWLER on: 12/28/2022 09:01 AM     Modules accepted: Level of Service, SmartSet

## 2023-01-17 ENCOUNTER — OFFICE VISIT (OUTPATIENT)
Dept: INTERNAL MEDICINE | Facility: CLINIC | Age: 83
End: 2023-01-17
Payer: MEDICARE

## 2023-01-17 VITALS
HEART RATE: 80 BPM | HEIGHT: 76 IN | SYSTOLIC BLOOD PRESSURE: 140 MMHG | TEMPERATURE: 97.9 F | BODY MASS INDEX: 26.18 KG/M2 | DIASTOLIC BLOOD PRESSURE: 80 MMHG | WEIGHT: 215 LBS | OXYGEN SATURATION: 99 %

## 2023-01-17 DIAGNOSIS — J02.9 SORE THROAT: Primary | ICD-10-CM

## 2023-01-17 DIAGNOSIS — Z79.899 ENCOUNTER FOR LONG TERM BENZODIAZEPINE THERAPY: ICD-10-CM

## 2023-01-17 DIAGNOSIS — Z79.899 OTHER LONG TERM (CURRENT) DRUG THERAPY: ICD-10-CM

## 2023-01-17 DIAGNOSIS — L60.3 NAIL DYSTROPHY: Chronic | ICD-10-CM

## 2023-01-17 DIAGNOSIS — E78.00 PURE HYPERCHOLESTEROLEMIA: Chronic | ICD-10-CM

## 2023-01-17 DIAGNOSIS — I10 BENIGN ESSENTIAL HTN: Chronic | ICD-10-CM

## 2023-01-17 DIAGNOSIS — E11.9 CONTROLLED TYPE 2 DIABETES MELLITUS WITHOUT COMPLICATION, WITHOUT LONG-TERM CURRENT USE OF INSULIN: ICD-10-CM

## 2023-01-17 DIAGNOSIS — F43.23 SITUATIONAL MIXED ANXIETY AND DEPRESSIVE DISORDER: ICD-10-CM

## 2023-01-17 DIAGNOSIS — R09.81 NASAL CONGESTION: ICD-10-CM

## 2023-01-17 DIAGNOSIS — N52.8 OTHER MALE ERECTILE DYSFUNCTION: Chronic | ICD-10-CM

## 2023-01-17 DIAGNOSIS — N13.8 BPH WITH OBSTRUCTION/LOWER URINARY TRACT SYMPTOMS: ICD-10-CM

## 2023-01-17 DIAGNOSIS — N40.1 BPH WITH OBSTRUCTION/LOWER URINARY TRACT SYMPTOMS: ICD-10-CM

## 2023-01-17 PROBLEM — Z86.010 HISTORY OF COLONIC POLYPS: Status: RESOLVED | Noted: 2020-03-19 | Resolved: 2023-01-17

## 2023-01-17 PROBLEM — Z86.0100 HX OF COLONIC POLYPS: Status: RESOLVED | Noted: 2017-03-29 | Resolved: 2023-01-17

## 2023-01-17 PROBLEM — Z86.0100 HISTORY OF COLONIC POLYPS: Status: RESOLVED | Noted: 2020-03-19 | Resolved: 2023-01-17

## 2023-01-17 PROBLEM — G56.91 NEUROPATHY, ARM, RIGHT: Status: RESOLVED | Noted: 2020-07-27 | Resolved: 2023-01-17

## 2023-01-17 PROBLEM — E03.9 ACQUIRED HYPOTHYROIDISM: Chronic | Status: ACTIVE | Noted: 2020-03-19

## 2023-01-17 PROBLEM — D70.9 NEUTROPENIA: Status: RESOLVED | Noted: 2020-03-19 | Resolved: 2023-01-17

## 2023-01-17 PROBLEM — M54.2 NECK PAIN: Status: RESOLVED | Noted: 2020-03-19 | Resolved: 2023-01-17

## 2023-01-17 PROBLEM — E78.5 HYPERLIPIDEMIA: Chronic | Status: ACTIVE | Noted: 2020-03-19

## 2023-01-17 PROBLEM — I87.2 VENOUS INSUFFICIENCY: Chronic | Status: ACTIVE | Noted: 2023-01-17

## 2023-01-17 PROBLEM — Z86.010 HX OF COLONIC POLYPS: Status: RESOLVED | Noted: 2017-03-29 | Resolved: 2023-01-17

## 2023-01-17 PROBLEM — F32.4 MAJOR DEPRESSIVE DISORDER WITH SINGLE EPISODE, IN PARTIAL REMISSION: Status: RESOLVED | Noted: 2020-03-19 | Resolved: 2023-01-17

## 2023-01-17 LAB
EXPIRATION DATE: NORMAL
FLUAV AG NPH QL: NEGATIVE
FLUBV AG NPH QL: NEGATIVE
HBA1C MFR BLD: 6.6 %
INTERNAL CONTROL: NORMAL
Lab: NORMAL

## 2023-01-17 PROCEDURE — 87804 INFLUENZA ASSAY W/OPTIC: CPT | Performed by: INTERNAL MEDICINE

## 2023-01-17 PROCEDURE — 99214 OFFICE O/P EST MOD 30 MIN: CPT | Performed by: INTERNAL MEDICINE

## 2023-01-17 PROCEDURE — 3046F HEMOGLOBIN A1C LEVEL >9.0%: CPT | Performed by: INTERNAL MEDICINE

## 2023-01-17 PROCEDURE — 3044F HG A1C LEVEL LT 7.0%: CPT | Performed by: INTERNAL MEDICINE

## 2023-01-17 PROCEDURE — 3051F HG A1C>EQUAL 7.0%<8.0%: CPT | Performed by: INTERNAL MEDICINE

## 2023-01-17 PROCEDURE — 96372 THER/PROPH/DIAG INJ SC/IM: CPT | Performed by: INTERNAL MEDICINE

## 2023-01-17 PROCEDURE — G0127 TRIM NAIL(S): HCPCS | Performed by: INTERNAL MEDICINE

## 2023-01-17 PROCEDURE — 83036 HEMOGLOBIN GLYCOSYLATED A1C: CPT | Performed by: INTERNAL MEDICINE

## 2023-01-17 PROCEDURE — 3052F HG A1C>EQUAL 8.0%<EQUAL 9.0%: CPT | Performed by: INTERNAL MEDICINE

## 2023-01-17 RX ORDER — ASPIRIN 81 MG/1
81 TABLET, CHEWABLE ORAL DAILY
COMMUNITY

## 2023-01-17 RX ORDER — ALPRAZOLAM 0.5 MG/1
0.5 TABLET ORAL NIGHTLY PRN
Qty: 30 TABLET | Refills: 2 | Status: CANCELLED | OUTPATIENT
Start: 2023-01-17

## 2023-01-17 RX ORDER — ALPRAZOLAM 0.5 MG/1
0.5 TABLET ORAL NIGHTLY PRN
Qty: 30 TABLET | Refills: 2 | Status: SHIPPED | OUTPATIENT
Start: 2023-02-22

## 2023-01-17 RX ORDER — METHYLPREDNISOLONE SODIUM SUCCINATE 40 MG/ML
40 INJECTION, POWDER, LYOPHILIZED, FOR SOLUTION INTRAMUSCULAR; INTRAVENOUS ONCE
Status: COMPLETED | OUTPATIENT
Start: 2023-01-17 | End: 2023-01-17

## 2023-01-17 RX ADMIN — METHYLPREDNISOLONE SODIUM SUCCINATE 40 MG: 40 INJECTION, POWDER, LYOPHILIZED, FOR SOLUTION INTRAMUSCULAR; INTRAVENOUS at 09:01

## 2023-01-17 NOTE — ASSESSMENT & PLAN NOTE
Patient follows with Dr. Post.  Patient has tried tadalafil both 10 mg and 20 mg without any success.  Discussed with him that there are other medications that he can try and discussed with Dr. Post.  Patient is not interested in any surgical intervention, but I did try to encourage him to ask about other medication options.   - Venous duplex - L AVELINA decreased at 0.63, L TBI decreased at 0.33 - indicative of arterial disease, likely  - S/p angiogram/angioplasty - Venous duplex - L AVELINA decreased at 0.63, L TBI decreased at 0.33 - indicative of arterial disease, likely  - S/p angiogram/angioplasty  - Advised to follow-up with vascular surgery outpatient

## 2023-01-17 NOTE — ASSESSMENT & PLAN NOTE
Hypertension is worsening.  Continue current treatment regimen.  Dietary sodium restriction.  Regular aerobic exercise.  Continue current medications.  Medication changes per orders.  Ambulatory blood pressure monitoring.  Blood pressure will be reassessed at the next regular appointment.    Blood pressure target is less than 130/80.

## 2023-01-17 NOTE — ASSESSMENT & PLAN NOTE
Symptoms greatly improved since the patient underwent procedure with Dr. Post.  Patient is presently stable and having a lot less issues.  Patient sounds as though he had a TURP.

## 2023-01-17 NOTE — PROGRESS NOTES
Chief Complaint  Hypertension (3 month follow up ), Diabetes (A1c:  6.6), Sore Throat, and Nasal Congestion    Subjective        Caleb Wood presents to [facility]    HPI    Patient is seen for the above problems.  Patient also had several other concerns that were brought up.  Discussed some of this down in the assessment and plan section.  Overall the patient is very active for an 82-year-old and has been doing well.  Patient has a high school friend that he is now seeing, and indicates that they are traveling soon.  He indicates that he has had some difficulty with erectile dysfunction despite trying sildenafil.  He indicates that he tried both 10 mg and 20 mg.  He indicates that it did not do anything.  I discussed with him that there are other options and he can consider talking about these options with Dr. Post as he is already established with Dr. Post.  Patient voices he does not want an implant, but may be interested in other medications.    Patient's blood pressure has been well controlled.  Patient denies any chest pain or shortness of breath.    Patient's diabetes has been much better controlled.  Patient has been more compliant with diet.  Patient has not had any low blood sugars, indicates lowest that he has had has been 88 and that was a couple weeks ago.    Patient has for the last 3 to 4 days had sinus congestion and nasal drainage.  Patient has tried some over-the-counter medications with some success.  Upon examination, the patient has significant amount of postnasal drip that is easily visible in the back of his throat as well as cobblestoning of the back of his throat.    Patient has issues with venous insufficiency, he has seen vascular surgery, these notes were reviewed.  Patient was unable to tolerate compression socks, and he was getting the 20/30 mmHg, we will try 15/20.    Review of Systems   HENT: Positive for congestion, postnasal drip and sinus  "pressure.    Respiratory: Negative for cough and shortness of breath.    Cardiovascular: Positive for leg swelling. Negative for chest pain.   Musculoskeletal: Negative for arthralgias and myalgias.   Neurological: Negative for weakness.       Objective   Vital Signs:  /80 (BP Location: Left arm, Patient Position: Sitting, Cuff Size: Adult)   Pulse 80   Temp 97.9 °F (36.6 °C) (Temporal)   Ht 193 cm (76\")   Wt 97.5 kg (215 lb)   SpO2 99%   BMI 26.17 kg/m²   Estimated body mass index is 26.17 kg/m² as calculated from the following:    Height as of this encounter: 193 cm (76\").    Weight as of this encounter: 97.5 kg (215 lb).      Physical Exam  HENT:      Mouth/Throat:      Mouth: Mucous membranes are dry.      Pharynx: Oropharynx is clear.      Comments: Post nasal drainage; boggy nasal turbinates; cobblestoning posterior oropharynx  Eyes:      General: No scleral icterus.     Conjunctiva/sclera: Conjunctivae normal.   Cardiovascular:      Rate and Rhythm: Normal rate and regular rhythm.      Heart sounds: Murmur heard.   Pulmonary:      Effort: Pulmonary effort is normal. No respiratory distress.      Breath sounds: Normal breath sounds. No stridor.   Abdominal:      General: Abdomen is flat. Bowel sounds are normal.   Musculoskeletal:      Right lower leg: Edema present.      Left lower leg: Edema present.   Skin:     Comments: Dystrophic nails bilateral feet   Neurological:      Mental Status: He is alert.            Diabetic Foot Exam Performed         Assessment and Plan   Diagnoses and all orders for this visit:    1. Sore throat, post-nasal drip (Primary)  -     methylPREDNISolone sodium succinate (SOLU-Medrol) injection 40 mg    2. Controlled type 2 diabetes mellitus without complication, without long-term current use of insulin ([facility])  Assessment & Plan:  Diabetes is improving with treatment.   Reminded to bring in blood sugar diary at next visit.  Dietary recommendations for ADA diet.  Discussed " ways to avoid symptomatic hypoglycemia.  Discussed foot care.  Reminded to get yearly retinal exam.  Medication changes per orders.  Diabetes will be reassessed in 6 months.    A1c has improved significantly down to 6.6.  Patient has not had any significant hypoglycemia, however I do worry about this.  I counseled him on monitoring his sugar and if he has hypoglycemia to drop the glipizide.    Orders:  -     POC Glycated Hemoglobin, Total  -     Lipid panel  -     TSH Rfx On Abnormal To Free T4  -     Comprehensive metabolic panel    3. Situational mixed anxiety and depressive disorder  Assessment & Plan:  Controlled with benzodiazepine.  Patient has been tolerating for some time.  No issues with it.    Orders:  -     [id number] [facility] -  -     ALPRAZolam (XANAX) 0.5 MG tablet; Take 1 tablet by mouth At Night As Needed for Anxiety.  Dispense: 30 tablet; Refill: 2  -     POC Influenza A / B  -     COVID-19,[facility] ROUTINE, NP/OP SWAB IN TRANSPORT MEDIA OR ESWAB 72 HR TAT - Swab, Nasopharynx; Future  -     COVID-19,[facility] ROUTINE, NP/OP SWAB IN TRANSPORT MEDIA OR ESWAB 72 HR TAT - Swab, Nasopharynx    4. Nasal congestion  -     POC Influenza A / B  -     COVID-19,[facility] ROUTINE, NP/OP SWAB IN TRANSPORT MEDIA OR ESWAB 72 HR TAT - Swab, Nasopharynx; Future  -     COVID-19,[facility] ROUTINE, NP/OP SWAB IN TRANSPORT MEDIA OR ESWAB 72 HR TAT - Swab, Nasopharynx  -     methylPREDNISolone sodium succinate (SOLU-Medrol) injection 40 mg    5. Encounter for long term benzodiazepine therapy  -     ALPRAZolam (XANAX) 0.5 MG tablet; Take 1 tablet by mouth At Night As Needed for Anxiety.  Dispense: 30 tablet; Refill: 2    6. Other long term (current) drug therapy  -     [id number] [facility] -    7. Nail dystrophy  Assessment & Plan:  Pending podiatry follow-up.  He attempted to trim but left jagged edges and concern they will dig into feet.  Trimmed nails.    Orders:  -     Toe nail trimming    8. Benign essential  HTN  Assessment & Plan:  Hypertension is worsening.  Continue current treatment regimen.  Dietary sodium restriction.  Regular aerobic exercise.  Continue current medications.  Medication changes per orders.  Ambulatory blood pressure monitoring.  Blood pressure will be reassessed at the next regular appointment.    Blood pressure target is less than 130/80.      9. Pure hypercholesterolemia  Assessment & Plan:  Lipid abnormalities are unchanged.  Pharmacotherapy as ordered.  Lipids will be reassessed in 1 year.    Patient is taking rosuvastatin 5 mg every other day.  Patient is tolerating this okay at this present time      10. BPH with obstruction/lower urinary tract symptoms  Assessment & Plan:  Symptoms greatly improved since the patient underwent procedure with Dr. Post.  Patient is presently stable and having a lot less issues.  Patient sounds as though he had a TURP.      11. Other male erectile dysfunction  Assessment & Plan:  Patient follows with Dr. Post.  Patient has tried tadalafil both 10 mg and 20 mg without any success.  Discussed with him that there are other medications that he can try and discussed with Dr. Post.  Patient is not interested in any surgical intervention, but I did try to encourage him to ask about other medication options.          Result Review :  The following data was reviewed by: Jr Galeana MD on 01/17/2023:  CMP    CMP 4/8/22 7/29/22 10/18/22   Glucose 149 (A) 185 (A) 130 (A)   BUN 17 21 15   Creatinine 1.01 1.07 1.17   eGFR  70.2    Sodium 138 143 140   Potassium 4.0 3.9 5.0   Chloride 103 106 101   Calcium 9.2 8.9 9.7   Total Protein 6.5  6.6   Albumin 4.40  4.70   Globulin 2.1  1.9   Total Bilirubin 0.4  0.6   Alkaline Phosphatase 69  58   AST (SGOT) 15  18   ALT (SGPT) 18  12   BUN/Creatinine Ratio 16.8 19.6 12.8   Anion Gap  9.0    (A) Abnormal value       Comments are available for some flowsheets but are not being displayed.           CBC w/diff    CBC w/Diff  4/8/22 7/29/22   WBC 5.66 5.27   RBC 4.35 3.85 (A)   Hemoglobin 13.8 12.0 (A)   Hematocrit 42.6 37.9   MCV 97.9 (A) 98.4 (A)   MCH 31.7 31.2   MCHC 32.4 31.7   RDW 11.9 (A) 12.3   Platelets 168 199   Neutrophil Rel % 59.1    Lymphocyte Rel % 22.3    Monocyte Rel % 8.0    Eosinophil Rel % 9.7 (A)    Basophil Rel % 0.7    (A) Abnormal value            Lipid Panel    Lipid Panel 4/8/22 10/18/22   Total Cholesterol 167 151   Triglycerides 190 (A) 155 (A)   HDL Cholesterol 37 (A) 33 (A)   VLDL Cholesterol 33 27   LDL Cholesterol  97 91   (A) Abnormal value       Comments are available for some flowsheets but are not being displayed.                          Toe nail trimming    Date/Time: 1/17/2023 1:00 PM  Performed by: Jr Galeana MD  Authorized by: Jr Galeana MD   Patient tolerance: patient tolerated the procedure well with no immediate complications  Comments: Toe nail clippers were used to trim dystrophic nails to keep them from digging into other toes as well as some were growing over end of toe.  Patient is due to podiatry appointment who will further take care of toe nails.                 Follow Up   Return in about 3 months (around 4/17/2023), or if symptoms worsen or fail to improve, for Recheck, Annual physical.  Patient was given instructions and counseling regarding his condition or for health maintenance advice. Please see specific information pulled into the AVS if appropriate.       INDY Galeana MD, FACP, [facility]      Electronically signed by Jr Galeana MD, 01/17/23, 1:00 PM CST.

## 2023-01-17 NOTE — ASSESSMENT & PLAN NOTE
Lipid abnormalities are unchanged.  Pharmacotherapy as ordered.  Lipids will be reassessed in 1 year.    Patient is taking rosuvastatin 5 mg every other day.  Patient is tolerating this okay at this present time

## 2023-01-17 NOTE — ASSESSMENT & PLAN NOTE
Pending podiatry follow-up.  He attempted to trim but left jagged edges and concern they will dig into feet.  Trimmed nails.

## 2023-01-17 NOTE — ASSESSMENT & PLAN NOTE
Diabetes is improving with treatment.   Reminded to bring in blood sugar diary at next visit.  Dietary recommendations for ADA diet.  Discussed ways to avoid symptomatic hypoglycemia.  Discussed foot care.  Reminded to get yearly retinal exam.  Medication changes per orders.  Diabetes will be reassessed in 6 months.    A1c has improved significantly down to 6.6.  Patient has not had any significant hypoglycemia, however I do worry about this.  I counseled him on monitoring his sugar and if he has hypoglycemia to drop the glipizide.

## 2023-01-18 LAB
LABCORP SARS-COV-2, NAA 2 DAY TAT: NORMAL
SARS-COV-2 RNA RESP QL NAA+PROBE: NOT DETECTED

## 2023-01-21 LAB
ALBUMIN SERPL-MCNC: 4.4 G/DL (ref 3.5–5.2)
ALBUMIN/GLOB SERPL: 1.9 G/DL
ALP SERPL-CCNC: 70 U/L (ref 39–117)
ALT SERPL-CCNC: 13 U/L (ref 1–41)
AMPHETAMINES UR QL SCN: NEGATIVE NG/ML
AST SERPL-CCNC: 14 U/L (ref 1–40)
BARBITURATES UR QL SCN: NEGATIVE NG/ML
BENZODIAZ UR QL: NEGATIVE
BILIRUB SERPL-MCNC: 0.5 MG/DL (ref 0–1.2)
BUN SERPL-MCNC: 16 MG/DL (ref 8–23)
BUN/CREAT SERPL: 13.2 (ref 7–25)
BZE UR QL: NEGATIVE NG/ML
CALCIUM SERPL-MCNC: 9.5 MG/DL (ref 8.6–10.5)
CANNABINOIDS UR QL SCN: NEGATIVE NG/ML
CHLORIDE SERPL-SCNC: 101 MMOL/L (ref 98–107)
CHOLEST SERPL-MCNC: 158 MG/DL (ref 0–200)
CO2 SERPL-SCNC: 28.2 MMOL/L (ref 22–29)
CREAT SERPL-MCNC: 1.21 MG/DL (ref 0.76–1.27)
EGFRCR SERPLBLD CKD-EPI 2021: 60.2 ML/MIN/1.73
GLOBULIN SER CALC-MCNC: 2.3 GM/DL
GLUCOSE SERPL-MCNC: 122 MG/DL (ref 65–99)
HDLC SERPL-MCNC: 36 MG/DL (ref 40–60)
LDLC SERPL CALC-MCNC: 94 MG/DL (ref 0–100)
OPIATES UR QL: NEGATIVE NG/ML
PCP UR QL: NEGATIVE NG/ML
POTASSIUM SERPL-SCNC: 4.3 MMOL/L (ref 3.5–5.2)
PROT SERPL-MCNC: 6.7 G/DL (ref 6–8.5)
SODIUM SERPL-SCNC: 140 MMOL/L (ref 136–145)
TRIGL SERPL-MCNC: 161 MG/DL (ref 0–150)
TSH SERPL DL<=0.005 MIU/L-ACNC: 4.27 UIU/ML (ref 0.27–4.2)
VLDLC SERPL CALC-MCNC: 28 MG/DL (ref 5–40)

## 2023-02-06 ENCOUNTER — OFFICE VISIT (OUTPATIENT)
Dept: INTERNAL MEDICINE | Facility: CLINIC | Age: 83
End: 2023-02-06
Payer: MEDICARE

## 2023-02-06 VITALS
RESPIRATION RATE: 18 BRPM | BODY MASS INDEX: 26.18 KG/M2 | TEMPERATURE: 98.2 F | SYSTOLIC BLOOD PRESSURE: 154 MMHG | WEIGHT: 215 LBS | DIASTOLIC BLOOD PRESSURE: 84 MMHG | OXYGEN SATURATION: 97 % | HEART RATE: 65 BPM | HEIGHT: 76 IN

## 2023-02-06 DIAGNOSIS — J06.9 UPPER RESPIRATORY TRACT INFECTION, UNSPECIFIED TYPE: Primary | ICD-10-CM

## 2023-02-06 DIAGNOSIS — G47.00 INSOMNIA, UNSPECIFIED TYPE: ICD-10-CM

## 2023-02-06 PROCEDURE — 99213 OFFICE O/P EST LOW 20 MIN: CPT

## 2023-02-06 RX ORDER — AZITHROMYCIN 250 MG/1
TABLET, FILM COATED ORAL
Qty: 6 TABLET | Refills: 0 | Status: SHIPPED | OUTPATIENT
Start: 2023-02-06 | End: 2023-03-28

## 2023-02-06 RX ORDER — TRAZODONE HYDROCHLORIDE 50 MG/1
25 TABLET ORAL NIGHTLY
Qty: 15 TABLET | Refills: 0 | Status: SHIPPED | OUTPATIENT
Start: 2023-02-06 | End: 2023-02-16

## 2023-02-06 RX ORDER — GUAIFENESIN 600 MG/1
1200 TABLET, EXTENDED RELEASE ORAL 2 TIMES DAILY
Qty: 56 TABLET | Refills: 0 | Status: SHIPPED | OUTPATIENT
Start: 2023-02-06 | End: 2023-02-20

## 2023-02-15 RX ORDER — GLIPIZIDE 2.5 MG/1
2.5 TABLET, EXTENDED RELEASE ORAL DAILY
Qty: 30 TABLET | Refills: 11 | Status: SHIPPED | OUTPATIENT
Start: 2023-02-15

## 2023-02-15 NOTE — TELEPHONE ENCOUNTER
Caller: Caleb Wood    Relationship: Self    Best call back number: [phone]    Requested Prescriptions:   Requested Prescriptions     Pending Prescriptions Disp Refills   • glipizide (Glucotrol XL) 2.5 MG 24 hr tablet 30 tablet 11     Sig: Take 1 tablet by mouth Daily.        Pharmacy where request should be sent: [facility] [address] [place] [address] [phone] [facility] [phone] [facility]     Does the patient have less than a 3 day supply:  [x] Yes  [] No    Would you like a call back once the refill request has been completed: [x] Yes [] No    If the office needs to give you a call back, can they leave a voicemail: [x] Yes [] No    Cindy Carr Rep   02/15/23 14:40 CST

## 2023-03-07 ENCOUNTER — TELEPHONE (OUTPATIENT)
Dept: UROLOGY | Facility: CLINIC | Age: 83
End: 2023-03-07
Payer: MEDICARE

## 2023-03-07 RX ORDER — LEVOTHYROXINE SODIUM 0.05 MG/1
TABLET ORAL
Qty: 90 TABLET | Refills: 3 | Status: SHIPPED | OUTPATIENT
Start: 2023-03-07

## 2023-03-07 NOTE — TELEPHONE ENCOUNTER
Caller: JJ    Relationship: Provider    Best call back number: [phone]    What form or medical record are you requesting: SIGNATURE ON FORM    Who is requesting this form or medical record from you: AGUEDA    How would you like to receive the form or medical records (pick-up, mail, fax):  FAX# [phone]    Timeframe paperwork needed: 3/8/23    Additional notes: JJ FAXED OVER FORM, NEEDS TO BE SIGNED BY DR WILKERSON UNDER PROVIDER SECTION.  DR WILKERSON SIGNED FORM BUT IT WAS IN THE PATIENT SIGNATURE LINE.

## 2023-03-09 DIAGNOSIS — G89.29 CHRONIC LOW BACK PAIN, UNSPECIFIED BACK PAIN LATERALITY, UNSPECIFIED WHETHER SCIATICA PRESENT: ICD-10-CM

## 2023-03-09 DIAGNOSIS — M54.50 CHRONIC LOW BACK PAIN, UNSPECIFIED BACK PAIN LATERALITY, UNSPECIFIED WHETHER SCIATICA PRESENT: ICD-10-CM

## 2023-03-09 RX ORDER — CYCLOBENZAPRINE HCL 10 MG
10 TABLET ORAL 3 TIMES DAILY PRN
Qty: 30 TABLET | Refills: 0 | Status: SHIPPED | OUTPATIENT
Start: 2023-03-09

## 2023-03-09 RX ORDER — TRAMADOL HYDROCHLORIDE 50 MG/1
50 TABLET ORAL DAILY PRN
Qty: 30 TABLET | Refills: 0 | Status: SHIPPED | OUTPATIENT
Start: 2023-03-09

## 2023-03-09 NOTE — TELEPHONE ENCOUNTER
Caller: Caleb Wood    Relationship: Self    Best call back number:[phone]    Requested Prescriptions:   Requested Prescriptions     Pending Prescriptions Disp Refills   • traMADol (ULTRAM) 50 MG tablet 30 tablet 0     Sig: Take 1 tablet by mouth Daily As Needed for Moderate Pain.   • cyclobenzaprine (FLEXERIL) 10 MG tablet 30 tablet 1     Sig: Take 1 tablet by mouth 3 (Three) Times a Day As Needed for Muscle Spasms.        Pharmacy where request should be sent: [address] [phone] [facility] [phone]      Additional details provided by patient: ONLY WANTS A MONTHS SUPPLY, ONLY TAKES THIS MEDICATION AS NEEDED    Does the patient have less than a 3 day supply:  [x] Yes  [] No    Would you like a call back once the refill request has been completed: [x] Yes [] No    If the office needs to give you a call back, can they leave a voicemail: [x] Yes [] No    Cindy Carr Rep   03/09/23 10:11 CST

## 2023-03-10 ENCOUNTER — TELEPHONE (OUTPATIENT)
Dept: UROLOGY | Facility: CLINIC | Age: 83
End: 2023-03-10
Payer: MEDICARE

## 2023-03-10 NOTE — TELEPHONE ENCOUNTER
Sonido from [employer] called stating that they have received the paperwork, but Dr. Post signed it on the patient side, not provider.  I informed him you are out of office today and Dr. Post is on vacation.    Message sent to Vani VERDUGO MA

## 2023-03-28 ENCOUNTER — HOSPITAL ENCOUNTER (OUTPATIENT)
Dept: CT IMAGING | Facility: HOSPITAL | Age: 83
Discharge: HOME | End: 2023-03-28
Payer: MEDICARE

## 2023-03-28 ENCOUNTER — OFFICE VISIT (OUTPATIENT)
Dept: INTERNAL MEDICINE | Facility: CLINIC | Age: 83
End: 2023-03-28
Payer: MEDICARE

## 2023-03-28 ENCOUNTER — TELEPHONE (OUTPATIENT)
Dept: INTERNAL MEDICINE | Facility: CLINIC | Age: 83
End: 2023-03-28

## 2023-03-28 VITALS
OXYGEN SATURATION: 99 % | BODY MASS INDEX: 26.3 KG/M2 | HEART RATE: 75 BPM | TEMPERATURE: 97.3 F | HEIGHT: 76 IN | DIASTOLIC BLOOD PRESSURE: 90 MMHG | WEIGHT: 216 LBS | RESPIRATION RATE: 18 BRPM | SYSTOLIC BLOOD PRESSURE: 174 MMHG

## 2023-03-28 DIAGNOSIS — R10.9 ACUTE RIGHT FLANK PAIN: Primary | ICD-10-CM

## 2023-03-28 DIAGNOSIS — R10.9 ACUTE RIGHT FLANK PAIN: ICD-10-CM

## 2023-03-28 DIAGNOSIS — R10.30 LOWER ABDOMINAL PAIN: ICD-10-CM

## 2023-03-28 PROCEDURE — 1159F MED LIST DOCD IN RCRD: CPT

## 2023-03-28 PROCEDURE — 3077F SYST BP >= 140 MM HG: CPT

## 2023-03-28 PROCEDURE — 3080F DIAST BP >= 90 MM HG: CPT

## 2023-03-28 PROCEDURE — 1160F RVW MEDS BY RX/DR IN RCRD: CPT

## 2023-03-28 PROCEDURE — 74176 CT ABD & PELVIS W/O CONTRAST: CPT

## 2023-03-28 PROCEDURE — 99213 OFFICE O/P EST LOW 20 MIN: CPT

## 2023-03-28 PROCEDURE — 81003 URINALYSIS AUTO W/O SCOPE: CPT

## 2023-03-28 NOTE — PROGRESS NOTES
Subjective     Chief Complaint:  Right flank pain    HPI:  Patient presents today with complaints of right flank pain.  He does have a history of chronic low back pain and takes tramadol and cyclobenzaprine.  He states that his pain started on 3/24 or 3/25.  He states that he did not go to [employer] on Sunday morning because of the pain.  He took a tramadol and cyclobenzaprine and was able to go to [employer] on Sunday night.  However, the pain has persisted.  It is localized to his right flank area.  It is tender to palpation.  He also complains of lower abdominal pain that is not tender to palpation.  He states that he has had kidney stones previously on 3 different occasions.  He states that he does not drink as much water as he should and has been drinking more tea.  He denies dysuria or urinary frequency.  Urinalysis obtained in the clinic was unremarkable.  He denies hematuria or fever.    Past Medical History:   Past Medical History:   Diagnosis Date   • Diabetes mellitus (HCC)     Type 2   • Disease of thyroid gland    • GERD (gastroesophageal reflux disease)    • Hemorrhoids    • Hx of colonic polyps    • Hypercholesteremia      Past Surgical History:  Past Surgical History:   Procedure Laterality Date   • CATARACT EXTRACTION Bilateral    • CHOLECYSTECTOMY     • COLONOSCOPY  01/17/2012    Colon polyp tissue not retrieved repeat exam in 5 years   • COLONOSCOPY  01/06/2009    adenomatous polyp x1   • COLONOSCOPY N/A 06/01/2017    Tubular adenoma Transvere colon, Diverticulosis repeat exam in 5 years   • COLONOSCOPY N/A 07/20/2022    Fair prep, 3 Tubular adenomas ascending colon and at 60 cm, tics repeat exam in 1 year   • CYSTOSCOPY TRANSURETHRAL RESECTION OF PROSTATE N/A 08/02/2022    Procedure: CYSTOSCOPY TRANSURETHRAL RESECTION OF PROSTATE;  Surgeon: López Post MD;  Location: [facility];  Service: Urology;  Laterality: N/A;   • ENDOSCOPY  06/25/2013       • ENDOSCOPY N/A 01/29/2021    Multiple  fundic gland polyps    • KNEE SURGERY     • SHOULDER SURGERY Right    • TONSILLECTOMY         Allergies:  Allergies   Allergen Reactions   • Aleve [Naproxen] Hallucinations   • Tape Rash     Surgical tape     Medications:  Prior to Admission medications    Medication Sig Start Date End Date Taking? Authorizing Provider   acetaminophen (TYLENOL) 500 MG tablet Take 500 mg by mouth Every 6 (Six) Hours As Needed for Mild Pain .    Farrukh Farias MD   ALPRAZolam (XANAX) 0.5 MG tablet Take 1 tablet by mouth At Night As Needed for Anxiety. 2/22/23   Jr Galeana MD   aluminum hydroxide-mag carbonate (GAVISCON EXTRA RELIEF) 160-105 MG chewable tablet chewable tablet Chew Daily As Needed.    Farrukh Farias MD   aspirin 81 MG chewable tablet Chew 81 mg Daily. Takes every other day.    Farrukh Farias MD   azithromycin (Zithromax Z-Daniel) 250 MG tablet Take 2 tablets the first day, then 1 tablet daily for 4 days. 2/6/23   Lala Hauser APRN   coenzyme Q10 100 MG capsule Take 200 mg by mouth Every Evening.    Farrukh Farias MD   cyclobenzaprine (FLEXERIL) 10 MG tablet Take 1 tablet by mouth 3 (Three) Times a Day As Needed for Muscle Spasms. 3/9/23   Jr Galeana MD   diclofenac (VOLTAREN) 1 % gel gel Apply 4 g topically to the appropriate area as directed 4 (Four) Times a Day As Needed. 1-3 times daily    Farrukh Farias MD   esomeprazole (nexIUM) 40 MG capsule Take 1 capsule by mouth Daily. 12/5/22   Deepti Brito APRN   glipizide (Glucotrol XL) 2.5 MG 24 hr tablet Take 1 tablet by mouth Daily. 2/15/23   Jr Galeana MD   glucose blood (Contour Test) test strip 1 each by Other route Daily. Use as instructed 2/10/22   Latia Gonzalez APRN   ibuprofen (ADVIL,MOTRIN) 200 MG tablet Take 200 mg by mouth Every 8 (Eight) Hours As Needed for Mild Pain .    Farrukh Farias MD   levothyroxine (SYNTHROID, LEVOTHROID) 50 MCG tablet Take 1 tablet by mouth once daily  "3/7/23   Wilma Ritu Ayanna   metFORMIN ER (GLUCOPHAGE-XR) 750 MG 24 hr tablet Take 1 tablet by mouth 2 (Two) Times a Day. 6/16/22   Wilma Ritu Ayanna   rosuvastatin (CRESTOR) 5 MG tablet Take 1 tablet by mouth Every Other Day. 12/28/22   Jory Aggarwal APRN   traMADol (ULTRAM) 50 MG tablet Take 1 tablet by mouth Daily As Needed for Moderate Pain. 3/9/23   Jr Galeana MD       Objective     Vital Signs: /90 (BP Location: Left arm, Patient Position: Sitting, Cuff Size: Adult)   Pulse 75   Temp 97.3 °F (36.3 °C) (Infrared)   Resp 18   Ht 193 cm (76\")   Wt 98 kg (216 lb)   SpO2 99%   BMI 26.29 kg/m²   Physical Exam  Vitals and nursing note reviewed.   Constitutional:       General: He is not in acute distress.     Appearance: Normal appearance.   HENT:      Head: Normocephalic.   Cardiovascular:      Rate and Rhythm: Normal rate and regular rhythm.      Pulses: Normal pulses.      Heart sounds: Normal heart sounds. No murmur heard.  Pulmonary:      Effort: Pulmonary effort is normal. No respiratory distress.      Breath sounds: Normal breath sounds. No wheezing.   Abdominal:      General: Bowel sounds are normal. There is no distension.      Palpations: Abdomen is soft.      Tenderness: There is no abdominal tenderness. There is no guarding.   Musculoskeletal:         General: Tenderness (right flank area) present. No swelling. Normal range of motion.      Cervical back: Normal range of motion.      Right lower leg: No edema.      Left lower leg: No edema.   Skin:     General: Skin is warm and dry.      Capillary Refill: Capillary refill takes less than 2 seconds.      Findings: No bruising, lesion or rash.   Neurological:      Mental Status: He is alert and oriented to person, place, and time. Mental status is at baseline.      Motor: No weakness.       Results Reviewed:  Reviewed note from office visit with Dr. Galeana on 1/17/2023.  Reviewed CMP obtained on 1/17/2023.    Assessment / " Plan     Assessment/Plan:  Diagnoses and all orders for this visit:    1. Acute right flank pain (Primary)  -     CT Abdomen Pelvis Without Contrast; Future  -     POC Urinalysis Dipstick, Multipro    2. Lower abdominal pain       Patient did ask that I discuss his case with Dr. Galeana prior to ordering any imaging.  Urinalysis in the clinic was unremarkable.  We will proceed with CT of the abdomen and pelvis to assess for possible nephrolithiasis since the patient does have a history of kidney stones.  If imaging is negative, would consider possible imaging of lower back or treatment of muscular low back pain.  However, this would not explain his lower abdominal pain.    He briefly mentioned that he also has breast tenderness but denies discharge or swelling of breast tissue.  We discussed that most causes of male breast tenderness are usually benign. Will address acute complaints at this time and follow up on breast tenderness. He does have a follow up appointment scheduled in 2 weeks.    Return for Next scheduled follow up. unless patient needs to be seen sooner or acute issues arise.    I have discussed the patient results/orders and and plan/recommendation with them at today's visit.      Trina Post, APRN   03/28/2023

## 2023-03-28 NOTE — TELEPHONE ENCOUNTER
Caller: Caleb Wood    Relationship: Self    Best call back number: [phone]    What orders are you requesting (i.e. lab or imaging): XRAYS    In what timeframe would the patient need to come in: AS SOON AS POSSIBLE    Where will you receive your lab/imaging services: [facility]

## 2023-04-12 ENCOUNTER — OFFICE VISIT (OUTPATIENT)
Dept: INTERNAL MEDICINE | Facility: CLINIC | Age: 83
End: 2023-04-12
Payer: MEDICARE

## 2023-04-12 VITALS
WEIGHT: 215 LBS | OXYGEN SATURATION: 99 % | BODY MASS INDEX: 26.18 KG/M2 | HEART RATE: 78 BPM | SYSTOLIC BLOOD PRESSURE: 142 MMHG | HEIGHT: 76 IN | DIASTOLIC BLOOD PRESSURE: 78 MMHG | TEMPERATURE: 97.6 F

## 2023-04-12 DIAGNOSIS — E11.9 CONTROLLED TYPE 2 DIABETES MELLITUS WITHOUT COMPLICATION, WITHOUT LONG-TERM CURRENT USE OF INSULIN: Primary | Chronic | ICD-10-CM

## 2023-04-12 DIAGNOSIS — G89.29 CHRONIC LOW BACK PAIN, UNSPECIFIED BACK PAIN LATERALITY, UNSPECIFIED WHETHER SCIATICA PRESENT: ICD-10-CM

## 2023-04-12 DIAGNOSIS — M54.50 CHRONIC BILATERAL LOW BACK PAIN WITHOUT SCIATICA: ICD-10-CM

## 2023-04-12 DIAGNOSIS — I10 BENIGN ESSENTIAL HTN: Chronic | ICD-10-CM

## 2023-04-12 DIAGNOSIS — N40.1 BPH WITH OBSTRUCTION/LOWER URINARY TRACT SYMPTOMS: Chronic | ICD-10-CM

## 2023-04-12 DIAGNOSIS — G89.29 CHRONIC BILATERAL LOW BACK PAIN WITHOUT SCIATICA: ICD-10-CM

## 2023-04-12 DIAGNOSIS — N13.8 BPH WITH OBSTRUCTION/LOWER URINARY TRACT SYMPTOMS: Chronic | ICD-10-CM

## 2023-04-12 DIAGNOSIS — M54.50 CHRONIC LOW BACK PAIN, UNSPECIFIED BACK PAIN LATERALITY, UNSPECIFIED WHETHER SCIATICA PRESENT: ICD-10-CM

## 2023-04-12 DIAGNOSIS — E78.00 PURE HYPERCHOLESTEROLEMIA: Chronic | ICD-10-CM

## 2023-04-12 DIAGNOSIS — I87.2 VENOUS INSUFFICIENCY: Chronic | ICD-10-CM

## 2023-04-12 DIAGNOSIS — E03.9 ACQUIRED HYPOTHYROIDISM: Chronic | ICD-10-CM

## 2023-04-12 LAB — HBA1C MFR BLD: 7.1 %

## 2023-04-12 PROCEDURE — 3077F SYST BP >= 140 MM HG: CPT | Performed by: INTERNAL MEDICINE

## 2023-04-12 PROCEDURE — 83036 HEMOGLOBIN GLYCOSYLATED A1C: CPT | Performed by: INTERNAL MEDICINE

## 2023-04-12 PROCEDURE — 3078F DIAST BP <80 MM HG: CPT | Performed by: INTERNAL MEDICINE

## 2023-04-12 PROCEDURE — 3051F HG A1C>EQUAL 7.0%<8.0%: CPT | Performed by: INTERNAL MEDICINE

## 2023-04-12 PROCEDURE — 99214 OFFICE O/P EST MOD 30 MIN: CPT | Performed by: INTERNAL MEDICINE

## 2023-04-12 RX ORDER — TRAMADOL HYDROCHLORIDE 50 MG/1
50 TABLET ORAL DAILY PRN
Qty: 30 TABLET | Refills: 0 | Status: SHIPPED | OUTPATIENT
Start: 2023-04-12

## 2023-04-12 RX ORDER — LOSARTAN POTASSIUM 25 MG/1
25 TABLET ORAL DAILY
Qty: 30 TABLET | Refills: 2 | Status: SHIPPED | OUTPATIENT
Start: 2023-04-12 | End: 2023-06-22

## 2023-04-12 NOTE — PROGRESS NOTES
"      Chief Complaint  Hypertension (Next appointment needs to be annual, pt is 3 days to early ), Diabetes (A1C: 7.1), and Sore Throat (Throat irration, has been having a lot of drainage the last few days)    Subjective        Caleb Wood presents to [facility]    HPI  Patient seen for above problems.  Patient brought her blood pressure log, consistent with elevated blood pressures.    Review of Systems    Objective   Vital Signs:  /78 (BP Location: Left arm, Patient Position: Sitting, Cuff Size: Adult)   Pulse 78   Temp 97.6 °F (36.4 °C) (Temporal)   Ht 193 cm (76\")   Wt 97.5 kg (215 lb)   SpO2 99%   BMI 26.17 kg/m²   Estimated body mass index is 26.17 kg/m² as calculated from the following:    Height as of this encounter: 193 cm (76\").    Weight as of this encounter: 97.5 kg (215 lb).      Physical Exam  Vitals reviewed.   Constitutional:       Appearance: He is not ill-appearing.   Cardiovascular:      Rate and Rhythm: Normal rate.      Heart sounds: Murmur heard.   Pulmonary:      Effort: Pulmonary effort is normal. No respiratory distress.   Neurological:      General: No focal deficit present.      Mental Status: He is alert and oriented to person, place, and time.   Psychiatric:         Mood and Affect: Mood normal.         Behavior: Behavior normal.                     Assessment and Plan   Diagnoses and all orders for this visit:    1. Controlled type 2 diabetes mellitus without complication, without long-term current use of insulin (HCC) (Primary)  -     POC Glycated Hemoglobin, Total    2. Chronic low back pain, unspecified back pain laterality, unspecified whether sciatica present  -     traMADol (ULTRAM) 50 MG tablet; Take 1 tablet by mouth Daily As Needed for Moderate Pain.  Dispense: 30 tablet; Refill: 0    3. Venous insufficiency    4. Benign essential HTN  -     losartan (Cozaar) 25 MG tablet; Take 1 tablet by mouth Daily.  Dispense: 30 tablet; Refill: " 2    5. 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 Acquired hypothyroidism    8. Pure hypercholesterolemia        For the patient's blood pressure, the patient's blood pressure has been poorly controlled, we are can start the patient on losartan 25 mg.  Discussed with him that we will eventually want to check his kidney function make sure things looks okay after starting this medication as it can make potassium change.  Also start him on a low-dose, that way we can increase to 50 mg without having to send in a new prescription if we need to.    Patient has a little bit of venous insufficiency, likely related to his elevated blood pressure.  Recommended the patient use compression stockings when able.    BPH stable no issues    Patient has a history of type 2 diabetes.  A1c is 7.1.  Patient just recently returned from a vacation.  No changes to his diabetes medications at this time.  Avoid hypoglycemic episodes.    Continue levothyroxine.  Continue rosuvastatin.  Patient is tolerating his medications well without any significant issues.        Result Review :  The following data was reviewed by: Jr Galeana MD on 04/12/2023:  CMP        7/29/2022    13:13 10/18/2022    07:07 1/17/2023    08:04   CMP   Glucose 185   130   122     BUN 21   15   16     Creatinine 1.07   1.17   1.21     EGFR 70.2       Sodium 143   140   140     Potassium 3.9   5.0   4.3     Chloride 106   101   101     Calcium 8.9   9.7   9.5     Total Protein  6.6   6.7     Albumin  4.70   4.4     Globulin  1.9   2.3     Total Bilirubin  0.6   0.5     Alkaline Phosphatase  58   70     AST (SGOT)  18   14     ALT (SGPT)  12   13     BUN/Creatinine Ratio 19.6   12.8   13.2     Anion Gap 9.0         CBC w/diff        7/29/2022    13:13   CBC w/Diff   WBC 5.27     RBC 3.85     Hemoglobin 12.0     Hematocrit 37.9     MCV 98.4     MCH 31.2     MCHC 31.7     RDW 12.3     Platelets 199        Lipid Panel        10/18/2022    07:07 1/17/2023    08:04   Lipid Panel   Total Cholesterol 151   158     Triglycerides 155   161     HDL Cholesterol 33   36     VLDL Cholesterol 27   28     LDL Cholesterol  91   94       TSH        10/18/2022    07:07 1/17/2023    08:04   TSH   TSH 3.890   4.270       A1C Last 3 Results        10/18/2022    07:07 1/17/2023    08:21 4/12/2023    14:43   HGBA1C Last 3 Results   Hemoglobin A1C 7.30   6.6   7.1                       Follow Up   Return in about 1 month (around 5/12/2023). For BP check since we initiated medication management.  Patient was given instructions and counseling regarding his condition or for health maintenance advice. Please see specific information pulled into the AVS if appropriate.       INDY Galeana MD, FACP, FHM      Electronically signed by Jr Galeana MD, 04/12/23, 2:58 PM CDT.

## 2023-04-16 PROBLEM — R09.82 POST-NASAL DRIP: Status: ACTIVE | Noted: 2023-04-16

## 2023-06-05 ENCOUNTER — PREP FOR SURGERY (OUTPATIENT)
Dept: GASTROENTEROLOGY | Facility: CLINIC | Age: 83
End: 2023-06-05
Payer: MEDICARE

## 2023-06-05 DIAGNOSIS — Z86.010 HX OF COLONIC POLYPS: Primary | ICD-10-CM

## 2023-06-14 PROBLEM — Z86.010 HX OF COLONIC POLYPS: Status: ACTIVE | Noted: 2023-06-14

## 2023-06-14 PROBLEM — Z86.0100 HX OF COLONIC POLYPS: Status: ACTIVE | Noted: 2023-06-14

## 2023-07-12 PROBLEM — Z79.4 TYPE 2 DIABETES MELLITUS WITH HYPERGLYCEMIA, WITH LONG-TERM CURRENT USE OF INSULIN: Status: ACTIVE | Noted: 2017-03-29

## 2023-07-12 PROBLEM — E11.65 TYPE 2 DIABETES MELLITUS WITH HYPERGLYCEMIA, WITH LONG-TERM CURRENT USE OF INSULIN: Status: ACTIVE | Noted: 2017-03-29

## 2023-07-27 ENCOUNTER — TELEPHONE (OUTPATIENT)
Dept: INTERNAL MEDICINE | Facility: CLINIC | Age: 83
End: 2023-07-27
Payer: MEDICARE

## 2023-07-27 DIAGNOSIS — E11.65 TYPE 2 DIABETES MELLITUS WITH HYPERGLYCEMIA, WITH LONG-TERM CURRENT USE OF INSULIN: ICD-10-CM

## 2023-07-27 DIAGNOSIS — Z79.4 TYPE 2 DIABETES MELLITUS WITH HYPERGLYCEMIA, WITH LONG-TERM CURRENT USE OF INSULIN: ICD-10-CM

## 2023-07-27 RX ORDER — CARVEDILOL 25 MG/1
1 TABLET, FILM COATED ORAL DAILY
Qty: 100 EACH | Refills: 3 | Status: CANCELLED | OUTPATIENT
Start: 2023-07-27

## 2023-07-27 RX ORDER — PERPHENAZINE 16 MG/1
1 TABLET, FILM COATED ORAL DAILY
Qty: 100 EACH | Refills: 3 | Status: SHIPPED | OUTPATIENT
Start: 2023-07-27

## 2023-07-27 NOTE — TELEPHONE ENCOUNTER
Caller: Caleb Wood    Relationship: Self    Best call back number: [phone]     Requested Prescriptions:   Requested Prescriptions     Pending Prescriptions Disp Refills    glucose blood (Contour Test) test strip 100 each 3     Si each by Other route Daily. Use as instructed   NEEDS CONTOUR NEXT EZ TEST STRIPS NOT PLAIN CONTOUR     Pharmacy where request should be sent: [facility] [address] [place] [address] [phone] [facility] [phone]      Last office visit with prescribing clinician: 2023   Last telemedicine visit with prescribing clinician: Visit date not found   Next office visit with prescribing clinician: 1/10/2024     Additional details provided by patient: NEEDS CONTOUR NEXT EZ TEST STRIPS NOT PLAIN CONTOUR     Does the patient have less than a 3 day supply:  [x] Yes  [] No        Praveen Najera III, Cindy Rep   23 15:02 CDT

## 2023-08-08 RX ORDER — METFORMIN HYDROCHLORIDE 750 MG/1
TABLET, EXTENDED RELEASE ORAL
Qty: 180 TABLET | Refills: 0 | Status: SHIPPED | OUTPATIENT
Start: 2023-08-08

## 2023-08-09 ENCOUNTER — HOSPITAL ENCOUNTER (OUTPATIENT)
Facility: HOSPITAL | Age: 83
Setting detail: HOSPITAL OUTPATIENT SURGERY
Discharge: HOME | End: 2023-08-09
Attending: INTERNAL MEDICINE | Admitting: INTERNAL MEDICINE
Payer: MEDICARE

## 2023-08-09 ENCOUNTER — ANESTHESIA (OUTPATIENT)
Dept: GASTROENTEROLOGY | Facility: HOSPITAL | Age: 83
End: 2023-08-09
Payer: MEDICARE

## 2023-08-09 ENCOUNTER — ANESTHESIA EVENT (OUTPATIENT)
Dept: GASTROENTEROLOGY | Facility: HOSPITAL | Age: 83
End: 2023-08-09
Payer: MEDICARE

## 2023-08-09 VITALS
RESPIRATION RATE: 14 BRPM | WEIGHT: 210 LBS | HEART RATE: 57 BPM | SYSTOLIC BLOOD PRESSURE: 154 MMHG | OXYGEN SATURATION: 100 % | BODY MASS INDEX: 25.57 KG/M2 | DIASTOLIC BLOOD PRESSURE: 82 MMHG | TEMPERATURE: 97.1 F | HEIGHT: 76 IN

## 2023-08-09 DIAGNOSIS — Z86.010 HX OF COLONIC POLYPS: ICD-10-CM

## 2023-08-09 LAB — GLUCOSE BLDC GLUCOMTR-MCNC: 132 MG/DL (ref 70–130)

## 2023-08-09 PROCEDURE — 25010000002 PROPOFOL 10 MG/ML EMULSION: Performed by: NURSE ANESTHETIST, CERTIFIED REGISTERED

## 2023-08-09 PROCEDURE — 45385 COLONOSCOPY W/LESION REMOVAL: CPT | Performed by: INTERNAL MEDICINE

## 2023-08-09 PROCEDURE — 82948 REAGENT STRIP/BLOOD GLUCOSE: CPT

## 2023-08-09 PROCEDURE — 88305 TISSUE EXAM BY PATHOLOGIST: CPT | Performed by: INTERNAL MEDICINE

## 2023-08-09 RX ORDER — SODIUM CHLORIDE 9 MG/ML
1000 INJECTION, SOLUTION INTRAVENOUS CONTINUOUS
Status: CANCELLED | OUTPATIENT
Start: 2023-08-09

## 2023-08-09 RX ORDER — LIDOCAINE HYDROCHLORIDE 20 MG/ML
INJECTION, SOLUTION EPIDURAL; INFILTRATION; INTRACAUDAL; PERINEURAL AS NEEDED
Status: DISCONTINUED | OUTPATIENT
Start: 2023-08-09 | End: 2023-08-09 | Stop reason: SURG

## 2023-08-09 RX ORDER — LIDOCAINE HYDROCHLORIDE 10 MG/ML
0.5 INJECTION, SOLUTION EPIDURAL; INFILTRATION; INTRACAUDAL; PERINEURAL ONCE AS NEEDED
Status: CANCELLED | OUTPATIENT
Start: 2023-08-09

## 2023-08-09 RX ORDER — PROPOFOL 10 MG/ML
VIAL (ML) INTRAVENOUS AS NEEDED
Status: DISCONTINUED | OUTPATIENT
Start: 2023-08-09 | End: 2023-08-09 | Stop reason: SURG

## 2023-08-09 RX ORDER — SODIUM CHLORIDE 0.9 % (FLUSH) 0.9 %
10 SYRINGE (ML) INJECTION AS NEEDED
Status: CANCELLED | OUTPATIENT
Start: 2023-08-09

## 2023-08-09 RX ORDER — SODIUM CHLORIDE 0.9 % (FLUSH) 0.9 %
10 SYRINGE (ML) INJECTION AS NEEDED
Status: DISCONTINUED | OUTPATIENT
Start: 2023-08-09 | End: 2023-08-09 | Stop reason: HOSPADM

## 2023-08-09 RX ORDER — LIDOCAINE HYDROCHLORIDE 10 MG/ML
0.5 INJECTION, SOLUTION EPIDURAL; INFILTRATION; INTRACAUDAL; PERINEURAL ONCE AS NEEDED
Status: DISCONTINUED | OUTPATIENT
Start: 2023-08-09 | End: 2023-08-09 | Stop reason: HOSPADM

## 2023-08-09 RX ORDER — SODIUM CHLORIDE 9 MG/ML
500 INJECTION, SOLUTION INTRAVENOUS CONTINUOUS PRN
Status: DISCONTINUED | OUTPATIENT
Start: 2023-08-09 | End: 2023-08-09 | Stop reason: HOSPADM

## 2023-08-09 RX ADMIN — SODIUM CHLORIDE 500 ML: 9 INJECTION, SOLUTION INTRAVENOUS at 07:08

## 2023-08-09 RX ADMIN — PROPOFOL INJECTABLE EMULSION 250 MG: 10 INJECTION, EMULSION INTRAVENOUS at 07:47

## 2023-08-09 RX ADMIN — LIDOCAINE HYDROCHLORIDE 80 MG: 20 INJECTION, SOLUTION EPIDURAL; INFILTRATION; INTRACAUDAL; PERINEURAL at 07:47

## 2023-08-09 NOTE — ANESTHESIA PREPROCEDURE EVALUATION
Anesthesia Evaluation     Patient summary reviewed   no history of anesthetic complications:   NPO Solid Status: > 8 hours             Airway   Mallampati: I  No difficulty expected  Dental    (+) upper dentures and lower dentures    Pulmonary - negative pulmonary ROS   (-) asthma, sleep apnea, not a smoker  Cardiovascular   Exercise tolerance: good (4-7 METS)    (+) hypertensionhyperlipidemia      Neuro/Psych  (-) seizures, TIA, CVA  GI/Hepatic/Renal/Endo    (+) GERD, renal disease stones, diabetes mellitus, thyroid problem     Musculoskeletal     Abdominal    Substance History      OB/GYN          Other                      Anesthesia Plan    ASA 2     MAC     intravenous induction     Anesthetic plan, risks, benefits, and alternatives have been provided, discussed and informed consent has been obtained with: patient.    CODE STATUS:

## 2023-08-09 NOTE — H&P
[facility]  Pre Procedure History & Physical    Chief Complaint:   History of polyps    Subjective     HPI:   Here for colonoscopy.  History of polyps    Past Medical History:   Past Medical History:   Diagnosis Date    Diabetes mellitus     Type 2    Disease of thyroid gland     GERD (gastroesophageal reflux disease)     Hemorrhoids     Hx of colonic polyps     Hypercholesteremia        Past Surgical History:  Past Surgical History:   Procedure Laterality Date    CATARACT EXTRACTION Bilateral     CHOLECYSTECTOMY      COLONOSCOPY  01/17/2012    Colon polyp tissue not retrieved repeat exam in 5 years    COLONOSCOPY  01/06/2009    adenomatous polyp x1    COLONOSCOPY N/A 06/01/2017    Tubular adenoma Transvere colon, Diverticulosis repeat exam in 5 years    COLONOSCOPY N/A 07/20/2022    Fair prep, 3 Tubular adenomas ascending colon and at 60 cm, tics repeat exam in 1 year    CYSTOSCOPY TRANSURETHRAL RESECTION OF PROSTATE N/A 08/02/2022    Procedure: CYSTOSCOPY TRANSURETHRAL RESECTION OF PROSTATE;  Surgeon: López Post MD;  Location: [facility];  Service: Urology;  Laterality: N/A;    ENDOSCOPY  06/25/2013        ENDOSCOPY N/A 01/29/2021    Multiple fundic gland polyps     KNEE SURGERY      SHOULDER SURGERY Right     TONSILLECTOMY         Family History:  Family History   Problem Relation Age of Onset    Alzheimer's disease Mother     No Known Problems Father     Alzheimer's disease Sister     Alzheimer's disease Sister     Alzheimer's disease Brother     Colon cancer Neg Hx     Colon polyps Neg Hx        Social History:   reports that he quit smoking about 49 years ago. His smoking use included cigarettes. He has never used smokeless tobacco. He reports that he does not drink alcohol and does not use drugs.    Medications:   Prior to Admission medications    Medication Sig Start Date End Date Taking? Authorizing Provider   acetaminophen (TYLENOL) 500 MG tablet Take 1 tablet by mouth Every 6  (Six) Hours As Needed for Mild Pain.   Yes Farrukh Farias MD   aluminum hydroxide-mag carbonate (GAVISCON EXTRA RELIEF) 160-105 MG chewable tablet chewable tablet Chew Daily As Needed.   Yes Farrukh Farias MD   glucose blood (Contour Next Test) test strip 1 each by Other route Daily. Contour Next EZ test strip 7/27/23  Yes Jr Galeana MD   ibuprofen (ADVIL,MOTRIN) 200 MG tablet Take 1 tablet by mouth Every 8 (Eight) Hours As Needed for Mild Pain.   Yes Farrukh Farias MD   metFORMIN ER (GLUCOPHAGE-XR) 750 MG 24 hr tablet Take 1 tablet by mouth twice daily 8/8/23  Yes Jr Galeana MD   polyethylene glycol (GoLYTELY) 236 g solution As Direct by office, may add flavor packet. 6/6/23  Yes Deepti Brito APRN   traMADol (ULTRAM) 50 MG tablet Take 1 tablet by mouth Daily As Needed for Moderate Pain. 4/12/23  Yes Jr Galeana MD   ALPRAZolam (XANAX) 0.5 MG tablet Take 1 tablet by mouth At Night As Needed for Anxiety. 7/12/23   Jr Galeana MD   aspirin 81 MG chewable tablet Chew 1 tablet Daily. Takes every other day.    Farrukh Farias MD   coenzyme Q10 100 MG capsule Take 2 capsules by mouth Every Evening.    Farrukh Farias MD   cyclobenzaprine (FLEXERIL) 10 MG tablet Take 1 tablet by mouth 3 (Three) Times a Day As Needed for Muscle Spasms. 3/9/23   Jr Galeana MD   diclofenac (VOLTAREN) 1 % gel gel Apply 4 g topically to the appropriate area as directed 4 (Four) Times a Day As Needed. 1-3 times daily    Farrukh Farias MD   esomeprazole (nexIUM) 40 MG capsule Take 1 capsule by mouth Daily. 12/5/22   Deepti Brito APRN   glipizide (Glucotrol XL) 2.5 MG 24 hr tablet Take 1 tablet by mouth Daily. 2/15/23   Jr Galeana MD   levothyroxine (SYNTHROID, LEVOTHROID) 50 MCG tablet Take 1 tablet by mouth once daily  Patient taking differently: Euthyrox 3/7/23   Ritu Dillon   losartan (COZAAR) 25 MG tablet Take 1 tablet by  "mouth once daily 6/22/23   Jr Galeana MD   rosuvastatin (CRESTOR) 5 MG tablet Take 1 tablet by mouth Every Other Day. 12/28/22   Jory Aggarwal APRN       Allergies:  Aleve [naproxen] and Tape    Objective     Blood pressure 173/71, pulse 65, temperature 97.1 øF (36.2 øC), temperature source Tympanic, resp. rate 20, height 193 cm (76\"), weight 95.3 kg (210 lb), SpO2 99 %.    Physical Exam   Constitutional: Pt is oriented to person, place, and in no distress.   HENT: Mouth/Throat: Oropharynx is clear.   Cardiovascular: Normal rate, regular rhythm.    Pulmonary/Chest: Effort normal. No respiratory distress. No  wheezes.   Abdominal: Soft. Non-distended.  Skin: Skin is warm and dry.   Psychiatric: Mood, memory, affect and judgment appear normal.     Assessment & Plan     Diagnosis:  History of polyps    Anticipated Surgical Procedure:    Proceed with colonoscopy as scheduled    The following major R/B/A were discussed with the patient, however the list is not all inclusive . Risk:  Bleeding (immediate and delayed), perforation (rupture or tear), reaction to medication, missed lesion/cancer, pain during the procedure, infection, need for surgery, need for ostomy, need for mechanical ventilation (breathing machine), death.  Benefits: removal of polyp/tissue, burn/clip/or inject to stop bleeding, removal of foreign body, dilate any stricture.  Alternatives: Xray or CT, surgery, do nothing with associated risk   The patient was given time to ask question and received explanation, and agrees to proceed as per History and Physical.   No guarantee given or expressed.    EMR Dragon/transcription disclaimer: Much of this encounter note is an electronic transcription/translation of spoken language to printed text.  The electronic translation of spoken language may permit erroneous, or at times, nonsensical words or phrases to be inadvertently transcribed.  Although I have reviewed the note for such errors, some may " still exist.    Damien Odonnell MD  07:45 CDT  8/9/2023

## 2023-08-09 NOTE — ANESTHESIA POSTPROCEDURE EVALUATION
"Patient: Caleb Wood    Procedure Summary       Date: 08/09/23 Room / Location: [facility] ENDOSCOPY 2 / [facility] ENDOSCOPY    Anesthesia Start: 0744 Anesthesia Stop: 0815    Procedure: COLONOSCOPY WITH ANESTHESIA Diagnosis:       Hx of colonic polyps      (Hx of colonic polyps [Z86.010])    Surgeons: Damien Odonnell MD Provider: Jr Mccall CRNA    Anesthesia Type: MAC ASA Status: 2            Anesthesia Type: MAC    Vitals  Vitals Value Taken Time   /70 08/09/23 0816   Temp     Pulse 55 08/09/23 0821   Resp 18 08/09/23 0812   SpO2 100 % 08/09/23 0821   Vitals shown include unvalidated device data.        Post Anesthesia Care and Evaluation    Patient location during evaluation: PACU  Patient participation: complete - patient participated  Level of consciousness: awake and alert  Pain management: adequate    Airway patency: patent  Anesthetic complications: No anesthetic complications    Cardiovascular status: acceptable  Respiratory status: acceptable  Hydration status: acceptable    Comments: Blood pressure 137/74, pulse 57, temperature 97.1 øF (36.2 øC), temperature source Tympanic, resp. rate 18, height 193 cm (76\"), weight 95.3 kg (210 lb), SpO2 100 %.    Pt discharged from PACU based on edwin score >8    "

## 2023-09-05 RX ORDER — METFORMIN HYDROCHLORIDE 750 MG/1
TABLET, EXTENDED RELEASE ORAL
Qty: 180 TABLET | Refills: 3 | Status: SHIPPED | OUTPATIENT
Start: 2023-09-05

## 2023-09-18 ENCOUNTER — OFFICE VISIT (OUTPATIENT)
Dept: INTERNAL MEDICINE | Facility: CLINIC | Age: 83
End: 2023-09-18
Payer: MEDICARE

## 2023-09-18 VITALS
DIASTOLIC BLOOD PRESSURE: 86 MMHG | OXYGEN SATURATION: 97 % | WEIGHT: 215 LBS | TEMPERATURE: 98.4 F | HEIGHT: 76 IN | BODY MASS INDEX: 26.18 KG/M2 | SYSTOLIC BLOOD PRESSURE: 156 MMHG | HEART RATE: 59 BPM | RESPIRATION RATE: 18 BRPM

## 2023-09-18 DIAGNOSIS — M62.838 MUSCLE SPASM: ICD-10-CM

## 2023-09-18 DIAGNOSIS — R10.9 FLANK PAIN: Primary | ICD-10-CM

## 2023-09-18 DIAGNOSIS — I10 BENIGN ESSENTIAL HTN: Chronic | ICD-10-CM

## 2023-09-18 LAB
BILIRUB BLD-MCNC: NEGATIVE MG/DL
CLARITY, POC: CLEAR
COLOR UR: YELLOW
EXPIRATION DATE: NORMAL
GLUCOSE UR STRIP-MCNC: NEGATIVE MG/DL
KETONES UR QL: NEGATIVE
LEUKOCYTE EST, POC: NEGATIVE
Lab: NORMAL
NITRITE UR-MCNC: NEGATIVE MG/ML
PH UR: 6 [PH] (ref 5–8)
PROT UR STRIP-MCNC: NEGATIVE MG/DL
RBC # UR STRIP: NEGATIVE /UL
SP GR UR: 1.01 (ref 1–1.03)
UROBILINOGEN UR QL: NORMAL

## 2023-09-18 PROCEDURE — 3077F SYST BP >= 140 MM HG: CPT | Performed by: INTERNAL MEDICINE

## 2023-09-18 PROCEDURE — 81003 URINALYSIS AUTO W/O SCOPE: CPT | Performed by: INTERNAL MEDICINE

## 2023-09-18 PROCEDURE — 99213 OFFICE O/P EST LOW 20 MIN: CPT | Performed by: INTERNAL MEDICINE

## 2023-09-18 PROCEDURE — 3079F DIAST BP 80-89 MM HG: CPT | Performed by: INTERNAL MEDICINE

## 2023-09-18 NOTE — PROGRESS NOTES
"      Chief Complaint  Flank Pain (Left side pain that feels like a spasm.  Patient states the pain goes away with cyclobenzaprine.)    Subjective        Caleb Wood presents to [facility] PRIMARY CARE    HPI  Patient here for the above problems.  See Assessment and Plan for further HPI components.        Review of Systems    Objective   Vital Signs:  /86 (BP Location: Left arm, Patient Position: Sitting, Cuff Size: Adult)   Pulse 59   Temp 98.4 °F (36.9 °C) (Infrared)   Resp 18   Ht 193 cm (76\")   Wt 97.5 kg (215 lb)   SpO2 97%   BMI 26.17 kg/m²   Estimated body mass index is 26.17 kg/m² as calculated from the following:    Height as of this encounter: 193 cm (76\").    Weight as of this encounter: 97.5 kg (215 lb).      Physical Exam  Vitals reviewed.   Constitutional:       Appearance: He is not ill-appearing.   Cardiovascular:      Rate and Rhythm: Normal rate.      Heart sounds: Murmur heard.   Abdominal:      General: There is no distension.      Tenderness: There is no abdominal tenderness. There is no right CVA tenderness, left CVA tenderness, guarding or rebound.   Skin:     General: Skin is warm.   Neurological:      General: No focal deficit present.      Mental Status: He is alert and oriented to person, place, and time.                       Assessment and Plan   Diagnoses and all orders for this visit:    1. Flank pain (Primary)  -     POCT urinalysis dipstick, automated    2. Benign essential HTN    3. Muscle spasm        BP up a little today.  Not at goal.  Recommend he monitor this.      Patient has a history of kidney stones.  UA was unremarkable for blood.    He had the spasms occurring before he played golf recently.  Has been doing some yard work and other things recently denies any injury.  The area he is pointing to, could be intestinal or MSK.  He seems to have a hard time describing how superficial or deep the location is.  Patient not having it at present.  " Patient has not had any discomfort today.  It responded to flexeril.  No red flag signs or symptoms to warrant imaging.  Stools were a little hard prior to having this discomfort which could have triggered his discomfort.  Recommend conservative and supportive care.  Suspect he is just having a muscle spasm.    Result Review :                             Follow Up   No follow-ups on file.  Patient was given instructions and counseling regarding his condition or for health maintenance advice. Please see specific information pulled into the AVS if appropriate.       INDY Galeana MD, FACP, [facility]      Electronically signed by Jr Galeana MD, 09/18/23, 3:06 PM CDT.

## 2023-10-04 ENCOUNTER — OFFICE VISIT (OUTPATIENT)
Dept: INTERNAL MEDICINE | Facility: CLINIC | Age: 83
End: 2023-10-04
Payer: MEDICARE

## 2023-10-04 VITALS
HEART RATE: 62 BPM | BODY MASS INDEX: 26.55 KG/M2 | WEIGHT: 218 LBS | RESPIRATION RATE: 18 BRPM | DIASTOLIC BLOOD PRESSURE: 86 MMHG | HEIGHT: 76 IN | TEMPERATURE: 98.2 F | SYSTOLIC BLOOD PRESSURE: 146 MMHG | OXYGEN SATURATION: 99 %

## 2023-10-04 DIAGNOSIS — R05.9 COUGH, UNSPECIFIED TYPE: ICD-10-CM

## 2023-10-04 DIAGNOSIS — J06.9 UPPER RESPIRATORY INFECTION, VIRAL: Primary | ICD-10-CM

## 2023-10-04 LAB
EXPIRATION DATE: NORMAL
INTERNAL CONTROL: NORMAL
Lab: NORMAL
SARS-COV-2 AG UPPER RESP QL IA.RAPID: NOT DETECTED

## 2023-10-04 PROCEDURE — 99213 OFFICE O/P EST LOW 20 MIN: CPT

## 2023-10-04 PROCEDURE — 1159F MED LIST DOCD IN RCRD: CPT

## 2023-10-04 PROCEDURE — 1160F RVW MEDS BY RX/DR IN RCRD: CPT

## 2023-10-04 PROCEDURE — 3077F SYST BP >= 140 MM HG: CPT

## 2023-10-04 PROCEDURE — 87426 SARSCOV CORONAVIRUS AG IA: CPT

## 2023-10-04 PROCEDURE — 3079F DIAST BP 80-89 MM HG: CPT

## 2023-10-04 RX ORDER — TRIAMCINOLONE ACETONIDE 40 MG/ML
40 INJECTION, SUSPENSION INTRA-ARTICULAR; INTRAMUSCULAR ONCE
Status: COMPLETED | OUTPATIENT
Start: 2023-10-04 | End: 2023-10-04

## 2023-10-04 RX ORDER — BENZONATATE 100 MG/1
100 CAPSULE ORAL 3 TIMES DAILY PRN
Qty: 30 CAPSULE | Refills: 2 | Status: SHIPPED | OUTPATIENT
Start: 2023-10-04

## 2023-10-04 RX ORDER — DEXTROMETHORPHAN POLISTIREX 30 MG/5ML
60 SUSPENSION ORAL EVERY 12 HOURS SCHEDULED
Qty: 280 ML | Refills: 0 | Status: SHIPPED | OUTPATIENT
Start: 2023-10-04

## 2023-10-04 RX ADMIN — TRIAMCINOLONE ACETONIDE 40 MG: 40 INJECTION, SUSPENSION INTRA-ARTICULAR; INTRAMUSCULAR at 13:22

## 2023-10-04 NOTE — PROGRESS NOTES
Subjective     Chief Complaint:  Cough, sinus drainage    HPI:  Patient presents today with complaints of a cough that has been present for approximately 1 week.  He denies any sputum production.  He has been taking NyQuil, using cough drops, and nasal spray which have not been effective.  He brought an empty bottle of Tessalon Perles and states that Dr. Garcias used to prescribe those for him and they seemed to be helpful.  He has also had some sinus drainage.  He reports that every morning he coughs up phlegm but otherwise does not have a productive cough.  He states that his throat is sore but thinks that this is mostly related to excessive coughing.  COVID test in the clinic today was negative.  He denies fever or shortness of breath.    Past Medical History:   Past Medical History:   Diagnosis Date    Diabetes mellitus     Type 2    Disease of thyroid gland     GERD (gastroesophageal reflux disease)     Hemorrhoids     Hx of colonic polyps     Hypercholesteremia      Past Surgical History:  Past Surgical History:   Procedure Laterality Date    CATARACT EXTRACTION Bilateral     CHOLECYSTECTOMY      COLONOSCOPY  01/17/2012    Colon polyp tissue not retrieved repeat exam in 5 years    COLONOSCOPY  01/06/2009    adenomatous polyp x1    COLONOSCOPY N/A 06/01/2017    Tubular adenoma Transvere colon, Diverticulosis repeat exam in 5 years    COLONOSCOPY N/A 07/20/2022    Fair prep, 3 Tubular adenomas ascending colon and at 60 cm, tics repeat exam in 1 year    COLONOSCOPY N/A 8/9/2023    Procedure: COLONOSCOPY WITH ANESTHESIA;  Surgeon: Damien Odonnell MD;  Location: [facility] ENDOSCOPY;  Service: Gastroenterology;  Laterality: N/A;  Pre: Hx of colonic polyps;  Post: Polyps;  Jr Galeana MD    CYSTOSCOPY TRANSURETHRAL RESECTION OF PROSTATE N/A 08/02/2022    Procedure: CYSTOSCOPY TRANSURETHRAL RESECTION OF PROSTATE;  Surgeon: López Post MD;  Location: [facility] OR;  Service: Urology;  Laterality: N/A;     ENDOSCOPY  06/25/2013        ENDOSCOPY N/A 01/29/2021    Multiple fundic gland polyps     KNEE SURGERY      SHOULDER SURGERY Right     TONSILLECTOMY         Allergies:  Allergies   Allergen Reactions    Aleve [Naproxen] Hallucinations    Tape Rash     Surgical tape     Medications:  Prior to Admission medications    Medication Sig Start Date End Date Taking? Authorizing Provider   acetaminophen (TYLENOL) 500 MG tablet Take 1 tablet by mouth Every 6 (Six) Hours As Needed for Mild Pain.   Yes Farrukh Farias MD   ALPRAZolam (XANAX) 0.5 MG tablet Take 1 tablet by mouth At Night As Needed for Anxiety. 7/12/23  Yes Jr Galeana MD   aluminum hydroxide-mag carbonate (GAVISCON EXTRA RELIEF) 160-105 MG chewable tablet chewable tablet Chew Daily As Needed.   Yes Farrukh Farias MD   aspirin 81 MG chewable tablet Chew 1 tablet Daily. Takes every other day.   Yes Farrukh Farias MD   coenzyme Q10 100 MG capsule Take 2 capsules by mouth Every Evening.   Yes Farrukh Farias MD   cyclobenzaprine (FLEXERIL) 10 MG tablet Take 1 tablet by mouth 3 (Three) Times a Day As Needed for Muscle Spasms. 3/9/23  Yes Jr Galeana MD   diclofenac (VOLTAREN) 1 % gel gel Apply 4 g topically to the appropriate area as directed 4 (Four) Times a Day As Needed. 1-3 times daily   Yes Farrukh Farias MD   esomeprazole (nexIUM) 40 MG capsule Take 1 capsule by mouth Daily. 12/5/22  Yes Deepti Brito APRN   glipizide (Glucotrol XL) 2.5 MG 24 hr tablet Take 1 tablet by mouth Daily. 2/15/23  Yes Jr Galeana MD   glucose blood (Contour Next Test) test strip 1 each by Other route Daily. Contour Next EZ test strip 7/27/23  Yes Jr Galeana MD   ibuprofen (ADVIL,MOTRIN) 200 MG tablet Take 1 tablet by mouth Every 8 (Eight) Hours As Needed for Mild Pain.   Yes Farrukh Farias MD   levothyroxine (SYNTHROID, LEVOTHROID) 50 MCG tablet Take 1 tablet by mouth once daily  Patient taking  "differently: Euthyrox 3/7/23  Yes Wilma Ritu Ayanna   losartan (COZAAR) 25 MG tablet Take 1 tablet by mouth once daily 6/22/23  Yes Jr Galeana MD   metFORMIN ER (GLUCOPHAGE-XR) 750 MG 24 hr tablet Take 1 tablet by mouth twice daily 9/5/23  Yes Jr Galeana MD   rosuvastatin (CRESTOR) 5 MG tablet Take 1 tablet by mouth Every Other Day. 12/28/22  Yes Jory Aggarwal APRN   traMADol (ULTRAM) 50 MG tablet Take 1 tablet by mouth Daily As Needed for Moderate Pain. 4/12/23  Yes Jr Galeana MD       Objective     Vital Signs: /86 (BP Location: Left arm, Patient Position: Sitting, Cuff Size: Adult)   Pulse 62   Temp 98.2 °F (36.8 °C) (Infrared)   Resp 18   Ht 193 cm (76\")   Wt 98.9 kg (218 lb)   SpO2 99%   BMI 26.54 kg/m²   Physical Exam  Vitals and nursing note reviewed.   Constitutional:       General: He is not in acute distress.     Appearance: Normal appearance.   HENT:      Right Ear: Tympanic membrane normal.      Left Ear: Tympanic membrane normal.      Nose: Congestion present.   Cardiovascular:      Rate and Rhythm: Normal rate.      Pulses: Normal pulses.      Heart sounds: Normal heart sounds.   Pulmonary:      Effort: Pulmonary effort is normal. No respiratory distress.      Breath sounds: Normal breath sounds. No wheezing.   Abdominal:      General: Bowel sounds are normal. There is no distension.      Palpations: Abdomen is soft.      Tenderness: There is no abdominal tenderness.   Skin:     General: Skin is warm and dry.      Capillary Refill: Capillary refill takes less than 2 seconds.      Findings: No bruising, lesion or rash.   Neurological:      Mental Status: He is alert and oriented to person, place, and time. Mental status is at baseline.      Motor: No weakness.     Results Reviewed:  Reviewed renal function on CMP obtained on 7/10/2023.    Assessment / Plan     Assessment/Plan:  Diagnoses and all orders for this visit:    1. Upper respiratory infection, " viral (Primary)  -     POCT SARS-CoV-2 Antigen DEANA  -     benzonatate (Tessalon Perles) 100 MG capsule; Take 1 capsule by mouth 3 (Three) Times a Day As Needed for Cough.  Dispense: 30 capsule; Refill: 2  -     dextromethorphan polistirex ER (Delsym) 30 MG/5ML Suspension Extended Release oral suspension; Take 10 mL by mouth Every 12 (Twelve) Hours.  Dispense: 280 mL; Refill: 0  -     triamcinolone acetonide (KENALOG-40) injection 40 mg    2. Cough, unspecified type  -     POCT SARS-CoV-2 Antigen DEANA  -     benzonatate (Tessalon Perles) 100 MG capsule; Take 1 capsule by mouth 3 (Three) Times a Day As Needed for Cough.  Dispense: 30 capsule; Refill: 2  -     dextromethorphan polistirex ER (Delsym) 30 MG/5ML Suspension Extended Release oral suspension; Take 10 mL by mouth Every 12 (Twelve) Hours.  Dispense: 280 mL; Refill: 0  -     triamcinolone acetonide (KENALOG-40) injection 40 mg       I do not have any concern at this time that the patient has a bacterial infection. We discussed that he likely has a viral upper respiratory infection. Will treat with Kenalog injection in the clinic today. He will be given a refill of Tessalon Perles. He may also use Delsym twice daily as needed. Encouraged him to take Mucinex 1200 mg twice daily which he plans to get OTC. He may continue using a nasal spray. Advised him to call if his symptoms worsen or fail to improve.  Would not hesitate to send in Medrol dose pack if needed as he reports that his blood sugar has been well controlled.    Return for Next scheduled follow up. unless patient needs to be seen sooner or acute issues arise.    I have discussed the patient results/orders and and plan/recommendation with them at today's visit.      Trina Post, APRN   10/04/2023

## 2023-10-05 ENCOUNTER — TELEPHONE (OUTPATIENT)
Dept: INTERNAL MEDICINE | Facility: CLINIC | Age: 83
End: 2023-10-05

## 2023-10-05 DIAGNOSIS — J06.9 UPPER RESPIRATORY TRACT INFECTION, UNSPECIFIED TYPE: ICD-10-CM

## 2023-10-05 DIAGNOSIS — J06.9 UPPER RESPIRATORY INFECTION, VIRAL: Primary | ICD-10-CM

## 2023-10-05 RX ORDER — METHYLPREDNISOLONE 4 MG/1
TABLET ORAL
Qty: 21 TABLET | Refills: 0 | Status: SHIPPED | OUTPATIENT
Start: 2023-10-05

## 2023-10-05 RX ORDER — AZITHROMYCIN 250 MG/1
250 TABLET, FILM COATED ORAL TAKE AS DIRECTED
Qty: 6 TABLET | Refills: 0 | Status: SHIPPED | OUTPATIENT
Start: 2023-10-05

## 2023-10-05 NOTE — TELEPHONE ENCOUNTER
Caller: Caleb Wood    Relationship: Self    Best call back number: [phone]     What is the best time to reach you: ANY    Who are you requesting to speak with (clinical staff, provider,  specific staff member): SMOOTH WILKERSON     What was the call regarding:     PATIENT IS REQUESTING TO SPEAK TO SMOOTH REGARDING HIS COUGH AND 10.04.23 VISIT.

## 2023-11-27 NOTE — PROGRESS NOTES
"Subjective    Mr. Wood is 83 y.o. male    Chief Complaint: BPH    History of Present Illness    83-year-old male follow-up for enlarged prostate and erectile dysfunction.  He states he continues to do well after TURP on 8/2/2022.  His prostate chip pathology was benign.  He is voiding with a much stronger stream and is overall pleased.  This was his second TURP.    He states the tadalafil \"did nothing\" but desires no current treatment for his ED.    The following portions of the patient's history were reviewed and updated as appropriate: allergies, current medications, past family history, past medical history, past social history, past surgical history and problem list.    Review of Systems      Current Outpatient Medications:     acetaminophen (TYLENOL) 500 MG tablet, Take 1 tablet by mouth Every 6 (Six) Hours As Needed for Mild Pain., Disp: , Rfl:     ALPRAZolam (XANAX) 0.5 MG tablet, Take 1 tablet by mouth At Night As Needed for Anxiety., Disp: 30 tablet, Rfl: 2    aluminum hydroxide-mag carbonate (GAVISCON EXTRA RELIEF) 160-105 MG chewable tablet chewable tablet, Chew Daily As Needed., Disp: , Rfl:     aspirin 81 MG chewable tablet, Chew 1 tablet Daily. Takes every other day., Disp: , Rfl:     azithromycin (Zithromax Z-Daniel) 250 MG tablet, Take 1 tablet by mouth Take As Directed. Take 2 tablets by mouth the first day, then 1 tablet daily for 4 days., Disp: 6 tablet, Rfl: 0    benzonatate (Tessalon Perles) 100 MG capsule, Take 1 capsule by mouth 3 (Three) Times a Day As Needed for Cough., Disp: 30 capsule, Rfl: 2    coenzyme Q10 100 MG capsule, Take 2 capsules by mouth Every Evening., Disp: , Rfl:     cyclobenzaprine (FLEXERIL) 10 MG tablet, Take 1 tablet by mouth 3 (Three) Times a Day As Needed for Muscle Spasms., Disp: 30 tablet, Rfl: 0    dextromethorphan polistirex ER (Delsym) 30 MG/5ML Suspension Extended Release oral suspension, Take 10 mL by mouth Every 12 (Twelve) Hours., Disp: 280 mL, Rfl: 0    " diclofenac (VOLTAREN) 1 % gel gel, Apply 4 g topically to the appropriate area as directed 4 (Four) Times a Day As Needed. 1-3 times daily, Disp: , Rfl:     esomeprazole (nexIUM) 40 MG capsule, Take 1 capsule by mouth Daily., Disp: 90 capsule, Rfl: 4    glipizide (Glucotrol XL) 2.5 MG 24 hr tablet, Take 1 tablet by mouth Daily., Disp: 30 tablet, Rfl: 11    glucose blood (Contour Next Test) test strip, 1 each by Other route Daily. Contour Next EZ test strip, Disp: 100 each, Rfl: 3    ibuprofen (ADVIL,MOTRIN) 200 MG tablet, Take 1 tablet by mouth Every 8 (Eight) Hours As Needed for Mild Pain., Disp: , Rfl:     levothyroxine (SYNTHROID, LEVOTHROID) 50 MCG tablet, Take 1 tablet by mouth once daily (Patient taking differently: Euthyrox), Disp: 90 tablet, Rfl: 3    losartan (COZAAR) 25 MG tablet, Take 1 tablet by mouth once daily, Disp: 90 tablet, Rfl: 3    metFORMIN ER (GLUCOPHAGE-XR) 750 MG 24 hr tablet, Take 1 tablet by mouth twice daily, Disp: 180 tablet, Rfl: 3    methylPREDNISolone (MEDROL) 4 MG dose pack, Take as directed on package instructions., Disp: 21 tablet, Rfl: 0    rosuvastatin (CRESTOR) 5 MG tablet, Take 1 tablet by mouth Every Other Day., Disp: 45 tablet, Rfl: 3    traMADol (ULTRAM) 50 MG tablet, Take 1 tablet by mouth Daily As Needed for Moderate Pain., Disp: 30 tablet, Rfl: 0    Past Medical History:   Diagnosis Date    Diabetes mellitus     Type 2    Disease of thyroid gland     GERD (gastroesophageal reflux disease)     Hemorrhoids     Hx of colonic polyps     Hypercholesteremia        Past Surgical History:   Procedure Laterality Date    CATARACT EXTRACTION Bilateral     CHOLECYSTECTOMY      COLONOSCOPY  01/17/2012    Colon polyp tissue not retrieved repeat exam in 5 years    COLONOSCOPY  01/06/2009    adenomatous polyp x1    COLONOSCOPY N/A 06/01/2017    Tubular adenoma Transvere colon, Diverticulosis repeat exam in 5 years    COLONOSCOPY N/A 07/20/2022    Fair prep, 3 Tubular adenomas ascending  "colon and at 60 cm, tics repeat exam in 1 year    COLONOSCOPY N/A 2023    Procedure: COLONOSCOPY WITH ANESTHESIA;  Surgeon: Damien Odonnell MD;  Location: [facility] ENDOSCOPY;  Service: Gastroenterology;  Laterality: N/A;  Pre: Hx of colonic polyps;  Post: Polyps;  Jr Galeana MD    CYSTOSCOPY TRANSURETHRAL RESECTION OF PROSTATE N/A 2022    Procedure: CYSTOSCOPY TRANSURETHRAL RESECTION OF PROSTATE;  Surgeon: López Post MD;  Location: [facility] OR;  Service: Urology;  Laterality: N/A;    ENDOSCOPY  2013        ENDOSCOPY N/A 2021    Multiple fundic gland polyps     KNEE SURGERY      SHOULDER SURGERY Right     TONSILLECTOMY         Social History     Socioeconomic History    Marital status:    Tobacco Use    Smoking status: Former     Types: Cigarettes     Quit date:      Years since quittin.9    Smokeless tobacco: Never   Vaping Use    Vaping Use: Never used   Substance and Sexual Activity    Alcohol use: No    Drug use: No    Sexual activity: Not Currently       Family History   Problem Relation Age of Onset    Alzheimer's disease Mother     No Known Problems Father     Alzheimer's disease Sister     Alzheimer's disease Sister     Alzheimer's disease Brother     Colon cancer Neg Hx     Colon polyps Neg Hx        Objective    Temp 96.8 °F (36 °C)   Ht 193 cm (76\")   Wt 98.1 kg (216 lb 3.2 oz)   BMI 26.32 kg/m²     Physical Exam        Results for orders placed or performed in visit on 23   POC Urinalysis Dipstick, Multipro    Specimen: Urine   Result Value Ref Range    Color Yellow Yellow, Straw, Dark Yellow, Betsy    Clarity, UA Clear Clear    Glucose,  mg/dL (A) Negative mg/dL    Bilirubin Negative Negative    Ketones, UA Negative Negative    Specific Gravity  1.010 1.005 - 1.030    Blood, UA Negative Negative    pH, Urine 6.0 5.0 - 8.0    Protein, POC Negative Negative mg/dL    Urobilinogen, UA Normal Normal, 0.2 E.U./dL    Nitrite, UA Negative " Negative    Leukocytes Negative Negative     IPSS Questionnaire (AUA-7):  Incomplete emptying  Over the past month, how often have you had a sensation of not emptying your bladder completely after you finished urinating?: Less than 1 time in 5 (12/06/23 0813)  Frequency  Over the past month, how often have you had to urinate again less than two hours after you finishied urinating ?: Less than 1 time in 5 (12/06/23 0813)  Intermittency  Over the past month, how often have you found you stopped and started again several time when you urinated ?: Not at all (12/06/23 0813)  Urgency  Over the last month, how often have you found it difficult  have you found it difficult to postpone urination ?: Less than half the time (12/06/23 0813)  Weak Stream  Over the past month, how often have you had a weak urinary stream ?: Less than 1 time in 5 (12/06/23 0813)  Straining  Over the past month, how often have you had to push or strain to begin urination ?: Not at all (12/06/23 0813)  Nocturia  Over the past month, how many times did you most typically get up to urinate from the time you went to bed until the time you got up in the morning ?: 1 time (12/06/23 0813)  Quality of life due to urinary symptoms  If you were to spend the rest of your life with your urinary condition the way it is now, how would feel about that?: Delighted (12/06/23 0813)    Scores  Total IPSS Score: 6 (12/06/23 0813)  Total Score = Symptomatic Level: Mildly symptomatic: 0-7 (12/06/23 0813)        Assessment and Plan    Diagnoses and all orders for this visit:    1. Benign prostatic hyperplasia without lower urinary tract symptoms (Primary)  -     POC Urinalysis Dipstick, Multipro    2. Erectile dysfunction due to arterial insufficiency      Continues to do well after TURP.  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  He desires no further therapy at this time.  He will follow-up as needed.    I spent approximately 10 minutes  providing clinical care for this patient; including review of patient's chart and provider documentation, face to face time spent with patient in examination room (obtaining history, performing physical exam, discussing diagnosis and management options), placing orders, and completing patient documentation.         This document has been signed by ЕЛЕНА Post MD on December 6, 2023 19:52 CST

## 2023-11-29 ENCOUNTER — TELEPHONE (OUTPATIENT)
Dept: VASCULAR SURGERY | Facility: CLINIC | Age: 83
End: 2023-11-29
Payer: MEDICARE

## 2023-11-30 ENCOUNTER — OFFICE VISIT (OUTPATIENT)
Dept: VASCULAR SURGERY | Facility: CLINIC | Age: 83
End: 2023-11-30
Payer: MEDICARE

## 2023-11-30 ENCOUNTER — HOSPITAL ENCOUNTER (OUTPATIENT)
Dept: ULTRASOUND IMAGING | Facility: HOSPITAL | Age: 83
Discharge: HOME | End: 2023-11-30
Payer: MEDICARE

## 2023-11-30 VITALS
HEIGHT: 76 IN | WEIGHT: 215.8 LBS | SYSTOLIC BLOOD PRESSURE: 126 MMHG | OXYGEN SATURATION: 100 % | HEART RATE: 62 BPM | DIASTOLIC BLOOD PRESSURE: 62 MMHG | BODY MASS INDEX: 26.28 KG/M2

## 2023-11-30 DIAGNOSIS — I10 BENIGN ESSENTIAL HTN: ICD-10-CM

## 2023-11-30 DIAGNOSIS — E11.9 CONTROLLED TYPE 2 DIABETES MELLITUS WITHOUT COMPLICATION, WITHOUT LONG-TERM CURRENT USE OF INSULIN: ICD-10-CM

## 2023-11-30 DIAGNOSIS — I65.23 BILATERAL CAROTID ARTERY STENOSIS: ICD-10-CM

## 2023-11-30 DIAGNOSIS — E78.00 PURE HYPERCHOLESTEROLEMIA: ICD-10-CM

## 2023-11-30 DIAGNOSIS — I65.23 BILATERAL CAROTID ARTERY STENOSIS: Primary | ICD-10-CM

## 2023-11-30 DIAGNOSIS — I87.323 CHRONIC VENOUS HYPERTENSION WITH INFLAMMATION INVOLVING BOTH SIDES: ICD-10-CM

## 2023-11-30 PROCEDURE — 93880 EXTRACRANIAL BILAT STUDY: CPT

## 2023-11-30 NOTE — LETTER
"November 30, 2023       No Recipients    Patient: Caleb Wood   YOB: 1940   Date of Visit: 11/30/2023     Dear Jr Galeana MD:       Thank you for referring Caleb Wood to me for evaluation. Below are the relevant portions of my assessment and plan of care.    If you have questions, please do not hesitate to call me. I look forward to following Caleb along with you.         Sincerely,        Romy MERARI Junior        CC:   No Recipients    KarinaRomy cintronMERARI  11/30/23 1831  Sign when Signing Visit  11/30/2023     Jr Galeana MD  [address] [place] [address]     Caleb Wood  1940    Chief Complaint   Patient presents with   • chronic venous hypertension     No complaints no changes. No stroke symptoms       Dear Jr Galeana MD     HPI  I had the pleasure of seeing your patient Caleb Wood in the office today.   As you recall, Caleb Wood is a 83 y.o.  male who you are currently following for routine health maintenance.  Currently, he is doing well and denies any claudication to his lower extremities.  He denies any heaviness, tiredness aching or cramping at night.  He does follow with Dr. Tejeda's office and [place] for diabetic foot care.  He is maintained on aspirin and Crestor.  He did have noninvasive testing performed today, which I did review in office.     Review of Systems   Constitutional: Negative.    HENT: Negative.     Eyes: Negative.    Respiratory: Negative.     Cardiovascular: Negative.    Gastrointestinal: Negative.    Endocrine: Negative.    Genitourinary: Negative.    Musculoskeletal:  Positive for back pain.   Skin: Negative.    Allergic/Immunologic: Negative.    Neurological: Negative.    Hematological: Negative.    Psychiatric/Behavioral: Negative.     All other systems reviewed and are negative.       /62   Pulse 62   Ht 193 cm (76\")   Wt 97.9 kg (215 lb 12.8 oz)   SpO2 100%   BMI 26.27 kg/m²   Physical " Exam  Vitals and nursing note reviewed.   Constitutional:       Appearance: Normal appearance. He is well-developed.   HENT:      Head: Normocephalic and atraumatic.   Eyes:      General: No scleral icterus.     Pupils: Pupils are equal, round, and reactive to light.   Neck:      Thyroid: No thyromegaly.   Cardiovascular:      Rate and Rhythm: Normal rate and regular rhythm.      Heart sounds: Normal heart sounds.      Comments: Varicose veins  Pulmonary:      Effort: Pulmonary effort is normal.      Breath sounds: Normal breath sounds.   Abdominal:      General: Bowel sounds are normal.      Palpations: Abdomen is soft.   Musculoskeletal:         General: Swelling (Trace) present. Normal range of motion.      Cervical back: Normal range of motion and neck supple.   Skin:     General: Skin is warm and dry.   Neurological:      Mental Status: He is alert and oriented to person, place, and time.   Psychiatric:         Behavior: Behavior normal.         Thought Content: Thought content normal.         Judgment: Judgment normal.          Diagnostic Data:  US Carotid Bilateral    Result Date: 11/30/2023  Narrative: History: Carotid occlusive disease      Impression: Impression: 1. There is less than 50% stenosis of the right internal carotid artery. 2. There is less than 50% stenosis of the left internal carotid artery. 3. Antegrade flow is demonstrated in bilateral vertebral arteries.  Comments: Bilateral carotid vertebral arterial duplex scan was performed.  Grayscale imaging shows intimal thickening and calcified elements at the carotid bifurcation. The right internal carotid artery peak systolic velocity is 82.1 cm/sec. The end-diastolic velocity is 18.5 cm/sec. The right ICA/CCA ratio is approximately 0.9. These findings correlate with less than 50% stenosis of the right internal carotid artery.  Grayscale imaging shows intimal thickening and calcified elements at the carotid bifurcation. The left internal carotid  artery peak systolic velocity is 95.1 cm/sec. The end-diastolic velocity is 28.4 cm/sec. The left ICA/CCA ratio is approximately 0.8. These findings correlate with less than 50% stenosis of the left internal carotid artery.  Antegrade flow is demonstrated in bilateral vertebral arteries.   This report was signed and finalized on 11/30/2023 3:52 PM CST by Dr. Víctor May MD.          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 Bilateral carotid artery stenosis  US Carotid Bilateral      2. Chronic venous hypertension with inflammation involving both sides        3. Controlled type 2 diabetes mellitus without complication, without long-term current use of insulin        4. Benign essential HTN        5. Pure hypercholesterolemia              Plan: After thoroughly evaluating Caleb Wood, I believe the best course of action is to remain conservative from vascular surgery standpoint.  Currently he is doing well and denies any strokelike symptoms.  He has no significant discomfort to his lower extremities.  He does have chronic back pain.  I did review his testing which shows less than 50% carotid stenosis bilaterally.  He can continue to wear compression stockings to aid with his swelling.  We will see him back in 1 year with repeat noninvasive testing for continued surveillance including a carotid duplex.  I did discuss vascular risk factors as they pertain to the progression of vascular disease  including controlling his hypertension, hypercholesterolemia, and diabetes.  His blood pressure stable on his current medications.  He should continue on his aspirin 81 mg daily and Crestor 5 mg every other day in addition to his other medications.  His A1c is slightly above goal at 7.6%.  This was all discussed in full with complete understanding.  Thank you for allowing me to participate in the care of your patient.  Please do not hesitate to call with any questions or concerns.  We will keep you aware of any further encounters with Caleb Wood.        Sincerely yours,         MERARI Carl John Eric, MD

## 2023-11-30 NOTE — PROGRESS NOTES
"11/30/2023     Jr Galeana MD  [address] [place] [address]     Caleb Wood  1940    Chief Complaint   Patient presents with    chronic venous hypertension     No complaints no changes. No stroke symptoms       Dear Jr Galeana MD     HPI  I had the pleasure of seeing your patient Caleb Wood in the office today.   As you recall, Caleb oWod is a 83 y.o.  male who you are currently following for routine health maintenance.  Currently, he is doing well and denies any claudication to his lower extremities.  He denies any heaviness, tiredness aching or cramping at night.  He does follow with Dr. Tejeda's office and [place] for diabetic foot care.  He is maintained on aspirin and Crestor.  He did have noninvasive testing performed today, which I did review in office.     Review of Systems   Constitutional: Negative.    HENT: Negative.     Eyes: Negative.    Respiratory: Negative.     Cardiovascular: Negative.    Gastrointestinal: Negative.    Endocrine: Negative.    Genitourinary: Negative.    Musculoskeletal:  Positive for back pain.   Skin: Negative.    Allergic/Immunologic: Negative.    Neurological: Negative.    Hematological: Negative.    Psychiatric/Behavioral: Negative.     All other systems reviewed and are negative.       /62   Pulse 62   Ht 193 cm (76\")   Wt 97.9 kg (215 lb 12.8 oz)   SpO2 100%   BMI 26.27 kg/m²   Physical Exam  Vitals and nursing note reviewed.   Constitutional:       Appearance: Normal appearance. He is well-developed.   HENT:      Head: Normocephalic and atraumatic.   Eyes:      General: No scleral icterus.     Pupils: Pupils are equal, round, and reactive to light.   Neck:      Thyroid: No thyromegaly.   Cardiovascular:      Rate and Rhythm: Normal rate and regular rhythm.      Heart sounds: Normal heart sounds.      Comments: Varicose veins  Pulmonary:      Effort: Pulmonary effort is normal.      Breath sounds: Normal breath sounds. "   Abdominal:      General: Bowel sounds are normal.      Palpations: Abdomen is soft.   Musculoskeletal:         General: Swelling (Trace) present. Normal range of motion.      Cervical back: Normal range of motion and neck supple.   Skin:     General: Skin is warm and dry.   Neurological:      Mental Status: He is alert and oriented to person, place, and time.   Psychiatric:         Behavior: Behavior normal.         Thought Content: Thought content normal.         Judgment: Judgment normal.          Diagnostic Data:  US Carotid Bilateral    Result Date: 11/30/2023  Narrative: History: Carotid occlusive disease      Impression: Impression: 1. There is less than 50% stenosis of the right internal carotid artery. 2. There is less than 50% stenosis of the left internal carotid artery. 3. Antegrade flow is demonstrated in bilateral vertebral arteries.  Comments: Bilateral carotid vertebral arterial duplex scan was performed.  Grayscale imaging shows intimal thickening and calcified elements at the carotid bifurcation. The right internal carotid artery peak systolic velocity is 82.1 cm/sec. The end-diastolic velocity is 18.5 cm/sec. The right ICA/CCA ratio is approximately 0.9. These findings correlate with less than 50% stenosis of the right internal carotid artery.  Grayscale imaging shows intimal thickening and calcified elements at the carotid bifurcation. The left internal carotid artery peak systolic velocity is 95.1 cm/sec. The end-diastolic velocity is 28.4 cm/sec. The left ICA/CCA ratio is approximately 0.8. These findings correlate with less than 50% stenosis of the left internal carotid artery.  Antegrade flow is demonstrated in bilateral vertebral arteries.   This report was signed and finalized on 11/30/2023 3:52 PM CST by Dr. Víctor May MD.          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 Bilateral carotid artery stenosis  US Carotid Bilateral      2. Chronic venous hypertension with inflammation involving both sides        3. Controlled type 2 diabetes mellitus without complication, without long-term current use of insulin        4. Benign essential HTN        5. Pure hypercholesterolemia              Plan: After thoroughly evaluating Caleb Wood, I believe the best course of action is to remain conservative from vascular surgery standpoint.  Currently he is doing well and denies any strokelike symptoms.  He has no significant discomfort to his lower extremities.  He does have chronic back pain.  I did review his testing which shows less than 50% carotid stenosis bilaterally.  He can continue to wear compression stockings to aid with his swelling.  We will see him back in 1 year with repeat noninvasive testing for continued surveillance including a carotid duplex.  I did discuss vascular risk factors as they pertain to the progression of vascular disease including controlling his hypertension, hypercholesterolemia, and diabetes.  His blood pressure stable on his current medications.  He should continue on his aspirin 81 mg daily and Crestor 5 mg every other day in addition to his other medications.  His A1c is slightly above goal at 7.6%.  This was all discussed in full with complete understanding.  Thank you for allowing me to participate in the care of your patient.  Please do not hesitate to call with any questions or concerns.  We will keep you aware of any further encounters with Caleb Wood.         Sincerely yours,         Romy Junior, MERARI Galeana, Jr Stout MD

## 2023-12-06 ENCOUNTER — OFFICE VISIT (OUTPATIENT)
Dept: UROLOGY | Facility: CLINIC | Age: 83
End: 2023-12-06
Payer: MEDICARE

## 2023-12-06 VITALS — HEIGHT: 76 IN | TEMPERATURE: 96.8 F | WEIGHT: 216.2 LBS | BODY MASS INDEX: 26.33 KG/M2

## 2023-12-06 DIAGNOSIS — N52.01 ERECTILE DYSFUNCTION DUE TO ARTERIAL INSUFFICIENCY: ICD-10-CM

## 2023-12-06 DIAGNOSIS — N40.0 BENIGN PROSTATIC HYPERPLASIA WITHOUT LOWER URINARY TRACT SYMPTOMS: Primary | ICD-10-CM

## 2023-12-06 LAB
BILIRUB BLD-MCNC: NEGATIVE MG/DL
CLARITY, POC: CLEAR
COLOR UR: YELLOW
GLUCOSE UR STRIP-MCNC: ABNORMAL MG/DL
KETONES UR QL: NEGATIVE
LEUKOCYTE EST, POC: NEGATIVE
NITRITE UR-MCNC: NEGATIVE MG/ML
PH UR: 6 [PH] (ref 5–8)
PROT UR STRIP-MCNC: NEGATIVE MG/DL
RBC # UR STRIP: NEGATIVE /UL
SP GR UR: 1.01 (ref 1–1.03)
UROBILINOGEN UR QL: NORMAL

## 2024-01-02 RX ORDER — ROSUVASTATIN CALCIUM 5 MG/1
5 TABLET, COATED ORAL EVERY OTHER DAY
Qty: 45 TABLET | Refills: 3 | Status: SHIPPED | OUTPATIENT
Start: 2024-01-02

## 2024-01-10 ENCOUNTER — OFFICE VISIT (OUTPATIENT)
Dept: INTERNAL MEDICINE | Facility: CLINIC | Age: 84
End: 2024-01-10
Payer: MEDICARE

## 2024-01-10 VITALS
OXYGEN SATURATION: 99 % | DIASTOLIC BLOOD PRESSURE: 68 MMHG | HEART RATE: 70 BPM | HEIGHT: 76 IN | SYSTOLIC BLOOD PRESSURE: 150 MMHG | BODY MASS INDEX: 26.3 KG/M2 | TEMPERATURE: 97.7 F | WEIGHT: 216 LBS

## 2024-01-10 DIAGNOSIS — E11.65 TYPE 2 DIABETES MELLITUS WITH HYPERGLYCEMIA, WITH LONG-TERM CURRENT USE OF INSULIN: Primary | ICD-10-CM

## 2024-01-10 DIAGNOSIS — Z79.4 TYPE 2 DIABETES MELLITUS WITH HYPERGLYCEMIA, WITH LONG-TERM CURRENT USE OF INSULIN: Primary | ICD-10-CM

## 2024-01-10 DIAGNOSIS — R19.5 LOOSE STOOLS: ICD-10-CM

## 2024-01-10 DIAGNOSIS — I10 BENIGN ESSENTIAL HTN: Chronic | ICD-10-CM

## 2024-01-10 LAB — HBA1C MFR BLD: 7.4 % (ref 4.5–5.7)

## 2024-01-10 RX ORDER — CHOLESTYRAMINE 4 G/9G
1 POWDER, FOR SUSPENSION ORAL
Qty: 30 PACKET | Refills: 1 | Status: SHIPPED | OUTPATIENT
Start: 2024-01-10

## 2024-01-10 RX ORDER — LOSARTAN POTASSIUM 50 MG/1
50 TABLET ORAL DAILY
Qty: 90 TABLET | Refills: 1 | Status: SHIPPED | OUTPATIENT
Start: 2024-01-10

## 2024-01-10 NOTE — PROGRESS NOTES
"    Chief Complaint  Follow-up (6 mo f/u;), Diabetes (6 mo f/u;/A1c- 7.4%), Hypertension (6 mo f/u; /Pt states BP has been up and down; pt denies experiencing nausea, chest pain, dizziness), and Diarrhea (Began in September; pt denies blood in stool/Pt takes imodium; little relief; pt expresses concern w/ taking long term)    Subjective        Caleb Wood presents to [facility]  History of Present Illness  See below.     Objective   Vital Signs:  /68 (BP Location: Left arm, Patient Position: Sitting, Cuff Size: Adult)   Pulse 70   Temp 97.7 °F (36.5 °C) (Infrared)   Ht 193 cm (76\")   Wt 98 kg (216 lb)   SpO2 99%   BMI 26.29 kg/m²   Estimated body mass index is 26.29 kg/m² as calculated from the following:    Height as of this encounter: 193 cm (76\").    Weight as of this encounter: 98 kg (216 lb).       BMI is >= 25 and <30. (Overweight) The following options were offered after discussion;: weight loss educational material (shared in after visit summary)    Physical Exam     Result Review :  Last full labs were in July 2023 at the time of Medicare wellness. Creatinine 1.2 at that time.      Colonoscopy with Dr. Odonnell in August 2023:           Assessment and Plan   Diagnoses and all orders for this visit:    1. Type 2 diabetes mellitus with hyperglycemia, with long-term current use of insulin (Primary)  -     POC Glycated Hemoglobin, Total    2. Benign essential HTN  -     losartan (Cozaar) 50 MG tablet; Take 1 tablet by mouth Daily.  Dispense: 90 tablet; Refill: 1    3. Loose stools  -     cholestyramine (Questran) 4 g packet; Take 1 packet by mouth Daily With Breakfast.  Dispense: 30 packet; Refill: 1    Other orders  -     Pneumococcal Conjugate Vaccine 20-Valent (PCV20)    Regular patient of Dr. Galeana.  Presents today for 3-month follow-up.  Last saw Dr. Galeana in September.    Hemoglobin A1c is stable/slightly improved.  Continue Glucotrol XL and metformin " ER.    Blood pressure not at goal in the office today.  150/68.  Increase losartan to 50 mg daily from 25 mg daily.    He is at times has been having loose stool.  This has been noticeable since September.  He will have episodes 1-2 times weekly.  Bowels do not run as far mostly used to.  He has been on metformin for several years.  Dr. Garcias started this.  He has had a remote cholecystectomy.  He states that he was on a trip with his girlfriend in September and had an episode of fecal urgency and had a loose stool all over himself while driving.  He had recurrence of an episode like this a few weeks ago on the way to visit a friend.  He has been taking Imodium 1-2 times weekly.  I think that is fine.  Can also conduct a trial of cholestyramine.  He does not have stools in the night.  He does not believe that anything triggers this such as foods or caffeine even though the most pronounced episodes always happen in the morning.  He has not noticed blood.  He just had a colonoscopy in August with Dr. Odonnell.    Excepted an updated pneumococcal vaccine today.  He was vaccinated for flu and RSV last week.    Plan to have him back to see Dr. Galeana in 3 months.  He can call sooner if problems.         Follow Up   Return in about 3 months (around 4/10/2024) for Recheck.  Patient was given instructions and counseling regarding his condition or for health maintenance advice. Please see specific information pulled into the AVS if appropriate.    INDY Can DO     Electronically signed by CHRIS Can DO, 01/10/24, 9:51 AM CST.

## 2024-01-23 ENCOUNTER — TELEPHONE (OUTPATIENT)
Dept: INTERNAL MEDICINE | Facility: CLINIC | Age: 84
End: 2024-01-23
Payer: MEDICARE

## 2024-01-23 NOTE — TELEPHONE ENCOUNTER
Caller: Caleb Wood    Relationship: Self    Best call back number: [phone]     What is the best time to reach you: ANYTIME    Who are you requesting to speak with (clinical staff, provider,  specific staff member): DR. HAWK    Do you know the name of the person who called: CALEB    What was the call regarding: CALEB WOULD LIKE DR HAWK TO CALL HIM BACK BECAUSE HE'S BEEN HAVING DIARRHEA ON AND OFF SEPTEMBER OF LAST OF 2023 AND HE IS CONCERNED ABOUT THAT. ALSO HE DOESN'T AN APPOINTMENT TIL APRIL AND IF CARINE TENORIO NEEDS HIM TO COME IN HE WILL COME IN.    PLEASE HAVE DR. HAWK TO CALL HIM BACK.    Is it okay if the provider responds through [employer]: NO

## 2024-01-24 NOTE — TELEPHONE ENCOUNTER
Called pt and reviewed Dr. Galeana's recommendations, which he understands.  He says he has been taking Questran since visit with Dr. aCn.  He will try witholding his Metformin in the meantime as well.  He has an appt with Dr. Galeana next Wed that he wants to keep.  He wants to be checked for C diff or other etiology, due to knowing others who have had C diff.  He is having sporadic issues with diarrhea.

## 2024-01-25 RX ORDER — GLIPIZIDE 2.5 MG/1
2.5 TABLET, EXTENDED RELEASE ORAL DAILY
Qty: 90 TABLET | Refills: 3 | Status: SHIPPED | OUTPATIENT
Start: 2024-01-25

## 2024-01-31 ENCOUNTER — OFFICE VISIT (OUTPATIENT)
Dept: INTERNAL MEDICINE | Facility: CLINIC | Age: 84
End: 2024-01-31
Payer: MEDICARE

## 2024-01-31 VITALS
TEMPERATURE: 97.8 F | SYSTOLIC BLOOD PRESSURE: 132 MMHG | DIASTOLIC BLOOD PRESSURE: 74 MMHG | BODY MASS INDEX: 26.18 KG/M2 | HEART RATE: 72 BPM | HEIGHT: 76 IN | WEIGHT: 215 LBS | OXYGEN SATURATION: 98 %

## 2024-01-31 DIAGNOSIS — F43.23 SITUATIONAL MIXED ANXIETY AND DEPRESSIVE DISORDER: ICD-10-CM

## 2024-01-31 DIAGNOSIS — E78.00 PURE HYPERCHOLESTEROLEMIA: Primary | Chronic | ICD-10-CM

## 2024-01-31 DIAGNOSIS — R19.5 LOOSE STOOLS: ICD-10-CM

## 2024-01-31 DIAGNOSIS — E11.65 TYPE 2 DIABETES MELLITUS WITH HYPERGLYCEMIA, WITH LONG-TERM CURRENT USE OF INSULIN: ICD-10-CM

## 2024-01-31 DIAGNOSIS — Z79.4 TYPE 2 DIABETES MELLITUS WITH HYPERGLYCEMIA, WITH LONG-TERM CURRENT USE OF INSULIN: ICD-10-CM

## 2024-01-31 DIAGNOSIS — I10 BENIGN ESSENTIAL HTN: Chronic | ICD-10-CM

## 2024-01-31 DIAGNOSIS — Z79.899 ENCOUNTER FOR LONG TERM BENZODIAZEPINE THERAPY: ICD-10-CM

## 2024-01-31 DIAGNOSIS — E03.9 ACQUIRED HYPOTHYROIDISM: Chronic | ICD-10-CM

## 2024-01-31 RX ORDER — LOSARTAN POTASSIUM 50 MG/1
50 TABLET ORAL DAILY
Start: 2024-01-31

## 2024-01-31 RX ORDER — GLIPIZIDE 5 MG/1
5 TABLET, FILM COATED, EXTENDED RELEASE ORAL DAILY
Qty: 90 TABLET | Refills: 2 | Status: SHIPPED | OUTPATIENT
Start: 2024-01-31

## 2024-01-31 RX ORDER — CHOLESTYRAMINE 4 G/9G
1 POWDER, FOR SUSPENSION ORAL DAILY PRN
Start: 2024-01-31

## 2024-01-31 RX ORDER — ALPRAZOLAM 0.5 MG/1
0.5 TABLET ORAL NIGHTLY PRN
Start: 2024-01-31 | End: 2024-02-05 | Stop reason: SDUPTHER

## 2024-01-31 NOTE — PROGRESS NOTES
"      Chief Complaint  Diarrhea (Has not had issue since coming off of metformin on 1/24/24/Was put on questran at visit on 1/10/24- does he need to continue )    Subjective        Caleb Wood presents to [facility]    HPI    Patient here for the above problems.  See Assessment and Plan for further HPI components.      Review of Systems    Objective   Vital Signs:  /74 (BP Location: Left arm, Patient Position: Sitting, Cuff Size: Adult)   Pulse 72   Temp 97.8 °F (36.6 °C) (Temporal)   Ht 193 cm (76\")   Wt 97.5 kg (215 lb)   SpO2 98%   BMI 26.17 kg/m²   Estimated body mass index is 26.17 kg/m² as calculated from the following:    Height as of this encounter: 193 cm (76\").    Weight as of this encounter: 97.5 kg (215 lb).      Physical Exam  Vitals and nursing note reviewed.   Constitutional:       Appearance: He is not ill-appearing.   Eyes:      General: No scleral icterus.     Conjunctiva/sclera: Conjunctivae normal.   Pulmonary:      Effort: Pulmonary effort is normal. No respiratory distress.   Neurological:      General: No focal deficit present.      Mental Status: He is alert and oriented to person, place, and time.   Psychiatric:         Mood and Affect: Mood normal.         Behavior: Behavior normal.                       Assessment and Plan   Diagnoses and all orders for this visit:    1. Pure hypercholesterolemia (Primary)    2. Loose stools  -     cholestyramine (Questran) 4 g packet; Take 1 packet by mouth Daily As Needed (diarrhea).    3. Situational mixed anxiety and depressive disorder    4. Encounter for long term benzodiazepine therapy  -     ALPRAZolam (XANAX) 0.5 MG tablet; Take 1 tablet by mouth At Night As Needed for Anxiety.    5. Benign essential HTN  -     losartan (Cozaar) 50 MG tablet; Take 1 tablet by mouth Daily.    6. Type 2 diabetes mellitus with hyperglycemia, with long-term current use of insulin  -     glipizide (GLUCOTROL XL) 5 MG ER " tablet; Take 1 tablet by mouth Daily.  Dispense: 90 tablet; Refill: 2    7. Acquired hypothyroidism      Patient has hypertension.  BP is at goal.  The patient's BP goal is < 130/80.  Recommend ambulatory BP monitoring after patient has taken medication.  Recommend varying the times of the blood pressure readings.  Patient is currently on losartan.  Recommend we continue current regimen.  Recently increased by Dr. Can, patient tolerating it well.    Patient had diarrhea starting in the fall.  The patient had recurrent episodes.  He stopped metformin and was started on cholestyramine and it has resolved.  He would like to try to stop the cholestyramine, I think this is reasonable.     Patient weaning off xanax.  He only takes for sleep at present time.  Recommend that he try Sleep 3 or zzzquil.    Patient has type 2 diabetes.  Current A1c is   Hemoglobin A1C (%)   Date/Time Value   01/10/2024 0954 7.4 (A)      Based on patient's last A1c they are currently at goal.. The patient's A1c goal is 7.0 - 7.5%  Recommend routine monitoring of blood sugar. Avoid hypoglycemia. Recommend ADA diet and avoidance of excess carbohydrates. Patient is currently on glipizide.  Recommend that we make changes as above.    Recommend diabetic eye examinations at least annually.  Recommend diabetic foot examinations at least annually.    Patient has the following diabetic complications:  hyperglycemia .    Patient's last diabetic nephropathy evaluation was 7/10/2023.    Creatinine, Urine (mg/dL)   Date/Time Value   07/10/2023 0718 52.8     Microalbumin, Urine (ug/mL)   Date/Time Value   07/10/2023 0718 3.7     Protein (no units)   Date/Time Value   07/10/2023 0718 Negative     Protein, POC (mg/dL)   Date/Time Value   12/06/2023 0819 Negative     A1C Last 3 Results          4/12/2023    14:43 7/10/2023    07:18 1/10/2024    09:54   HGBA1C Last 3 Results   Hemoglobin A1C 7.1  7.60  7.4         Patient on euthyrox 50 mcg.  Recommend  continuing at present time.  Patient clinically and chemically euthyroid.      Patient has hyperlipidemia.  Patient is currently on (a high-intensity statin with a moderate-intensity statin with rosuvastatin 5 mg.  Patient is at goal. Recommend we continue current regimen.  Recommend increased physical activity.  Recommend a diet that focuses on consumption of fruits, vegetables, fiber, and monounsaturated fats and minimizes intake of saturated and trans fats, simple carbohydrates, and red meats. Examples of heart-healthy diets include the DASH (Dietary Approaches to Stop Hypertension) and Mediterranean diets.       Result Review :                   BMI is >= 25 and <30. (Overweight) The following options were offered after discussion;: exercise counseling/recommendations and nutrition counseling/recommendations            Follow Up   Return if symptoms worsen or fail to improve, for Next scheduled follow up, Recheck.  Patient was given instructions and counseling regarding his condition or for health maintenance advice. Please see specific information pulled into the AVS if appropriate.       INDY Galeana MD, FACP, FH      Electronically signed by Jr Galeana MD, 01/31/24, 9:35 AM CST.

## 2024-02-03 DIAGNOSIS — K21.9 GASTROESOPHAGEAL REFLUX DISEASE: ICD-10-CM

## 2024-02-05 ENCOUNTER — NURSE TRIAGE (OUTPATIENT)
Dept: CALL CENTER | Facility: HOSPITAL | Age: 84
End: 2024-02-05
Payer: MEDICARE

## 2024-02-05 DIAGNOSIS — Z79.4 TYPE 2 DIABETES MELLITUS WITH HYPERGLYCEMIA, WITH LONG-TERM CURRENT USE OF INSULIN: Primary | ICD-10-CM

## 2024-02-05 DIAGNOSIS — E11.65 TYPE 2 DIABETES MELLITUS WITH HYPERGLYCEMIA, WITH LONG-TERM CURRENT USE OF INSULIN: ICD-10-CM

## 2024-02-05 DIAGNOSIS — E11.65 TYPE 2 DIABETES MELLITUS WITH HYPERGLYCEMIA, WITH LONG-TERM CURRENT USE OF INSULIN: Primary | ICD-10-CM

## 2024-02-05 DIAGNOSIS — Z79.4 TYPE 2 DIABETES MELLITUS WITH HYPERGLYCEMIA, WITH LONG-TERM CURRENT USE OF INSULIN: ICD-10-CM

## 2024-02-05 DIAGNOSIS — Z79.899 ENCOUNTER FOR LONG TERM BENZODIAZEPINE THERAPY: ICD-10-CM

## 2024-02-05 RX ORDER — PERPHENAZINE 16 MG/1
1 TABLET, FILM COATED ORAL DAILY
Qty: 100 EACH | Refills: 3 | Status: SHIPPED | OUTPATIENT
Start: 2024-02-05

## 2024-02-05 RX ORDER — METFORMIN HYDROCHLORIDE 500 MG/1
500 TABLET, EXTENDED RELEASE ORAL
Qty: 90 TABLET | Refills: 2 | Status: SHIPPED | OUTPATIENT
Start: 2024-02-05

## 2024-02-05 RX ORDER — ESOMEPRAZOLE MAGNESIUM 40 MG/1
40 CAPSULE, DELAYED RELEASE ORAL DAILY
Qty: 90 CAPSULE | Refills: 2 | Status: SHIPPED | OUTPATIENT
Start: 2024-02-05

## 2024-02-05 RX ORDER — ALPRAZOLAM 0.5 MG/1
0.5 TABLET ORAL NIGHTLY PRN
Qty: 30 TABLET | Refills: 2 | Status: SHIPPED | OUTPATIENT
Start: 2024-02-05

## 2024-02-05 NOTE — TELEPHONE ENCOUNTER
Reason for Disposition   [1] Caller has URGENT medicine question about med that PCP or specialist prescribed AND [2] triager unable to answer question    Additional Information   Negative: [1] Caller is not with the adult (patient) AND [2] reporting urgent symptoms   Negative: Lab result questions   Negative: Caller can't be reached by phone   Medication questions   Negative: [1] Intentional drug overdose AND [2] suicidal thoughts or ideas   Negative: Drug overdose and triager unable to answer question   Negative: Caller requesting a renewal or refill of a medicine patient is currently taking   Negative: Caller requesting information unrelated to medicine   Negative: Caller requesting information about COVID-19 Vaccine   Negative: Caller requesting information about Emergency Contraception   Negative: Caller requesting information about Combined Birth Control Pills   Negative: Caller requesting information about Progestin Birth Control Pills   Negative: Caller requesting information about Post-Op pain or medicines   Negative: Caller requesting a prescription antibiotic (such as Penicillin) for Strep throat and has a positive culture result   Negative: Caller requesting a prescription anti-viral med (such as Tamiflu) and has influenza (flu) symptoms   Negative: Immunization reaction suspected   Negative: Rash while taking a medicine or within 3 days of stopping it   Negative: [1] Asthma and [2] having symptoms of asthma (cough, wheezing, etc.)   Negative: [1] Symptom of illness (e.g., headache, abdominal pain, earache, vomiting) AND [2] more than mild   Negative: Breastfeeding questions about mother's medicines and diet   Negative: MORE THAN A DOUBLE DOSE of a prescription or over-the-counter (OTC) drug   Negative: [1] DOUBLE DOSE (an extra dose or lesser amount) of prescription drug AND [2] any symptoms (e.g., dizziness, nausea, pain, sleepiness)   Negative: [1] DOUBLE DOSE (an extra dose or lesser amount) of  "over-the-counter (OTC) drug AND [2] any symptoms (e.g., dizziness, nausea, pain, sleepiness)   Negative: Took another person's prescription drug   Negative: [1] DOUBLE DOSE (an extra dose or lesser amount) of prescription drug AND [2] NO symptoms  (Exception: A double dose of antibiotics.)   Negative: Diabetes drug error or overdose (e.g., took wrong type of insulin or took extra dose)   Negative: [1] Prescription not at pharmacy AND [2] was prescribed by PCP recently (Exception: Triager has access to EMR and prescription is recorded there. Go to Home Care and confirm for pharmacy.)   Negative: [1] Pharmacy calling with prescription question AND [2] triager unable to answer question    Answer Assessment - Initial Assessment Questions  1. REASON FOR CALL or QUESTION: \"What is your reason for calling today?\" or \"How can I best help you?\" or \"What question do you have that I can help answer?\"      Caller is concerned with  was taken off of Metformin last week and is now on Glipizide 5 mg as instructed. Has reservations in [place] for this evening and does not feel comfortable leaving town with he BG being that high. Warm transfer to [place] at PCP office to speak with a provider.    Answer Assessment - Initial Assessment Questions  1. NAME of MEDICINE: \"What medicine(s) are you calling about?\"      Medication for BG. Wants to know if he needs something more for his BG  2. QUESTION: \"What is your question?\" (e.g., double dose of medicine, side effect)      Asking if he needs additional medication for his BG  3. PRESCRIBER: \"Who prescribed the medicine?\" Reason: if prescribed by specialist, call should be referred to that group.      Dr. Galeana  4. SYMPTOMS: \"Do you have any symptoms?\" If Yes, ask: \"What symptoms are you having?\"  \"How bad are the symptoms (e.g., mild, moderate, severe)      no  5. PREGNANCY:  \"Is there any chance that you are pregnant?\" \"When was your last menstrual period?\"      na    Protocols " used: Information Only Call-ADULT-AH, Medication Question Call-ADULT-AH

## 2024-02-05 NOTE — TELEPHONE ENCOUNTER
high BG 02/05/2024 Caller concerned about his  this morning and 199 last night, yesterday morning was 176. Has reservations in [place] for tonight. Was taken off of Metformin last Wednesday. He is Taking Glipizide 5mg.    Hub Transferred the caller, he is concerned that he may need something more for BG control. HE has reservations in [place] this evening and would like to speak to someone before he leaves town. Warm Transfer to [place] at [facility], she states she will get someone to speak with him about this.

## 2024-02-05 NOTE — TELEPHONE ENCOUNTER
Caller: Caleb Wood    Relationship: Self    Best call back number: [phone]        Who are you requesting to speak with (clinical staff, provider,  specific staff member): CLINICAL STAFF    Do you know the name of the person who called: PATIENT     What was the call regarding: HE HAS MULTIPLE QUESTIONS    PATIENT HAS RESERVATIONS TO GO TO [facility]-HE DOESN'T  FEEL WITH HIS BLOOD PRESSURE NUMBERS WHERE THEY ARE  HE FEELS COMFORTABLE GOING AWAY

## 2024-02-05 NOTE — TELEPHONE ENCOUNTER
Caller: Caleb Wood    Relationship: Self    Best call back number: [phone]    Requested Prescriptions:   Requested Prescriptions     Pending Prescriptions Disp Refills    ALPRAZolam (XANAX) 0.5 MG tablet       Sig: Take 1 tablet by mouth At Night As Needed for Anxiety.      CONTOUR NEXT TESTING STRIPS     Pharmacy where request should be sent: [facility] [address] [place] [address] [phone] [facility] [phone]      Last office visit with prescribing clinician: 1/31/2024   Last telemedicine visit with prescribing clinician: Visit date not found   Next office visit with prescribing clinician: 4/10/2024     Additional details provided by patient: PATIENT IS OUT OF HIS TESTING STRIPS     Does the patient have less than a 3 day supply:  [x] Yes  [] No    Would you like a call back once the refill request has been completed: [x] Yes [] No    If the office needs to give you a call back, can they leave a voicemail: [x] Yes [] No    Cindy Barnes Rep   02/05/24 07:51 CST

## 2024-02-05 NOTE — TELEPHONE ENCOUNTER
There are 2 messages - one needing refills, and the other stating his sugars are running higher since stopped Metformin due to diarrhea.  Please address his sugars and send back to the clinical pool.

## 2024-02-05 NOTE — TELEPHONE ENCOUNTER
Caller: Caleb Wood    Relationship: Self    Best call back number: [phone]    What is the best time to reach you: SOON PLEASE     Who are you requesting to speak with (clinical staff, provider,  specific staff member): PROVIDER OR CLINICAL STAFF       What was the call regarding: PATIENT REQUESTING A CALL BACK TO DISCUSS HIS BLOOD SUGAR MEDICATIONS    PATIENT STATES HIS BLOOD SUGARS HAVE BEEN RUNNING HIGH SINCE HE STOPPED THE METFORMIN PATIENT WOULD LIKE TO ADD A MEDICATION TO ASSIST THE GLIPIZIDE AND IS HOPING TO GET THIS RESOLVED TODAY  DUE TO HE IS SCHEDULED TO GO OUT OF TOWN THIS WEEK    PATIENTS BLOOD SUGAR READINGS FROM THIS WEEKEND     2/1/24 THURSDAY  180  2/2/24 FRIDAY  154  2/3/24 SATURDAY 161   2/4/24/ SUNDAY 176 AM   199 AT 8:30   2/5/24 MONDAY  150    Is it okay if the provider responds through MyChart: PREFERS A CALL BACK

## 2024-02-05 NOTE — TELEPHONE ENCOUNTER
Rx Refill Note  Requested Prescriptions     Pending Prescriptions Disp Refills    esomeprazole (nexIUM) 40 MG capsule [Pharmacy Med Name: Esomeprazole Magnesium 40 MG Oral Capsule Delayed Release] 90 capsule 0     Sig: Take 1 capsule by mouth once daily      Last office visit with prescribing clinician: 8/9/2023 with Dr. Odonnell     Last telemedicine visit with prescribing clinician: Visit date not found     Next office visit with prescribing clinician: Visit date not found     Pt just seen by Dr. Odonnell for colon-pended refills to [facility].                           Would you like a call back once the refill request has been completed: [] Yes [] No    If the office needs to give you a call back, can they leave a voicemail: [] Yes [] No    Renata Abel MA  02/05/24, 08:45 CST

## 2024-02-20 RX ORDER — LEVOTHYROXINE SODIUM 0.05 MG/1
TABLET ORAL
Qty: 90 TABLET | Refills: 3 | Status: SHIPPED | OUTPATIENT
Start: 2024-02-20

## 2024-02-23 DIAGNOSIS — E11.65 TYPE 2 DIABETES MELLITUS WITH HYPERGLYCEMIA, WITH LONG-TERM CURRENT USE OF INSULIN: ICD-10-CM

## 2024-02-23 DIAGNOSIS — Z79.4 TYPE 2 DIABETES MELLITUS WITH HYPERGLYCEMIA, WITH LONG-TERM CURRENT USE OF INSULIN: ICD-10-CM

## 2024-02-23 RX ORDER — METFORMIN HYDROCHLORIDE 500 MG/1
500 TABLET, EXTENDED RELEASE ORAL 2 TIMES DAILY
Qty: 180 TABLET | Refills: 2 | Status: SHIPPED | OUTPATIENT
Start: 2024-02-23

## 2024-03-27 ENCOUNTER — TELEPHONE (OUTPATIENT)
Dept: INTERNAL MEDICINE | Facility: CLINIC | Age: 84
End: 2024-03-27

## 2024-03-27 NOTE — TELEPHONE ENCOUNTER
Caller: Caleb Wood    Relationship: Self    Best call back number: [phone], LEAVE A MESSAGE      Who are you requesting to speak with (clinical staff, provider,  specific staff member): CLINICAL STAFF    Do you know the name of the person who called: PATIENT     What was the call regarding: PATIENT CALLED TO DISCUSS THE FACT THAT HE HAS BEEN ON METFORMIN FOR ABOUT 10 YEARS    PATIENT IS TAKING CHOLESTYRAMINE 4 G PACKET, PATIENT HAS DIARRHEA 2 TIMES IN 5 DAYS    PATIENT HAD DIARRHEA TWICE TODAY    HIS QUESTION IS HOW TO KEEP BLOOD SUGAR LOW!    (HE WANTS TO DISCUSS HIS QUESTIONS AT NEXT APPOINTMENT)

## 2024-04-01 NOTE — TELEPHONE ENCOUNTER
PATIENT CALLED TO CHECK ON STATUS OF ENCOUNTER, WAS NEVER REDIRECTED TO [facility].     FIXING ROUTING

## 2024-04-02 NOTE — TELEPHONE ENCOUNTER
No changes to his medications, he can address his questions and concerns at his appointment on 4/10. If his diarrhea becomes more severe before his appointment on 4/10 to the point where he believes he is not able to stay hydrated, please have him call and let us know.

## 2024-04-02 NOTE — TELEPHONE ENCOUNTER
Pt wanting to discontinue Metformin. Pt states he has been experiencing diarrhea since 09/2023. Pt believes it's due to Metformin increase. Per pt's chart, medication dosage has been adjusted multiple times w/ 500 mg and 750 mg. Pt stated he has been on medication for 10+ years. Pt has an appt w/ Dr. Galeana 04/10/24 and wants to know if anything needs to be done before appt.

## 2024-04-10 ENCOUNTER — TELEPHONE (OUTPATIENT)
Dept: INTERNAL MEDICINE | Facility: CLINIC | Age: 84
End: 2024-04-10

## 2024-04-10 ENCOUNTER — OFFICE VISIT (OUTPATIENT)
Dept: INTERNAL MEDICINE | Facility: CLINIC | Age: 84
End: 2024-04-10
Payer: MEDICARE

## 2024-04-10 VITALS
HEART RATE: 69 BPM | HEIGHT: 76 IN | OXYGEN SATURATION: 98 % | WEIGHT: 221 LBS | TEMPERATURE: 97.5 F | SYSTOLIC BLOOD PRESSURE: 134 MMHG | DIASTOLIC BLOOD PRESSURE: 72 MMHG | BODY MASS INDEX: 26.91 KG/M2

## 2024-04-10 DIAGNOSIS — Z79.4 TYPE 2 DIABETES MELLITUS WITH HYPERGLYCEMIA, WITH LONG-TERM CURRENT USE OF INSULIN: Primary | ICD-10-CM

## 2024-04-10 DIAGNOSIS — E03.9 ACQUIRED HYPOTHYROIDISM: Chronic | ICD-10-CM

## 2024-04-10 DIAGNOSIS — I87.2 VENOUS INSUFFICIENCY: Chronic | ICD-10-CM

## 2024-04-10 DIAGNOSIS — E11.65 TYPE 2 DIABETES MELLITUS WITH HYPERGLYCEMIA, WITH LONG-TERM CURRENT USE OF INSULIN: Primary | ICD-10-CM

## 2024-04-10 DIAGNOSIS — E78.00 PURE HYPERCHOLESTEROLEMIA: Chronic | ICD-10-CM

## 2024-04-10 LAB — HBA1C MFR BLD: 7.2 % (ref 4.5–5.7)

## 2024-04-10 RX ORDER — GLIPIZIDE 5 MG/1
5 TABLET, FILM COATED, EXTENDED RELEASE ORAL DAILY
Start: 2024-04-10

## 2024-04-10 RX ORDER — GLIPIZIDE 10 MG/1
10 TABLET, FILM COATED, EXTENDED RELEASE ORAL DAILY
Qty: 90 TABLET | Refills: 2 | Status: SHIPPED | OUTPATIENT
Start: 2024-04-10 | End: 2024-04-10

## 2024-04-10 NOTE — PROGRESS NOTES
"      Chief Complaint  Diabetes (3 month f/u;/A1c - 7.2%), Hyperlipidemia (3 month f/u), Hypertension (3 month f/u;/Denies chest pains and SOB.), Discuss Medication (Patient would like to discuss Metformin - states this is causing diarrhea. /Patient is having to take Imodium twice a week. ), and Discuss Colorectal Cancer Screening    Subjective        Caleb Wood presents to [facility]    HPI    Patient here for the above problems.  See Assessment and Plan for further HPI components.      Review of Systems    Objective   Vital Signs:  /72 (BP Location: Left arm, Patient Position: Sitting, Cuff Size: Adult)   Pulse 69   Temp 97.5 °F (36.4 °C) (Infrared)   Ht 193 cm (76\")   Wt 100 kg (221 lb)   SpO2 98%   BMI 26.90 kg/m²   Estimated body mass index is 26.9 kg/m² as calculated from the following:    Height as of this encounter: 193 cm (76\").    Weight as of this encounter: 100 kg (221 lb).      Physical Exam  Vitals and nursing note reviewed.   Constitutional:       Appearance: He is not ill-appearing.   Eyes:      General: No scleral icterus.     Conjunctiva/sclera: Conjunctivae normal.   Pulmonary:      Effort: Pulmonary effort is normal. No respiratory distress.   Neurological:      General: No focal deficit present.      Mental Status: He is alert and oriented to person, place, and time.   Psychiatric:         Mood and Affect: Mood normal.         Behavior: Behavior normal.                       Assessment and Plan   Diagnoses and all orders for this visit:    1. Type 2 diabetes mellitus with hyperglycemia, with long-term current use of insulin (Primary)  -     POC Glycated Hemoglobin, Total  -     Discontinue: glipizide (GLUCOTROL XL) 10 MG 24 hr tablet; Take 1 tablet by mouth Daily.  Dispense: 90 tablet; Refill: 2  -     glipizide (GLUCOTROL XL) 5 MG ER tablet; Take 1 tablet by mouth Daily.    2. Venous insufficiency    3. Pure hypercholesterolemia    4. Acquired " hypothyroidism    Other orders  -     empagliflozin (Jardiance) 10 MG tablet tablet; Take 1 tablet by mouth Daily.  Dispense: 30 tablet; Refill: 2        The patient has type 2 diabetes.  Hemoglobin A1c goal is less than 7.5 given his advanced age.  The patient would like to get it under 7 if possible.  Patient is not tolerating metformin.  Patient tolerated it for years with loose stool.  However over the last 6 months, he has developed intermittent diarrhea, and has had to take cholestyramine as well as Imodium approximately twice per week.  I am wondering if there is an irritable bowel component to it.  He did take a break from the metformin, and the stools were better formed.  I recommended to the patient that we do another trial of holding the metformin, and see if we can get a medication such as Jardiance approved.  The other option would be to go up on his glipizide to 10.  The only reason I do not like this medication, is it can have a tendency towards hypoglycemia, whereas the incidence of hypoglycemia with an SGLT2 inhibitor is somewhat rare.  Patient is to notify me if the medication is covered by insurance.  He is wanting to try to avoid injectable medication.  I also do not think that the patient would tolerate injectable medication given GI upset that is presently experiencing intermittently.  Another consideration would be to add a DDP4 inhibitor.    Patient has hypertension.  BP is at goal.  The patient's BP goal is < 140/90  Recommend ambulatory BP monitoring after patient has taken medication.  Recommend varying the times of the blood pressure readings.  Patient is currently on losartan.  Recommend we continue current regimen.    Continue to monitor.    Patient has hyperlipidemia and diabetes.  Patienet excluded from most recommendations from guidelines due to the fact that the patient is 83 years old.  He tolerates the crestor and I would recommend continuing this medication.      Patient has  venous insufficiency.  This is gravity dependent.  Worse at the end of the day.  Better in AM.  Recommend low salt diet.  Recommend elevation of lower extremities.  Recommend compression stockings as tolerated.  If persists, then we may need to consider secondary causes or other treatment.      Patient has a history of hypothyroidism.  Stable on current dose of levothyroxine.  Recommend we continue.    Result Review :                   BMI is >= 25 and <30. (Overweight) The following options were offered after discussion;: exercise counseling/recommendations and nutrition counseling/recommendations            Follow Up   Return in about 3 months (around 7/10/2024), or if symptoms worsen or fail to improve, for Recheck.follow-up for above problems.  Longitudinal care.  Patient was given instructions and counseling regarding his condition or for health maintenance advice. Please see specific information pulled into the AVS if appropriate.       INDY Galeana MD, FACP, Replaced by [facility]      Electronically signed by Jr Galeana MD, 04/10/24, 5:52 PM CDT.

## 2024-04-22 ENCOUNTER — TELEPHONE (OUTPATIENT)
Dept: GASTROENTEROLOGY | Facility: CLINIC | Age: 84
End: 2024-04-22
Payer: MEDICARE

## 2024-04-22 NOTE — TELEPHONE ENCOUNTER
Patient called in stating he has had issues with diarrhea for a few months. Dr. Galeana changed his diabetic medication and that has helped some. He now only has diarrhea a few times a week. He takes fiber on occasion and Dr. Galeana also started colestipol. I told him to continue taking that and try taking Metamucil everyday to regular his Bms. Also advised to avoid dairy, greasy foods, caffiene and sugar alternatives. He voiced understanding.

## 2024-06-06 RX ORDER — EMPAGLIFLOZIN 10 MG/1
10 TABLET, FILM COATED ORAL DAILY
Qty: 30 TABLET | Refills: 1 | Status: SHIPPED | OUTPATIENT
Start: 2024-06-06

## 2024-06-19 DIAGNOSIS — R19.5 LOOSE STOOLS: ICD-10-CM

## 2024-06-20 RX ORDER — CHOLESTYRAMINE 4 G/9G
POWDER, FOR SUSPENSION ORAL
Qty: 30 EACH | Refills: 1 | Status: SHIPPED | OUTPATIENT
Start: 2024-06-20

## 2024-07-10 ENCOUNTER — OFFICE VISIT (OUTPATIENT)
Dept: INTERNAL MEDICINE | Facility: CLINIC | Age: 84
End: 2024-07-10
Payer: MEDICARE

## 2024-07-10 VITALS
WEIGHT: 223 LBS | HEIGHT: 76 IN | BODY MASS INDEX: 27.16 KG/M2 | HEART RATE: 68 BPM | OXYGEN SATURATION: 98 % | SYSTOLIC BLOOD PRESSURE: 138 MMHG | DIASTOLIC BLOOD PRESSURE: 72 MMHG | TEMPERATURE: 98.4 F

## 2024-07-10 DIAGNOSIS — Z79.899 ENCOUNTER FOR LONG TERM BENZODIAZEPINE THERAPY: ICD-10-CM

## 2024-07-10 DIAGNOSIS — E03.9 ACQUIRED HYPOTHYROIDISM: Chronic | ICD-10-CM

## 2024-07-10 DIAGNOSIS — E78.00 PURE HYPERCHOLESTEROLEMIA: Chronic | ICD-10-CM

## 2024-07-10 DIAGNOSIS — Z79.4 TYPE 2 DIABETES MELLITUS WITH HYPERGLYCEMIA, WITH LONG-TERM CURRENT USE OF INSULIN: Primary | ICD-10-CM

## 2024-07-10 DIAGNOSIS — I10 BENIGN ESSENTIAL HTN: Chronic | ICD-10-CM

## 2024-07-10 DIAGNOSIS — Z90.79 S/P TURP: ICD-10-CM

## 2024-07-10 DIAGNOSIS — E11.65 TYPE 2 DIABETES MELLITUS WITH HYPERGLYCEMIA, WITH LONG-TERM CURRENT USE OF INSULIN: Primary | ICD-10-CM

## 2024-07-10 DIAGNOSIS — N48.1 BALANITIS: ICD-10-CM

## 2024-07-10 DIAGNOSIS — N39.43 POST-VOID DRIBBLING: ICD-10-CM

## 2024-07-10 LAB — HBA1C MFR BLD: 7.3 % (ref 4.5–5.7)

## 2024-07-10 PROCEDURE — 3051F HG A1C>EQUAL 7.0%<8.0%: CPT | Performed by: INTERNAL MEDICINE

## 2024-07-10 PROCEDURE — G2211 COMPLEX E/M VISIT ADD ON: HCPCS | Performed by: INTERNAL MEDICINE

## 2024-07-10 PROCEDURE — 83036 HEMOGLOBIN GLYCOSYLATED A1C: CPT | Performed by: INTERNAL MEDICINE

## 2024-07-10 PROCEDURE — 3075F SYST BP GE 130 - 139MM HG: CPT | Performed by: INTERNAL MEDICINE

## 2024-07-10 PROCEDURE — 3078F DIAST BP <80 MM HG: CPT | Performed by: INTERNAL MEDICINE

## 2024-07-10 PROCEDURE — 99214 OFFICE O/P EST MOD 30 MIN: CPT | Performed by: INTERNAL MEDICINE

## 2024-07-10 PROCEDURE — 1126F AMNT PAIN NOTED NONE PRSNT: CPT | Performed by: INTERNAL MEDICINE

## 2024-07-10 RX ORDER — ALPRAZOLAM 0.5 MG/1
0.5 TABLET ORAL NIGHTLY PRN
Qty: 30 TABLET | Refills: 2 | Status: SHIPPED | OUTPATIENT
Start: 2024-07-10

## 2024-07-10 RX ORDER — CLOTRIMAZOLE AND BETAMETHASONE DIPROPIONATE 10; .64 MG/G; MG/G
1 CREAM TOPICAL 2 TIMES DAILY
Qty: 45 G | Refills: 0 | Status: SHIPPED | OUTPATIENT
Start: 2024-07-10

## 2024-07-10 NOTE — PROGRESS NOTES
"      Chief Complaint  Hypertension (3 month follow up /Next visit needs to be mwv and labs prior ), Diabetes (A1c: 7.3), and discuss medication (Wants to discuss Jardiance before it is refilled /Has a rash or infection on his private area, wonders if it could be from this medication )    Subjective        Caleb Wood presents to [facility]    HPI    Patient here for the above problems.  See Assessment and Plan for further HPI components.      Review of Systems    Objective   Vital Signs:  /72 (BP Location: Left arm, Patient Position: Sitting, Cuff Size: Adult)   Pulse 68   Temp 98.4 °F (36.9 °C) (Temporal)   Ht 193 cm (76\")   Wt 101 kg (223 lb)   SpO2 98%   BMI 27.14 kg/m²   Estimated body mass index is 27.14 kg/m² as calculated from the following:    Height as of this encounter: 193 cm (76\").    Weight as of this encounter: 101 kg (223 lb).      Physical Exam  Vitals and nursing note reviewed.   Constitutional:       Appearance: He is not ill-appearing.   Eyes:      General: No scleral icterus.     Conjunctiva/sclera: Conjunctivae normal.   Cardiovascular:      Rate and Rhythm: Normal rate and regular rhythm.   Pulmonary:      Effort: Pulmonary effort is normal. No respiratory distress.   Neurological:      General: No focal deficit present.      Mental Status: He is alert and oriented to person, place, and time.   Psychiatric:         Mood and Affect: Mood normal.         Behavior: Behavior normal.                     Assessment and Plan   Diagnoses and all orders for this visit:    1. Type 2 diabetes mellitus with hyperglycemia, with long-term current use of insulin (Primary)  -     Microalbumin / Creatinine Urine Ratio - Urine, Clean Catch; Future  -     Hemoglobin A1c; Future  -     POC Glycated Hemoglobin, Total    2. Acquired hypothyroidism  -     TSH Rfx On Abnormal To Free T4; Future    3. Benign essential HTN  -     CBC & Differential; Future  -     " "Comprehensive Metabolic Panel; Future  -     Urinalysis With Microscopic - Urine, Clean Catch; Future    4. Pure hypercholesterolemia  -     Lipid Panel; Future  -     TSH Rfx On Abnormal To Free T4; Future    5. Encounter for long term benzodiazepine therapy  -     ALPRAZolam (XANAX) 0.5 MG tablet; Take 1 tablet by mouth At Night As Needed for Anxiety.  Dispense: 30 tablet; Refill: 2    6. S/P TURP    7. Post-void dribbling    8. Balanitis    Other orders  -     clotrimazole-betamethasone (LOTRISONE) 1-0.05 % cream; Apply 1 Application topically to the appropriate area as directed 2 (Two) Times a Day.  Dispense: 45 g; Refill: 0  -     empagliflozin (Jardiance) 10 MG tablet tablet; Take 1 tablet by mouth Daily.  Dispense: 30 tablet; Refill: 5      Patient is status post TURP.  Patient has a little bit of postvoid dribbling.  Patient wears liners designed for men in his underwear.  Patient at the base of the glans of the penis between the shaft and the base under the foreskin has a little bit of ulceration.  I do not think that this is from the Jardiance, it seems to be moisture related.  We are going to try some different techniques on making sure to stay dry.  I am also going to try some Lotrisone cream to treat fungal in case there is a fungal component.  Told the patient to really focus on keeping the area nice and dry.  He was pouring peroxide on it and it was \"bubbling\" so he thought that it was infectious.  Discussed with him that he was likely killing off the cells that were tried to help it heal, and causing more problems.  Discussed changing pads frequently if needed.    In regards to the patient's diabetes, hemoglobin A1c was 7.3.  Patient is on glipizide and Jardiance.  For some reason he was taking the glipizide at night.  I have encouraged him to take both medications in the morning.  His fasting sugars have been good, but I suspect that his postprandial sugars have been elevated based on this " A1c.    Her patient has hypertension.  Patient's blood pressure is well-controlled.  Patient does perform ambulatory blood pressure monitoring.  The patient is on losartan 50 mg daily.  I recommend he continue this medication.      .  The patient has a history of hypothyroidism.  The patient has levothyroxine 50 mcg prescribed to him.  Recommend continuing this medication.    Result Review :                   BMI is >= 25 and <30. (Overweight) The following options were offered after discussion;: exercise counseling/recommendations and nutrition counseling/recommendations            Follow Up   Return in about 3 months (around 10/10/2024), or if symptoms worsen or fail to improve, for follow up for above problems. Longitudinal care., Medicare Wellness - Labs prior to visit.  Patient was given instructions and counseling regarding his condition or for health maintenance advice. Please see specific information pulled into the AVS if appropriate.       INDY Galeana MD, FACP, [facility]      Electronically signed by Jr Galeana MD, 07/10/24, 12:07 PM CDT.

## 2024-07-29 DIAGNOSIS — R19.5 LOOSE STOOLS: ICD-10-CM

## 2024-07-29 RX ORDER — CHOLESTYRAMINE 4 G/9G
POWDER, FOR SUSPENSION ORAL
Qty: 30 EACH | Refills: 1 | OUTPATIENT
Start: 2024-07-29

## 2024-08-06 DIAGNOSIS — I10 BENIGN ESSENTIAL HTN: Chronic | ICD-10-CM

## 2024-08-06 RX ORDER — LOSARTAN POTASSIUM 50 MG/1
50 TABLET ORAL DAILY
Qty: 90 TABLET | Refills: 3 | Status: SHIPPED | OUTPATIENT
Start: 2024-08-06

## 2024-09-23 DIAGNOSIS — R19.5 LOOSE STOOLS: ICD-10-CM

## 2024-09-23 RX ORDER — CHOLESTYRAMINE 4 G/9G
POWDER, FOR SUSPENSION ORAL
Qty: 30 EACH | Refills: 0 | Status: SHIPPED | OUTPATIENT
Start: 2024-09-23

## 2024-10-28 DIAGNOSIS — E78.00 PURE HYPERCHOLESTEROLEMIA: Chronic | ICD-10-CM

## 2024-10-28 DIAGNOSIS — E03.9 ACQUIRED HYPOTHYROIDISM: Chronic | ICD-10-CM

## 2024-10-28 DIAGNOSIS — Z79.4 TYPE 2 DIABETES MELLITUS WITH HYPERGLYCEMIA, WITH LONG-TERM CURRENT USE OF INSULIN: ICD-10-CM

## 2024-10-28 DIAGNOSIS — E11.65 TYPE 2 DIABETES MELLITUS WITH HYPERGLYCEMIA, WITH LONG-TERM CURRENT USE OF INSULIN: ICD-10-CM

## 2024-10-28 DIAGNOSIS — I10 BENIGN ESSENTIAL HTN: Chronic | ICD-10-CM

## 2024-10-29 LAB
ALBUMIN SERPL-MCNC: 4.2 G/DL (ref 3.5–5.2)
ALBUMIN/CREAT UR: 6 MG/G CREAT (ref 0–29)
ALBUMIN/GLOB SERPL: 1.8 G/DL
ALP SERPL-CCNC: 68 U/L (ref 39–117)
ALT SERPL-CCNC: 19 U/L (ref 1–41)
APPEARANCE UR: CLEAR
AST SERPL-CCNC: 19 U/L (ref 1–40)
BACTERIA #/AREA URNS HPF: NORMAL /HPF
BASOPHILS # BLD AUTO: 0.03 10*3/MM3 (ref 0–0.2)
BASOPHILS NFR BLD AUTO: 0.7 % (ref 0–1.5)
BILIRUB SERPL-MCNC: 0.6 MG/DL (ref 0–1.2)
BILIRUB UR QL STRIP: NEGATIVE
BUN SERPL-MCNC: 17 MG/DL (ref 8–23)
BUN/CREAT SERPL: 12.7 (ref 7–25)
CALCIUM SERPL-MCNC: 9.4 MG/DL (ref 8.6–10.5)
CASTS URNS MICRO: NORMAL
CHLORIDE SERPL-SCNC: 105 MMOL/L (ref 98–107)
CHOLEST SERPL-MCNC: 161 MG/DL (ref 0–200)
CO2 SERPL-SCNC: 24.3 MMOL/L (ref 22–29)
COLOR UR: YELLOW
CREAT SERPL-MCNC: 1.34 MG/DL (ref 0.76–1.27)
CREAT UR-MCNC: 64.2 MG/DL
EGFRCR SERPLBLD CKD-EPI 2021: 52.6 ML/MIN/1.73
EOSINOPHIL # BLD AUTO: 0.39 10*3/MM3 (ref 0–0.4)
EOSINOPHIL NFR BLD AUTO: 9.2 % (ref 0.3–6.2)
EPI CELLS #/AREA URNS HPF: NORMAL /HPF
ERYTHROCYTE [DISTWIDTH] IN BLOOD BY AUTOMATED COUNT: 12.1 % (ref 12.3–15.4)
GLOBULIN SER CALC-MCNC: 2.3 GM/DL
GLUCOSE SERPL-MCNC: 120 MG/DL (ref 65–99)
GLUCOSE UR QL STRIP: ABNORMAL
HBA1C MFR BLD: 7.2 % (ref 4.8–5.6)
HCT VFR BLD AUTO: 44.8 % (ref 37.5–51)
HDLC SERPL-MCNC: 31 MG/DL (ref 40–60)
HGB BLD-MCNC: 15 G/DL (ref 13–17.7)
HGB UR QL STRIP: NEGATIVE
IMM GRANULOCYTES # BLD AUTO: 0.01 10*3/MM3 (ref 0–0.05)
IMM GRANULOCYTES NFR BLD AUTO: 0.2 % (ref 0–0.5)
KETONES UR QL STRIP: NEGATIVE
LDLC SERPL CALC-MCNC: 97 MG/DL (ref 0–100)
LEUKOCYTE ESTERASE UR QL STRIP: NEGATIVE
LYMPHOCYTES # BLD AUTO: 1.23 10*3/MM3 (ref 0.7–3.1)
LYMPHOCYTES NFR BLD AUTO: 29 % (ref 19.6–45.3)
MCH RBC QN AUTO: 32.1 PG (ref 26.6–33)
MCHC RBC AUTO-ENTMCNC: 33.5 G/DL (ref 31.5–35.7)
MCV RBC AUTO: 95.9 FL (ref 79–97)
MICROALBUMIN UR-MCNC: 3.9 UG/ML
MONOCYTES # BLD AUTO: 0.44 10*3/MM3 (ref 0.1–0.9)
MONOCYTES NFR BLD AUTO: 10.4 % (ref 5–12)
NEUTROPHILS # BLD AUTO: 2.14 10*3/MM3 (ref 1.7–7)
NEUTROPHILS NFR BLD AUTO: 50.5 % (ref 42.7–76)
NITRITE UR QL STRIP: NEGATIVE
NRBC BLD AUTO-RTO: 0 /100 WBC (ref 0–0.2)
PH UR STRIP: 6 [PH] (ref 5–8)
PLATELET # BLD AUTO: 182 10*3/MM3 (ref 140–450)
POTASSIUM SERPL-SCNC: 4.4 MMOL/L (ref 3.5–5.2)
PROT SERPL-MCNC: 6.5 G/DL (ref 6–8.5)
PROT UR QL STRIP: NEGATIVE
RBC # BLD AUTO: 4.67 10*6/MM3 (ref 4.14–5.8)
RBC #/AREA URNS HPF: NORMAL /HPF
SODIUM SERPL-SCNC: 139 MMOL/L (ref 136–145)
SP GR UR STRIP: 1.02 (ref 1–1.03)
TRIGL SERPL-MCNC: 188 MG/DL (ref 0–150)
TSH SERPL DL<=0.005 MIU/L-ACNC: 3.01 UIU/ML (ref 0.27–4.2)
UROBILINOGEN UR STRIP-MCNC: ABNORMAL MG/DL
VLDLC SERPL CALC-MCNC: 33 MG/DL (ref 5–40)
WBC # BLD AUTO: 4.24 10*3/MM3 (ref 3.4–10.8)
WBC #/AREA URNS HPF: NORMAL /HPF

## 2024-10-31 ENCOUNTER — OFFICE VISIT (OUTPATIENT)
Dept: INTERNAL MEDICINE | Facility: CLINIC | Age: 84
End: 2024-10-31
Payer: MEDICARE

## 2024-10-31 VITALS
SYSTOLIC BLOOD PRESSURE: 130 MMHG | HEIGHT: 76 IN | WEIGHT: 225 LBS | TEMPERATURE: 98.4 F | OXYGEN SATURATION: 98 % | HEART RATE: 58 BPM | DIASTOLIC BLOOD PRESSURE: 62 MMHG | BODY MASS INDEX: 27.4 KG/M2

## 2024-10-31 DIAGNOSIS — E78.00 PURE HYPERCHOLESTEROLEMIA: Primary | Chronic | ICD-10-CM

## 2024-10-31 DIAGNOSIS — E03.9 ACQUIRED HYPOTHYROIDISM: Chronic | ICD-10-CM

## 2024-10-31 DIAGNOSIS — E11.65 TYPE 2 DIABETES MELLITUS WITH HYPERGLYCEMIA, WITH LONG-TERM CURRENT USE OF INSULIN: ICD-10-CM

## 2024-10-31 DIAGNOSIS — Z90.79 S/P TURP: ICD-10-CM

## 2024-10-31 DIAGNOSIS — Z79.4 TYPE 2 DIABETES MELLITUS WITH HYPERGLYCEMIA, WITH LONG-TERM CURRENT USE OF INSULIN: ICD-10-CM

## 2024-10-31 DIAGNOSIS — F43.23 SITUATIONAL MIXED ANXIETY AND DEPRESSIVE DISORDER: Chronic | ICD-10-CM

## 2024-10-31 DIAGNOSIS — I87.2 VENOUS INSUFFICIENCY: Chronic | ICD-10-CM

## 2024-10-31 DIAGNOSIS — I10 BENIGN ESSENTIAL HTN: Chronic | ICD-10-CM

## 2024-10-31 NOTE — PROGRESS NOTES
Subjective   The ABCs of the Annual Wellness Visit  Medicare Wellness Visit      Caleb Wood is a 84 y.o. patient who presents for a Medicare Wellness Visit.    The following portions of the patient's history were reviewed and   updated as appropriate: allergies, current medications, past family history, past medical history, past social history, past surgical history, and problem list.    Compared to one year ago, the patient's physical   health is the same.  Compared to one year ago, the patient's mental   health is the same.    Recent Hospitalizations:  He was not admitted to the hospital during the last year.     Current Medical Providers:  Patient Care Team:  Jr Galeana MD as PCP - General (Internal Medicine)  Damien Odonnell MD as Consulting Physician (Gastroenterology)  Romulo Gardner PA as Physician Assistant (Urology)  Aidan Quarles MD as Consulting Physician (Ophthalmology)    Outpatient Medications Prior to Visit   Medication Sig Dispense Refill    acetaminophen (TYLENOL) 500 MG tablet Take 1 tablet by mouth Every 6 (Six) Hours As Needed for Mild Pain.      ALPRAZolam (XANAX) 0.5 MG tablet Take 1 tablet by mouth At Night As Needed for Anxiety. 30 tablet 2    aluminum hydroxide-mag carbonate (GAVISCON EXTRA RELIEF) 160-105 MG chewable tablet chewable tablet Chew Daily As Needed.      aspirin 81 MG chewable tablet Chew 1 tablet Daily. Takes every other day.      cholestyramine (QUESTRAN) 4 g packet DISSOLVE & TAKE 1 POWDER BY MOUTH ONCE DAILY WITH BREAKFAST 30 each 0    clotrimazole-betamethasone (LOTRISONE) 1-0.05 % cream Apply 1 Application topically to the appropriate area as directed 2 (Two) Times a Day. 45 g 0    coenzyme Q10 100 MG capsule Take 2 capsules by mouth Every Evening.      cyclobenzaprine (FLEXERIL) 10 MG tablet Take 1 tablet by mouth 3 (Three) Times a Day As Needed for Muscle Spasms. 30 tablet 0    diclofenac (VOLTAREN) 1 % gel gel Apply 4 g topically to the  appropriate area as directed 4 (Four) Times a Day As Needed. 1-3 times daily      empagliflozin (Jardiance) 10 MG tablet tablet Take 1 tablet by mouth Daily. 30 tablet 5    esomeprazole (nexIUM) 40 MG capsule Take 1 capsule by mouth once daily 90 capsule 2    glipizide (GLUCOTROL XL) 5 MG ER tablet Take 1 tablet by mouth Daily.      glucose blood (Contour Next Test) test strip 1 each by Other route Daily. Contour Next EZ test strip 100 each 3    ibuprofen (ADVIL,MOTRIN) 200 MG tablet Take 1 tablet by mouth Every 8 (Eight) Hours As Needed for Mild Pain.      levothyroxine (SYNTHROID, LEVOTHROID) 50 MCG tablet Take 1 tablet by mouth once daily 90 tablet 3    losartan (COZAAR) 50 MG tablet Take 1 tablet by mouth once daily 90 tablet 3    rosuvastatin (CRESTOR) 5 MG tablet TAKE 1 TABLET BY MOUTH EVERY OTHER DAY 45 tablet 3    traMADol (ULTRAM) 50 MG tablet Take 1 tablet by mouth Daily As Needed for Moderate Pain. 30 tablet 0     No facility-administered medications prior to visit.     Opioid medication/s are on active medication list.  and I have evaluated his active treatment plan and pain score trends (see table).  Vitals:    10/31/24 1311   PainSc: 0-No pain     I have reviewed the chart for potential of high risk medication and harmful drug interactions in the elderly.        Aspirin is on active medication list. Aspirin use is indicated based on review of current medical condition/s. Pros and cons of this therapy have been discussed today. Benefits of this medication outweigh potential harm.  Patient has been encouraged to continue taking this medication.  .      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  ACP discussion was held with the patient during this visit. Patient does not have an advance directive, information provided.            Objective   Vitals:    10/31/24 1311   BP: 130/62   BP Location: Left arm   Patient Position: Sitting   Cuff Size: Adult   Pulse: 58   Temp: 98.4 °F (36.9 °C)   TempSrc: Temporal   SpO2: 98%   Weight: 102 kg (225 lb)   Height: 193 cm (76\")   PainSc: 0-No pain       Estimated body mass index is 27.39 kg/m² as calculated from the following:    Height as of this encounter: 193 cm (76\").    Weight as of this encounter: 102 kg (225 lb).            Does the patient have evidence of cognitive impairment? No  Lab Results   Component Value Date    CHLPL 161 10/28/2024    TRIG 188 (H) 10/28/2024    HDL 31 (L) 10/28/2024    LDL 97 10/28/2024    VLDL 33 10/28/2024    HGBA1C 7.20 (H) 10/28/2024                                                                                               Health  Risk Assessment    Smoking Status:  Social History     Tobacco Use   Smoking Status Former    Current packs/day: 0.00    Types: Cigarettes    Quit date: 1974    Years since quittin.8   Smokeless Tobacco Never     Alcohol Consumption:  Social History     Substance and Sexual Activity   Alcohol Use No       Fall Risk Screen  STEADI Fall Risk Assessment was completed, and patient is at LOW risk for falls.Assessment completed on:10/31/2024    Depression Screening:      10/31/2024     1:10 PM   PHQ-2/PHQ-9 Depression Screening   Little interest or pleasure in doing things Not at all   Feeling down, depressed, or hopeless Not at all     Health Habits and Functional and Cognitive Screening:      10/31/2024     1:10 PM   Functional & Cognitive Status   Do you have " difficulty preparing food and eating? No   Do you have difficulty bathing yourself, getting dressed or grooming yourself? No   Do you have difficulty using the toilet? No   Do you have difficulty moving around from place to place? No   Do you have trouble with steps or getting out of a bed or a chair? No   Current Diet Limited Junk Food   Dental Exam Up to date   Eye Exam Up to date   Exercise (times per week) 0 times per week   Current Exercises Include No Regular Exercise   Do you need help using the phone?  No   Are you deaf or do you have serious difficulty hearing?  Yes   Do you need help to go to places out of walking distance? No   Do you need help shopping? No   Do you need help preparing meals?  No   Do you need help with housework?  No   Do you need help with laundry? No   Do you need help taking your medications? No   Do you need help managing money? No   Do you ever drive or ride in a car without wearing a seat belt? No   Have you felt unusual stress, anger or loneliness in the last month? Yes   Who do you live with? Alone   If you need help, do you have trouble finding someone available to you? No   Have you been bothered in the last four weeks by sexual problems? No   Do you have difficulty concentrating, remembering or making decisions? No           Age-appropriate Screening Schedule:  Refer to the list below for future screening recommendations based on patient's age, sex and/or medical conditions. Orders for these recommended tests are listed in the plan section. The patient has been provided with a written plan.    Health Maintenance List  Health Maintenance   Topic Date Due    TDAP/TD VACCINES (1 - Tdap) Never done    COLORECTAL CANCER SCREENING  08/09/2024    COVID-19 Vaccine (6 - 2023-24 season) 09/01/2024    HEMOGLOBIN A1C  04/28/2025    BMI FOLLOWUP  07/10/2025    DIABETIC EYE EXAM  09/06/2025    LIPID PANEL  10/28/2025    URINE MICROALBUMIN  10/28/2025    ANNUAL WELLNESS VISIT  10/31/2025     RSV Vaccine - Adults  Completed    INFLUENZA VACCINE  Completed    Pneumococcal Vaccine 65+  Completed    ZOSTER VACCINE  Completed                                                                                                                                                 CMS Preventative Services Quick Reference  Risk Factors Identified During Encounter  Immunizations Discussed/Encouraged: Tdap and COVID19  Dental Screening Recommended  Vision Screening Recommended    The above risks/problems have been discussed with the patient.  Pertinent information has been shared with the patient in the After Visit Summary.  An After Visit Summary and PPPS were made available to the patient.    Follow Up:   Next Medicare Wellness visit to be scheduled in 1 year.        Diagnoses and all orders for this visit:    1. Pure hypercholesterolemia (Primary)    2. Benign essential HTN    3. Venous insufficiency    4. Type 2 diabetes mellitus with hyperglycemia, with long-term current use of insulin    5. Acquired hypothyroidism    6. S/P TURP    7. Situational mixed anxiety and depressive disorder      Recommend at least annual dental and vision screening.  Recommend annual influenza vaccination  Recommend a varied diet and appropriate portion sizes.   CDC recommendations for physical activity:  At least 150 minutes a week (for example, 30 minutes a day, 5 days a week) of moderate-intensity activity such as brisk walking. Or can consider 75 minutes a week of vigorous-intensity activity such as hiking, jogging, or running.  At least 2 days a week of activities that strengthen muscles.  Plus activities to improve balance.    History of Present Illness  The patient is an 84-year-old gentleman presenting today for a follow-up on his Medicare wellness visit.    He is noted to have a hemoglobin A1c of 7.0, which has decreased from 7.3 in 07/2024. His blood sugar levels have been fluctuating, with readings of 150, 110, 108, and 109 on  different days. He is currently taking Jardiance for his diabetes. His diet includes cereal with blueberries or fruit, eggs, mclaughlin, biscuits, and gravy. He also enjoys pizza once a week and pasta. He has been consuming apples in the evening for the past 3 to 4 weeks. He maintains hydration by drinking water and tea with sweeteners.    His creatinine level is 1.34, which is slightly worse compared to a previous level of 1.2, indicating a decline in his renal function. He is also known to have a small amount of glucose in his urine.    He reports feeling well overall.    Assessment & Plan  1. Diabetes Mellitus.  His hemoglobin A1c is 7.0, down from 7.3 in July, indicating stable control of his diabetes. He is advised to continue his current medication regimen, including Jardiance, which helps protect kidney function. He is also advised to maintain adequate hydration and limit intake of high-carbohydrate foods such as bread, potatoes, and pasta. He should monitor his fasting blood sugar levels regularly and aim to keep them within the target range.    2. Decline in Renal Function.  His creatinine level has increased to 1.34 from 1.2, indicating a decline in renal function. He is advised to ensure adequate hydration to support kidney function. Jardiance will be continued as it provides renal protection. His kidney function will be re-evaluated at the next visit.    3. Glucosuria.  Presence of glucose in his urine is noted, which is expected with the use of Jardiance. No changes in medication are required at this time.    Follow-up  Return in May for follow-up.          Result Review :           Follow Up:   No follow-ups on file.     An After Visit Summary and PPPS were made available to the patient.                   INDY Galeana MD, FACP, [facility]      Electronically signed by Jr Galeana MD, 10/31/24, 1:36 PM CDT.

## 2024-11-07 DIAGNOSIS — K21.9 GASTROESOPHAGEAL REFLUX DISEASE: ICD-10-CM

## 2024-11-13 RX ORDER — ESOMEPRAZOLE MAGNESIUM 40 MG/1
40 CAPSULE, DELAYED RELEASE ORAL DAILY
Qty: 90 CAPSULE | Refills: 0 | Status: SHIPPED | OUTPATIENT
Start: 2024-11-13

## 2024-11-22 ENCOUNTER — TELEPHONE (OUTPATIENT)
Dept: VASCULAR SURGERY | Facility: CLINIC | Age: 84
End: 2024-11-22
Payer: MEDICARE

## 2024-11-25 ENCOUNTER — HOSPITAL ENCOUNTER (OUTPATIENT)
Dept: ULTRASOUND IMAGING | Facility: HOSPITAL | Age: 84
Discharge: HOME | End: 2024-11-25
Admitting: NURSE PRACTITIONER
Payer: MEDICARE

## 2024-11-25 ENCOUNTER — OFFICE VISIT (OUTPATIENT)
Dept: VASCULAR SURGERY | Facility: CLINIC | Age: 84
End: 2024-11-25
Payer: MEDICARE

## 2024-11-25 VITALS
HEIGHT: 76 IN | DIASTOLIC BLOOD PRESSURE: 70 MMHG | OXYGEN SATURATION: 97 % | BODY MASS INDEX: 25.33 KG/M2 | SYSTOLIC BLOOD PRESSURE: 118 MMHG | HEART RATE: 58 BPM | WEIGHT: 208 LBS

## 2024-11-25 DIAGNOSIS — I10 BENIGN ESSENTIAL HTN: ICD-10-CM

## 2024-11-25 DIAGNOSIS — I87.323 CHRONIC VENOUS HYPERTENSION WITH INFLAMMATION INVOLVING BOTH SIDES: ICD-10-CM

## 2024-11-25 DIAGNOSIS — E11.9 CONTROLLED TYPE 2 DIABETES MELLITUS WITHOUT COMPLICATION, WITHOUT LONG-TERM CURRENT USE OF INSULIN: ICD-10-CM

## 2024-11-25 DIAGNOSIS — I65.23 BILATERAL CAROTID ARTERY STENOSIS: Primary | ICD-10-CM

## 2024-11-25 DIAGNOSIS — E78.00 PURE HYPERCHOLESTEROLEMIA: ICD-10-CM

## 2024-11-25 DIAGNOSIS — I65.23 BILATERAL CAROTID ARTERY STENOSIS: ICD-10-CM

## 2024-11-25 PROCEDURE — 93880 EXTRACRANIAL BILAT STUDY: CPT

## 2024-11-25 NOTE — PROGRESS NOTES
"11/25/2024     Jr Galeana MD  [address] [place] [address]     Caleb Wood  1940    Chief Complaint   Patient presents with    Follow-up     1 year follow up w/ testing. Last seen 11/30/23. Patient denies any stroke type symptoms.        Dear Jr Galeana MD     HPI  I had the pleasure of seeing your patient Caleb Wood in the office today.   As you recall, Caleb Wood is a 84 y.o.  male who you are currently following for routine health maintenance.  Currently, he is doing well and denies any claudication to his lower extremities.  He also denies any strokelike symptoms.  He denies any heaviness, tiredness aching or cramping at night.  He does follow with Dr. Tejeda's office and [place] for diabetic foot care.  He is maintained on aspirin and Crestor.  He did have noninvasive testing performed today, which I did review in office.      Review of Systems   Constitutional: Negative.    HENT: Negative.     Eyes: Negative.    Respiratory: Negative.     Cardiovascular: Negative.    Gastrointestinal: Negative.    Endocrine: Negative.    Genitourinary: Negative.    Musculoskeletal:  Positive for back pain.   Skin: Negative.    Allergic/Immunologic: Negative.    Neurological: Negative.    Hematological: Negative.    Psychiatric/Behavioral: Negative.     All other systems reviewed and are negative.       /70   Pulse 58   Ht 193 cm (76\")   Wt 94.3 kg (208 lb)   SpO2 97%   BMI 25.32 kg/m²   Physical Exam  Vitals and nursing note reviewed.   Constitutional:       General: He is not in acute distress.     Appearance: Normal appearance. He is well-developed. He is not diaphoretic.   HENT:      Head: Normocephalic and atraumatic.   Neck:      Vascular: No carotid bruit or JVD.   Cardiovascular:      Rate and Rhythm: Normal rate and regular rhythm.      Pulses: Normal pulses.           Femoral pulses are 2+ on the right side and 2+ on the left side.       Popliteal pulses are 2+ on " the right side and 2+ on the left side.        Dorsalis pedis pulses are 2+ on the right side and 2+ on the left side.        Posterior tibial pulses are 2+ on the right side and 2+ on the left side.      Heart sounds: Normal heart sounds, S1 normal and S2 normal. No murmur heard.     No friction rub. No gallop.   Pulmonary:      Effort: Pulmonary effort is normal.      Breath sounds: Normal breath sounds.   Abdominal:      General: Bowel sounds are normal. There is no abdominal bruit.      Palpations: Abdomen is soft.      Tenderness: There is no abdominal tenderness.   Musculoskeletal:         General: Normal range of motion.   Skin:     General: Skin is warm and dry.   Neurological:      General: No focal deficit present.      Mental Status: He is alert and oriented to person, place, and time.      Cranial Nerves: No cranial nerve deficit.   Psychiatric:         Mood and Affect: Mood normal.         Behavior: Behavior normal.         Thought Content: Thought content normal.         Judgment: Judgment normal.          Diagnostic Data:  Noninvasive testing including a carotid duplex shows less than 50% carotid stenosis bilaterally with bilateral antegrade vertebral flow.       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 Bilateral carotid artery stenosis  US Carotid Bilateral      2. Chronic venous hypertension with inflammation involving both sides        3. Controlled type 2 diabetes mellitus  without complication, without long-term current use of insulin        4. Benign essential HTN        5. Pure hypercholesterolemia                Plan: After thoroughly evaluating aCleb Wood, I believe the best course of action is to remain conservative from vascular surgery standpoint.  Currently he is doing well and denies any strokelike symptoms.  I did review his testing which shows less than 50% carotid stenosis bilaterally.  For now, we will see him back in 1 year with repeat noninvasive testing for continued surveillance, including a carotid duplex.  I did discuss vascular risk factors as they pertain to the progression of vascular disease including controlling his hypertension, hyperlipidemia, and diabetes.  His blood pressure stable on his current medications.  He should continue his aspirin 81 mg daily and Crestor 20 mg daily in addition to his other medications.  His diabetes is slightly above goal with most recent hemoglobin A1c of 7.2%.  Body mass index is 25.32 kg/m².   This was all discussed in full with complete understanding.  Thank you for allowing me to participate in the care of your patient.  Please do not hesitate to call with any questions or concerns.  We will keep you aware of any further encounters with Caleb Wood.        Sincerely yours,         MERARI Carl, Jr Stout MD

## 2024-11-25 NOTE — LETTER
November 25, 2024     Jr Galeana MD  [address] [place] [address]    Patient: Caleb Wood   YOB: 1940   Date of Visit: 11/25/2024     Dear Jr Galeana MD:       Thank you for referring Caleb Wood to me for evaluation. Below are the relevant portions of my assessment and plan of care.    If you have questions, please do not hesitate to call me. I look forward to following Caleb along with you.         Sincerely,        MERARI Carl        CC: No Recipients    Romy Junior APRN  11/25/24 0941  Sign when Signing Visit  11/25/2024     Jr Galeana MD  [address] [place] [address]     Caleb Wood  1940    Chief Complaint   Patient presents with   • Follow-up     1 year follow up w/ testing. Last seen 11/30/23. Patient denies any stroke type symptoms.        Dear Jr Galeana MD     HPI  I had the pleasure of seeing your patient Caleb Wood in the office today.   As you recall, Caleb Wood is a 84 y.o.  male who you are currently following for routine health maintenance.  Currently, he is doing well and denies any claudication to his lower extremities.  He also denies any strokelike symptoms.  He denies any heaviness, tiredness aching or cramping at night.  He does follow with Dr. Tejeda's office and [place] for diabetic foot care.  He is maintained on aspirin and Crestor.  He did have noninvasive testing performed today, which I did review in office.      Review of Systems   Constitutional: Negative.    HENT: Negative.     Eyes: Negative.    Respiratory: Negative.     Cardiovascular: Negative.    Gastrointestinal: Negative.    Endocrine: Negative.    Genitourinary: Negative.    Musculoskeletal:  Positive for back pain.   Skin: Negative.    Allergic/Immunologic: Negative.    Neurological: Negative.    Hematological: Negative.    Psychiatric/Behavioral: Negative.     All other systems reviewed and are negative.       /70   " Pulse 58   Ht 193 cm (76\")   Wt 94.3 kg (208 lb)   SpO2 97%   BMI 25.32 kg/m²   Physical Exam  Vitals and nursing note reviewed.   Constitutional:       General: He is not in acute distress.     Appearance: Normal appearance. He is well-developed. He is not diaphoretic.   HENT:      Head: Normocephalic and atraumatic.   Neck:      Vascular: No carotid bruit or JVD.   Cardiovascular:      Rate and Rhythm: Normal rate and regular rhythm.      Pulses: Normal pulses.           Femoral pulses are 2+ on the right side and 2+ on the left side.       Popliteal pulses are 2+ on the right side and 2+ on the left side.        Dorsalis pedis pulses are 2+ on the right side and 2+ on the left side.        Posterior tibial pulses are 2+ on the right side and 2+ on the left side.      Heart sounds: Normal heart sounds, S1 normal and S2 normal. No murmur heard.     No friction rub. No gallop.   Pulmonary:      Effort: Pulmonary effort is normal.      Breath sounds: Normal breath sounds.   Abdominal:      General: Bowel sounds are normal. There is no abdominal bruit.      Palpations: Abdomen is soft.      Tenderness: There is no abdominal tenderness.   Musculoskeletal:         General: Normal range of motion.   Skin:     General: Skin is warm and dry.   Neurological:      General: No focal deficit present.      Mental Status: He is alert and oriented to person, place, and time.      Cranial Nerves: No cranial nerve deficit.   Psychiatric:         Mood and Affect: Mood normal.         Behavior: Behavior normal.         Thought Content: Thought content normal.         Judgment: Judgment normal.          Diagnostic Data:  Noninvasive testing including a carotid duplex shows less than 50% carotid stenosis bilaterally with bilateral antegrade vertebral flow.       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 Bilateral carotid artery stenosis  US Carotid Bilateral      2. Chronic venous hypertension with inflammation involving both sides        3. Controlled type 2 diabetes mellitus without complication, without long-term current use of insulin        4. Benign essential HTN        5. Pure hypercholesterolemia                Plan: After thoroughly evaluating Caleb Wood, I believe the best course of action is to remain conservative from vascular surgery standpoint.  Currently he is doing well and denies any strokelike symptoms.  I did review his testing which shows less than 50% carotid stenosis bilaterally.  For now, we will see him back in 1 year with repeat noninvasive testing for continued surveillance, including a carotid duplex.  I did discuss vascular risk factors as they pertain to the progression of vascular disease including controlling his hypertension, hyperlipidemia, and diabetes.  His blood pressure stable on his current medications.  He should continue his aspirin 81 mg daily and Crestor 20 mg daily in addition to his other medications.  His diabetes is slightly above goal with most recent hemoglobin A1c of 7.2%.  Body mass index is 25.32 kg/m².   This was all discussed in full with complete understanding.  Thank you for allowing me to participate in the care of your patient.  Please do not hesitate to call with any questions or concerns.  We will keep you aware of any further encounters with Caleb Wood.        Sincerely yours,         MERARI Carl, Jr Stout MD

## 2024-12-01 DIAGNOSIS — E11.65 TYPE 2 DIABETES MELLITUS WITH HYPERGLYCEMIA, WITH LONG-TERM CURRENT USE OF INSULIN: ICD-10-CM

## 2024-12-01 DIAGNOSIS — Z79.4 TYPE 2 DIABETES MELLITUS WITH HYPERGLYCEMIA, WITH LONG-TERM CURRENT USE OF INSULIN: ICD-10-CM

## 2024-12-02 RX ORDER — GLIPIZIDE 5 MG/1
5 TABLET, FILM COATED, EXTENDED RELEASE ORAL DAILY
Qty: 90 TABLET | Refills: 3 | Status: SHIPPED | OUTPATIENT
Start: 2024-12-02

## 2024-12-05 RX ORDER — ROSUVASTATIN CALCIUM 5 MG/1
5 TABLET, COATED ORAL EVERY OTHER DAY
Qty: 45 TABLET | Refills: 3 | Status: SHIPPED | OUTPATIENT
Start: 2024-12-05

## 2024-12-12 DIAGNOSIS — R19.5 LOOSE STOOLS: ICD-10-CM

## 2024-12-12 RX ORDER — CHOLESTYRAMINE 4 G/9G
POWDER, FOR SUSPENSION ORAL
Qty: 30 EACH | Refills: 2 | Status: SHIPPED | OUTPATIENT
Start: 2024-12-12

## 2025-01-28 ENCOUNTER — OFFICE VISIT (OUTPATIENT)
Dept: INTERNAL MEDICINE | Facility: CLINIC | Age: 85
End: 2025-01-28
Payer: MEDICARE

## 2025-01-28 VITALS
HEART RATE: 76 BPM | OXYGEN SATURATION: 97 % | BODY MASS INDEX: 25.33 KG/M2 | HEIGHT: 76 IN | WEIGHT: 208 LBS | DIASTOLIC BLOOD PRESSURE: 64 MMHG | TEMPERATURE: 97.2 F | SYSTOLIC BLOOD PRESSURE: 132 MMHG

## 2025-01-28 DIAGNOSIS — U07.1 COVID-19 VIRUS INFECTION: ICD-10-CM

## 2025-01-28 DIAGNOSIS — J06.9 ACUTE URI: Primary | ICD-10-CM

## 2025-01-28 LAB
EXPIRATION DATE: ABNORMAL
EXPIRATION DATE: NORMAL
FLUAV AG UPPER RESP QL IA.RAPID: NOT DETECTED
FLUBV AG UPPER RESP QL IA.RAPID: NOT DETECTED
INTERNAL CONTROL: ABNORMAL
INTERNAL CONTROL: NORMAL
Lab: ABNORMAL
Lab: NORMAL
S PYO AG THROAT QL: NEGATIVE
SARS-COV-2 AG UPPER RESP QL IA.RAPID: DETECTED

## 2025-01-28 PROCEDURE — 3078F DIAST BP <80 MM HG: CPT | Performed by: INTERNAL MEDICINE

## 2025-01-28 PROCEDURE — 1159F MED LIST DOCD IN RCRD: CPT | Performed by: INTERNAL MEDICINE

## 2025-01-28 PROCEDURE — 1160F RVW MEDS BY RX/DR IN RCRD: CPT | Performed by: INTERNAL MEDICINE

## 2025-01-28 PROCEDURE — 87428 SARSCOV & INF VIR A&B AG IA: CPT | Performed by: INTERNAL MEDICINE

## 2025-01-28 PROCEDURE — 99213 OFFICE O/P EST LOW 20 MIN: CPT | Performed by: INTERNAL MEDICINE

## 2025-01-28 PROCEDURE — 87880 STREP A ASSAY W/OPTIC: CPT | Performed by: INTERNAL MEDICINE

## 2025-01-28 PROCEDURE — 1126F AMNT PAIN NOTED NONE PRSNT: CPT | Performed by: INTERNAL MEDICINE

## 2025-01-28 PROCEDURE — 3075F SYST BP GE 130 - 139MM HG: CPT | Performed by: INTERNAL MEDICINE

## 2025-01-28 NOTE — PROGRESS NOTES
"      Chief Complaint  Sore Throat (Didn't feel well on Sunday, was going to bed and his throat was sore and nose was dripping.  Has a slight cough.) and Fever (Yesterday morning, took Advil Dual Action, fever this morning was 100.7)    Subjective        Caleb Wood presents to [facility]    HPI    Patient here for the above problems.  See Assessment and Plan for further HPI components.      Review of Systems    Objective   Vital Signs:  /64 (BP Location: Left arm, Patient Position: Sitting, Cuff Size: Adult)   Pulse 76   Temp 97.2 °F (36.2 °C) (Temporal)   Ht 193 cm (75.98\")   Wt 94.3 kg (208 lb)   SpO2 97%   BMI 25.33 kg/m²   Estimated body mass index is 25.33 kg/m² as calculated from the following:    Height as of this encounter: 193 cm (75.98\").    Weight as of this encounter: 94.3 kg (208 lb).      Physical Exam  Vitals and nursing note reviewed.   Constitutional:       Appearance: He is not ill-appearing.   HENT:      Mouth/Throat:      Pharynx: Posterior oropharyngeal erythema and postnasal drip present.   Eyes:      General: No scleral icterus.     Conjunctiva/sclera: Conjunctivae normal.   Pulmonary:      Effort: Pulmonary effort is normal. No respiratory distress.   Neurological:      General: No focal deficit present.      Mental Status: He is alert and oriented to person, place, and time.   Psychiatric:         Mood and Affect: Mood normal.         Behavior: Behavior normal.                       Assessment and Plan   Diagnoses and all orders for this visit:    1. Acute URI (Primary)  -     POCT SARS-CoV-2 + Flu Antigen DEANA  -     POC Rapid Strep A    2. COVID-19 virus infection  -     Nirmatrelvir & Ritonavir, 300mg/100mg, (PAXLOVID); Take 3 tablets by mouth 2 (Two) Times a Day for 5 days.  Dispense: 30 tablet; Refill: 0      Sore throat  Cough  Fever  Started Sunday morning  Patient has had COVID-19 vaccinations    Patient negative for flu and strep  Patient " positive for COVID-19    For sore throat, mucinex, salt water gargles, plenty of hydration    Paxlovid as patient is elderly and diabetes  Hold crestor while on paxlovid.    Reviewed CDC precautions      Result Review :                   BMI is >= 25 and <30. (Overweight) The following options were offered after discussion;: exercise counseling/recommendations and nutrition counseling/recommendations            Follow Up   Return in about 4 months (around 5/28/2025), or if symptoms worsen or fail to improve, for follow up for above problems. Longitudinal care..  Patient was given instructions and counseling regarding his condition or for health maintenance advice. Please see specific information pulled into the AVS if appropriate.       INDY Galeana MD, FACP, FHM      Electronically signed by Jr Galeana MD, 01/28/25, 12:19 PM CST.

## 2025-02-17 DIAGNOSIS — Z79.899 ENCOUNTER FOR LONG TERM BENZODIAZEPINE THERAPY: ICD-10-CM

## 2025-02-17 RX ORDER — ALPRAZOLAM 0.5 MG
0.5 TABLET ORAL NIGHTLY PRN
Qty: 30 TABLET | Refills: 2 | Status: SHIPPED | OUTPATIENT
Start: 2025-02-17

## 2025-02-25 RX ORDER — LEVOTHYROXINE SODIUM 50 UG/1
TABLET ORAL
Qty: 90 TABLET | Refills: 0 | OUTPATIENT
Start: 2025-02-25

## 2025-02-25 RX ORDER — LEVOTHYROXINE SODIUM 50 UG/1
50 TABLET ORAL DAILY
Qty: 90 TABLET | Refills: 3 | Status: SHIPPED | OUTPATIENT
Start: 2025-02-25

## 2025-02-25 NOTE — TELEPHONE ENCOUNTER
Caller: Caleb Wood    Relationship: Self    Best call back number:[phone]     Requested Prescriptions:   Requested Prescriptions     Pending Prescriptions Disp Refills    levothyroxine (SYNTHROID, LEVOTHROID) 50 MCG tablet 90 tablet 3     Sig: Take 1 tablet by mouth Daily.        Pharmacy where request should be sent: [facility] [address] [place] [address] [phone] [facility] [phone]      Last office visit with prescribing clinician: 1/28/2025   Last telemedicine visit with prescribing clinician: Visit date not found   Next office visit with prescribing clinician: 2/25/2025     Additional details provided by patient: PATIENT IS OUT    Does the patient have less than a 3 day supply:  [x] Yes  [] No    Would you like a call back once the refill request has been completed: [x] Yes [] No    If the office needs to give you a call back, can they leave a voicemail: [x] Yes [] No    Cindy Adams   02/25/25 12:32 CST

## 2025-03-03 RX ORDER — EMPAGLIFLOZIN 10 MG/1
10 TABLET, FILM COATED ORAL DAILY
Qty: 30 TABLET | Refills: 5 | Status: SHIPPED | OUTPATIENT
Start: 2025-03-03

## 2025-03-20 DIAGNOSIS — K21.9 GASTROESOPHAGEAL REFLUX DISEASE: ICD-10-CM

## 2025-03-20 RX ORDER — ESOMEPRAZOLE MAGNESIUM 40 MG/1
40 CAPSULE, DELAYED RELEASE ORAL DAILY
Qty: 90 CAPSULE | Refills: 0 | Status: SHIPPED | OUTPATIENT
Start: 2025-03-20 | End: 2025-03-24 | Stop reason: SDUPTHER

## 2025-03-24 ENCOUNTER — OFFICE VISIT (OUTPATIENT)
Dept: GASTROENTEROLOGY | Facility: CLINIC | Age: 85
End: 2025-03-24
Payer: MEDICARE

## 2025-03-24 VITALS
DIASTOLIC BLOOD PRESSURE: 70 MMHG | HEART RATE: 58 BPM | SYSTOLIC BLOOD PRESSURE: 130 MMHG | WEIGHT: 209.6 LBS | TEMPERATURE: 97.3 F | HEIGHT: 76 IN | BODY MASS INDEX: 25.52 KG/M2 | OXYGEN SATURATION: 100 %

## 2025-03-24 DIAGNOSIS — K21.9 GASTROESOPHAGEAL REFLUX DISEASE: ICD-10-CM

## 2025-03-24 PROCEDURE — 1159F MED LIST DOCD IN RCRD: CPT | Performed by: CLINICAL NURSE SPECIALIST

## 2025-03-24 PROCEDURE — 3075F SYST BP GE 130 - 139MM HG: CPT | Performed by: CLINICAL NURSE SPECIALIST

## 2025-03-24 PROCEDURE — 3078F DIAST BP <80 MM HG: CPT | Performed by: CLINICAL NURSE SPECIALIST

## 2025-03-24 PROCEDURE — 99213 OFFICE O/P EST LOW 20 MIN: CPT | Performed by: CLINICAL NURSE SPECIALIST

## 2025-03-24 PROCEDURE — 1160F RVW MEDS BY RX/DR IN RCRD: CPT | Performed by: CLINICAL NURSE SPECIALIST

## 2025-03-24 RX ORDER — ESOMEPRAZOLE MAGNESIUM 40 MG/1
40 CAPSULE, DELAYED RELEASE ORAL DAILY
Qty: 90 CAPSULE | Refills: 3 | Status: SHIPPED | OUTPATIENT
Start: 2025-03-24

## 2025-03-24 NOTE — PROGRESS NOTES
Caleb Wood  1940      3/24/2025  Chief Complaint   Patient presents with    GI Problem     Yearly med refill         HPI    Caleb Wood is a  84 y.o. male here for a follow up visit for acid reflux ongoing persistent for years. He has been on nexium for years. This manages his symptoms well. He denies any melena. No brbpr. No wt loss. No fever chills or sweats.     Past Medical History:   Diagnosis Date    Diabetes mellitus     Type 2    Disease of thyroid gland     GERD (gastroesophageal reflux disease)     Hemorrhoids     Hx of colonic polyps     Hypercholesteremia      Past Surgical History:   Procedure Laterality Date    CATARACT EXTRACTION Bilateral     CHOLECYSTECTOMY      COLONOSCOPY  01/17/2012    Colon polyp tissue not retrieved repeat exam in 5 years    COLONOSCOPY  01/06/2009    adenomatous polyp x1    COLONOSCOPY N/A 06/01/2017    Tubular adenoma Transvere colon, Diverticulosis repeat exam in 5 years    COLONOSCOPY N/A 07/20/2022    Fair prep, 3 Tubular adenomas ascending colon and at 60 cm, tics repeat exam in 1 year    COLONOSCOPY N/A 08/09/2023    Dr. malik-- One 5 mm adenomatous polyp at the appendiceal orifice,One 5 mm adenomatous polyp at the hepatic flexure, removed with a cold snare. Resected and retrieved. - Diverticulosis in the sigmoid colon    CYSTOSCOPY TRANSURETHRAL RESECTION OF PROSTATE N/A 08/02/2022    Procedure: CYSTOSCOPY TRANSURETHRAL RESECTION OF PROSTATE;  Surgeon: López Post MD;  Location: [facility];  Service: Urology;  Laterality: N/A;    ENDOSCOPY  06/25/2013        ENDOSCOPY N/A 01/29/2021    Multiple fundic gland polyps     KNEE SURGERY      SHOULDER SURGERY Right     TONSILLECTOMY         Outpatient Medications Marked as Taking for the 3/24/25 encounter (Office Visit) with Deepti Brito APRN   Medication Sig Dispense Refill    acetaminophen (TYLENOL) 500 MG tablet Take 1 tablet by mouth Every 6 (Six) Hours As Needed for Mild Pain.      ALPRAZolam  (XANAX) 0.5 MG tablet TAKE 1 TABLET BY MOUTH AT NIGHT AS NEEDED FOR ANXIETY (Patient taking differently: Take 1 tablet by mouth At Night As Needed for Anxiety. 1/2) 30 tablet 2    cholestyramine (QUESTRAN) 4 g packet DISSOLVE AND TAKE 1 PACKET BY MOUTH ONCE DAILY WITH BREAKFAST 30 each 2    clotrimazole-betamethasone (LOTRISONE) 1-0.05 % cream Apply 1 Application topically to the appropriate area as directed 2 (Two) Times a Day. 45 g 0    coenzyme Q10 100 MG capsule Take 2 capsules by mouth Every Evening.      diclofenac (VOLTAREN) 1 % gel gel Apply 4 g topically to the appropriate area as directed 4 (Four) Times a Day As Needed. 1-3 times daily      empagliflozin (Jardiance) 10 MG tablet tablet Take 1 tablet by mouth once daily 30 tablet 5    esomeprazole (nexIUM) 40 MG capsule Take 1 capsule by mouth Daily. 90 capsule 3    glipizide (GLUCOTROL XL) 5 MG ER tablet Take 1 tablet by mouth once daily 90 tablet 3    glucose blood (Contour Next Test) test strip 1 each by Other route Daily. Contour Next EZ test strip 100 each 3    ibuprofen (ADVIL,MOTRIN) 200 MG tablet Take 1 tablet by mouth Every 8 (Eight) Hours As Needed for Mild Pain.      levothyroxine (SYNTHROID, LEVOTHROID) 50 MCG tablet Take 1 tablet by mouth Daily. 90 tablet 3    losartan (COZAAR) 50 MG tablet Take 1 tablet by mouth once daily (Patient taking differently: Take 1 tablet by mouth Daily. Every other day) 90 tablet 3    rosuvastatin (CRESTOR) 5 MG tablet Take 1 tablet by mouth Every Other Day. 45 tablet 3    traMADol (ULTRAM) 50 MG tablet Take 1 tablet by mouth Daily As Needed for Moderate Pain. 30 tablet 0    [DISCONTINUED] esomeprazole (nexIUM) 40 MG capsule Take 1 capsule by mouth once daily 90 capsule 0       Allergies   Allergen Reactions    Aleve [Naproxen] Hallucinations    Metformin Diarrhea    Tape Rash     Surgical tape       Social History     Socioeconomic History    Marital status:    Tobacco Use    Smoking status: Former      "Current packs/day: 0.00     Types: Cigarettes     Quit date:      Years since quittin.2    Smokeless tobacco: Never   Vaping Use    Vaping status: Never Used   Substance and Sexual Activity    Alcohol use: No    Drug use: No    Sexual activity: Not Currently       Family History   Problem Relation Age of Onset    Alzheimer's disease Mother     No Known Problems Father     Alzheimer's disease Sister     Alzheimer's disease Sister     Alzheimer's disease Brother     Colon cancer Neg Hx     Colon polyps Neg Hx        Review of Systems   Constitutional:  Negative for activity change, appetite change, chills, diaphoresis, fatigue, fever and unexpected weight change.   HENT:  Negative for ear pain, hearing loss, mouth sores, sore throat, trouble swallowing and voice change.    Eyes: Negative.    Respiratory:  Negative for cough, choking, shortness of breath and wheezing.    Cardiovascular:  Negative for chest pain and palpitations.   Gastrointestinal:  Negative for abdominal pain, blood in stool, constipation, diarrhea, nausea and vomiting.   Endocrine: Negative for cold intolerance and heat intolerance.   Genitourinary:  Negative for decreased urine volume, dysuria, frequency, hematuria and urgency.   Musculoskeletal:  Negative for back pain, gait problem and myalgias.   Skin:  Negative for color change, pallor and rash.   Allergic/Immunologic: Negative for food allergies and immunocompromised state.   Neurological:  Negative for dizziness, tremors, seizures, syncope, weakness, light-headedness, numbness and headaches.   Hematological:  Negative for adenopathy. Does not bruise/bleed easily.   Psychiatric/Behavioral:  Negative for agitation and confusion. The patient is not nervous/anxious.    All other systems reviewed and are negative.      /70 (BP Location: Left arm)   Pulse 58   Temp 97.3 °F (36.3 °C) (Temporal)   Ht 193 cm (75.98\")   Wt 95.1 kg (209 lb 9.6 oz)   SpO2 100%   BMI 25.52 kg/m²   Body " mass index is 25.52 kg/m².    Physical Exam  Constitutional:       Appearance: He is well-developed.   HENT:      Head: Normocephalic and atraumatic.   Eyes:      Pupils: Pupils are equal, round, and reactive to light.   Neck:      Trachea: No tracheal deviation.   Cardiovascular:      Rate and Rhythm: Normal rate and regular rhythm.      Heart sounds: Normal heart sounds. No murmur heard.     No friction rub. No gallop.   Pulmonary:      Effort: Pulmonary effort is normal. No respiratory distress.      Breath sounds: Normal breath sounds. No wheezing or rales.   Chest:      Chest wall: No tenderness.   Abdominal:      General: Bowel sounds are normal. There is no distension.      Palpations: Abdomen is soft. Abdomen is not rigid.      Tenderness: There is no abdominal tenderness. There is no guarding or rebound.   Musculoskeletal:         General: No tenderness or deformity. Normal range of motion.      Cervical back: Normal range of motion and neck supple.   Skin:     General: Skin is warm and dry.      Coloration: Skin is not pale.      Findings: No rash.   Neurological:      Mental Status: He is alert and oriented to person, place, and time.      Deep Tendon Reflexes: Reflexes are normal and symmetric.   Psychiatric:         Behavior: Behavior normal.         Thought Content: Thought content normal.         Judgment: Judgment normal.         ASSESSMENT AND PLAN         Diagnoses and all orders for this visit:    1. Gastroesophageal reflux disease  -     esomeprazole (nexIUM) 40 MG capsule; Take 1 capsule by mouth Daily.  Dispense: 90 capsule; Refill: 3    I discussed non pharmaceutical treatment of gerd.  This includes gradually losing weight to achieve ideal body wt., elevation of the head of bed by 4-6 inches, nothing to eat or drink 3 hours prior to lying down, avoiding tight clothing, stress reduction, tobacco cessation, reduction of alcohol intake, and dietary restrictions (avoiding caffeine, coffee, fatty  foods, mints, chocolate, spicy foods and tomato based sauces as much as possible).        There are no Patient Instructions on file for this visit.  Deepti Genoveva Brito, MERARI  11:09 CDT  3/24/2025    Obesity, Adult  Obesity is the condition of having too much total body fat. Being overweight or obese means that your weight is greater than what is considered healthy for your body size. Obesity is determined by a measurement called BMI. BMI is an estimate of body fat and is calculated from height and weight. For adults, a BMI of 30 or higher is considered obese.  Obesity can eventually lead to other health concerns and major illnesses, including:  Stroke.  Coronary artery disease (CAD).  Type 2 diabetes.  Some types of cancer, including cancers of the colon, breast, uterus, and gallbladder.  Osteoarthritis.  High blood pressure (hypertension).  High cholesterol.  Sleep apnea.  Gallbladder stones.  Infertility problems.  What are the causes?  The main cause of obesity is taking in (consuming) more calories than your body uses for energy. Other factors that contribute to this condition may include:  Being born with genes that make you more likely to become obese.  Having a medical condition that causes obesity. These conditions include:  Hypothyroidism.  Polycystic ovarian syndrome (PCOS).  Binge-eating disorder.  Cushing syndrome.  Taking certain medicines, such as steroids, antidepressants, and seizure medicines.  Not being physically active (sedentary lifestyle).  Living where there are limited places to exercise safely or buy healthy foods.  Not getting enough sleep.  What increases the risk?  The following factors may increase your risk of this condition:  Having a family history of obesity.  Being a woman of -American descent.  Being a man of  descent.  What are the signs or symptoms?  Having excessive body fat is the main symptom of this condition.  How is this diagnosed?  This condition may be  diagnosed based on:  Your symptoms.  Your medical history.  A physical exam. Your health care provider may measure:  Your BMI. If you are an adult with a BMI between 25 and less than 30, you are considered overweight. If you are an adult with a BMI of 30 or higher, you are considered obese.  The distances around your hips and your waist (circumferences). These may be compared to each other to help diagnose your condition.  Your skinfold thickness. Your health care provider may gently pinch a fold of your skin and measure it.  How is this treated?  Treatment for this condition often includes changing your lifestyle. Treatment may include some or all of the following:  Dietary changes. Work with your health care provider and a dietitian to set a weight-loss goal that is healthy and reasonable for you. Dietary changes may include eating:  Smaller portions. A portion size is the amount of a particular food that is healthy for you to eat at one time. This varies from person to person.  Low-calorie or low-fat options.  More whole grains, fruits, and vegetables.  Regular physical activity. This may include aerobic activity (cardio) and strength training.  Medicine to help you lose weight. Your health care provider may prescribe medicine if you are unable to lose 1 pound a week after 6 weeks of eating more healthily and doing more physical activity.  Surgery. Surgical options may include gastric banding and gastric bypass. Surgery may be done if:  Other treatments have not helped to improve your condition.  You have a BMI of 40 or higher.  You have life-threatening health problems related to obesity.  Follow these instructions at home:     Eating and drinking     Follow recommendations from your health care provider about what you eat and drink. Your health care provider may advise you to:  Limit fast foods, sweets, and processed snack foods.  Choose low-fat options, such as low-fat milk instead of whole milk.  Eat 5 or  more servings of fruits or vegetables every day.  Eat at home more often. This gives you more control over what you eat.  Choose healthy foods when you eat out.  Learn what a healthy portion size is.  Keep low-fat snacks on hand.  Avoid sugary drinks, such as soda, fruit juice, iced tea sweetened with sugar, and flavored milk.  Eat a healthy breakfast.  Drink enough water to keep your urine clear or pale yellow.  Do not go without eating for long periods of time (do not fast) or follow a fad diet. Fasting and fad diets can be unhealthy and even dangerous.  Physical Activity   Exercise regularly, as told by your health care provider. Ask your health care provider what types of exercise are safe for you and how often you should exercise.  Warm up and stretch before being active.  Cool down and stretch after being active.  Rest between periods of activity.  Lifestyle   Limit the time that you spend in front of your TV, computer, or video game system.  Find ways to reward yourself that do not involve food.  Limit alcohol intake to no more than 1 drink a day for nonpregnant women and 2 drinks a day for men. One drink equals 12 oz of beer, 5 oz of wine, or 1½ oz of hard liquor.  General instructions   Keep a weight loss journal to keep track of the food you eat and how much you exercise you get.  Take over-the-counter and prescription medicines only as told by your health care provider.  Take vitamins and supplements only as told by your health care provider.  Consider joining a support group. Your health care provider may be able to recommend a support group.  Keep all follow-up visits as told by your health care provider. This is important.  Contact a health care provider if:  You are unable to meet your weight loss goal after 6 weeks of dietary and lifestyle changes.  This information is not intended to replace advice given to you by your health care provider. Make sure you discuss any questions you have with your  health care provider.  Document Released: 01/25/2006 Document Revised: 05/22/2017 Document Reviewed: 10/05/2016  [employer] Patient Education © 2017 [employer]      IF YOU SMOKE OR USE TOBACCO PLEASE READ THE FOLLOWING:    Why is smoking bad for me?  Smoking increases the risk of heart disease, lung disease, vascular disease, stroke, and cancer.     If you smoke, STOP!    If you would like more information on quitting smoking, please visit the [employer] website: www.Adayana/Therapydiaate/healthier-together/smoke   This link will provide additional resources including the QUIT line and the Beat the Pack support groups.     For more information:    Quit Now [place]  1-800-QUIT-NOW  https://kentdaijay.quitlogix.org/en-US/

## 2025-04-18 DIAGNOSIS — E11.65 TYPE 2 DIABETES MELLITUS WITH HYPERGLYCEMIA, WITH LONG-TERM CURRENT USE OF INSULIN: ICD-10-CM

## 2025-04-18 DIAGNOSIS — Z79.4 TYPE 2 DIABETES MELLITUS WITH HYPERGLYCEMIA, WITH LONG-TERM CURRENT USE OF INSULIN: ICD-10-CM

## 2025-04-18 NOTE — TELEPHONE ENCOUNTER
Caller: Caleb Wood    Relationship: Self    Best call back number: [phone]     Requested Prescriptions:   Requested Prescriptions     Pending Prescriptions Disp Refills    glucose blood (Contour Next Test) test strip 100 each 3     Si each by Other route Daily.        Pharmacy where request should be sent: [facility] [address] [place] [address] [phone] [facility] [phone]      Last office visit with prescribing clinician: 2025   Last telemedicine visit with prescribing clinician: Visit date not found   Next office visit with prescribing clinician: 2025     Additional details provided by patient: THREE OR FOUR STRIPS LEFT    Does the patient have less than a 3 day supply:  [] Yes  [x] No    Would you like a call back once the refill request has been completed: [] Yes [] No    If the office needs to give you a call back, can they leave a voicemail: [] Yes [] No    Cindy Condon Rep   25 11:44 CDT

## 2025-05-07 ENCOUNTER — OFFICE VISIT (OUTPATIENT)
Dept: INTERNAL MEDICINE | Facility: CLINIC | Age: 85
End: 2025-05-07
Payer: MEDICARE

## 2025-05-07 VITALS
SYSTOLIC BLOOD PRESSURE: 128 MMHG | OXYGEN SATURATION: 97 % | WEIGHT: 210 LBS | BODY MASS INDEX: 25.57 KG/M2 | DIASTOLIC BLOOD PRESSURE: 64 MMHG | HEIGHT: 76 IN | HEART RATE: 76 BPM | TEMPERATURE: 98.1 F

## 2025-05-07 DIAGNOSIS — M79.5 FOREIGN BODY (FB) IN SOFT TISSUE: ICD-10-CM

## 2025-05-07 DIAGNOSIS — N18.31 STAGE 3A CHRONIC KIDNEY DISEASE: ICD-10-CM

## 2025-05-07 DIAGNOSIS — E11.65 TYPE 2 DIABETES MELLITUS WITH HYPERGLYCEMIA, WITH LONG-TERM CURRENT USE OF INSULIN: ICD-10-CM

## 2025-05-07 DIAGNOSIS — I87.2 VENOUS INSUFFICIENCY: Primary | Chronic | ICD-10-CM

## 2025-05-07 DIAGNOSIS — N18.2 STAGE 2 CHRONIC KIDNEY DISEASE: ICD-10-CM

## 2025-05-07 DIAGNOSIS — Z90.79 S/P TURP: ICD-10-CM

## 2025-05-07 DIAGNOSIS — I10 BENIGN ESSENTIAL HTN: Chronic | ICD-10-CM

## 2025-05-07 DIAGNOSIS — E03.9 ACQUIRED HYPOTHYROIDISM: Chronic | ICD-10-CM

## 2025-05-07 DIAGNOSIS — M54.9 BACK PAIN, UNSPECIFIED BACK LOCATION, UNSPECIFIED BACK PAIN LATERALITY, UNSPECIFIED CHRONICITY: ICD-10-CM

## 2025-05-07 DIAGNOSIS — Z79.899 ENCOUNTER FOR LONG-TERM (CURRENT) USE OF MEDICATIONS: ICD-10-CM

## 2025-05-07 DIAGNOSIS — Z79.4 TYPE 2 DIABETES MELLITUS WITH HYPERGLYCEMIA, WITH LONG-TERM CURRENT USE OF INSULIN: ICD-10-CM

## 2025-05-07 DIAGNOSIS — E78.00 PURE HYPERCHOLESTEROLEMIA: Chronic | ICD-10-CM

## 2025-05-07 LAB
AMPHET+METHAMPHET UR QL: NEGATIVE
AMPHETAMINE INTERNAL CONTROL: NORMAL
AMPHETAMINES UR QL: NEGATIVE
BARBITURATE INTERNAL CONTROL: NORMAL
BARBITURATES UR QL SCN: NEGATIVE
BENZODIAZ UR QL SCN: NEGATIVE
BENZODIAZEPINE INTERNAL CONTROL: NORMAL
BUN SERPL-MCNC: 15 MG/DL (ref 8–23)
BUN/CREAT SERPL: 10.8 (ref 7–25)
BUPRENORPHINE INTERNAL CONTROL: NORMAL
BUPRENORPHINE SERPL-MCNC: NEGATIVE NG/ML
CALCIUM SERPL-MCNC: 9.7 MG/DL (ref 8.6–10.5)
CANNABINOIDS SERPL QL: NEGATIVE
CHLORIDE SERPL-SCNC: 104 MMOL/L (ref 98–107)
CO2 SERPL-SCNC: 24.2 MMOL/L (ref 22–29)
COCAINE INTERNAL CONTROL: NORMAL
COCAINE UR QL: NEGATIVE
CREAT SERPL-MCNC: 1.39 MG/DL (ref 0.76–1.27)
EGFRCR SERPLBLD CKD-EPI 2021: 50.3 ML/MIN/1.73
EXPIRATION DATE: NORMAL
GLUCOSE SERPL-MCNC: 108 MG/DL (ref 65–99)
HBA1C MFR BLD: 6.9 % (ref 4.5–5.7)
Lab: NORMAL
MDMA (ECSTASY) INTERNAL CONTROL: NORMAL
MDMA UR QL SCN: NEGATIVE
METHADONE INTERNAL CONTROL: NORMAL
METHADONE UR QL SCN: NEGATIVE
METHAMPHETAMINE INTERNAL CONTROL: NORMAL
MORPHINE INTERNAL CONTROL: NORMAL
MORPHINE/OPIATES SCREEN, URINE: NEGATIVE
OXYCODONE INTERNAL CONTROL: NORMAL
OXYCODONE UR QL SCN: NEGATIVE
PCP UR QL SCN: NEGATIVE
PHENCYCLIDINE INTERNAL CONTROL: NORMAL
POTASSIUM SERPL-SCNC: 4.7 MMOL/L (ref 3.5–5.2)
PROPOXYPH UR QL SCN: NEGATIVE
PROPOXYPHENE INTERNAL CONTROL: NORMAL
SODIUM SERPL-SCNC: 139 MMOL/L (ref 136–145)
THC INTERNAL CONTROL: NORMAL
TRICYCLIC ANTIDEPRESSANTS INTERNAL CONTROL: NORMAL
TRICYCLICS UR QL SCN: NEGATIVE

## 2025-05-07 RX ORDER — ASPIRIN 81 MG/1
81 TABLET, CHEWABLE ORAL DAILY
COMMUNITY

## 2025-05-07 NOTE — PROGRESS NOTES
"      Chief Complaint  Hypertension, Diabetes (A1c: 6.9), and piece of metal in right ring finger (Thinks it is a piece of aluminum)    Subjective        Caleb Wood presents to [facility]    HPI    Patient here for the above problems.  See Assessment and Plan for further HPI components.      Review of Systems    Objective   Vital Signs:  /64 (BP Location: Left arm, Patient Position: Sitting, Cuff Size: Adult)   Pulse 76   Temp 98.1 °F (36.7 °C) (Temporal)   Ht 193 cm (75.98\")   Wt 95.3 kg (210 lb)   SpO2 97%   BMI 25.58 kg/m²   Estimated body mass index is 25.58 kg/m² as calculated from the following:    Height as of this encounter: 193 cm (75.98\").    Weight as of this encounter: 95.3 kg (210 lb).      Physical Exam  Vitals and nursing note reviewed.   Constitutional:       Appearance: He is not ill-appearing.   Eyes:      General: No scleral icterus.     Conjunctiva/sclera: Conjunctivae normal.   Pulmonary:      Effort: Pulmonary effort is normal. No respiratory distress.   Musculoskeletal:        Arms:    Skin:     Comments: No foreign body found in hand/skin   Neurological:      General: No focal deficit present.      Mental Status: He is alert and oriented to person, place, and time.   Psychiatric:         Mood and Affect: Mood normal.         Behavior: Behavior normal.                         Assessment and Plan   Diagnoses and all orders for this visit:    1. Venous insufficiency (Primary)    2. Type 2 diabetes mellitus with hyperglycemia, with long-term current use of insulin  -     POC Glycated Hemoglobin, Total  -     Microalbumin / Creatinine Urine Ratio - Urine, Clean Catch; Future  -     Hemoglobin A1c; Future    3. Encounter for long-term (current) use of medications  -     POC Medline 14 Panel Urine Drug Screen    4. Acquired hypothyroidism    5. Benign essential HTN  -     CBC & Differential; Future  -     Comprehensive Metabolic Panel; Future    6. Pure " hypercholesterolemia  -     Lipid Panel; Future  -     TSH Rfx On Abnormal To Free T4; Future    7. Stage 2 chronic kidney disease  -     Basic Metabolic Panel    8. S/P TURP        History of Present Illness  The patient presents today for follow-up.    He reports a decrease in the strength of his urinary stream, which has been ongoing for several years. He has a history of prostate issues, previously managed by Dr. Post.    For the past week to 10 days, he has been experiencing sharp, intermittent pain in his back. The pain is not localized to his hip. He recalls a recent incident where he mowed his yard using a riding mower and also cleaned his gutters approximately 2 months ago.    He expresses concern about a potential impact on future MRI scans due to a piece of metal lodged in his skin. This occurred when he was cleaning his gutters approximately 2 months ago. Several attempts were made to remove the foreign body with a needle, resulting in significant soreness. However, the soreness has since subsided.    He has a history of frequent dermatology visits.          Assessment & Plan  Diabetes Mellitus.  His A1c level has improved to 6.9, indicating good control of his diabetes. Feet are showing a little dryness, but nails are in good shape. He was advised to continue his current medication regimen and maintain a balanced diet. Regular exercise is recommended to help manage blood sugar levels. Continue jardiance 10 mg daily, glipizide 5 mg ER daily    Chronic Kidney Disease Stage IIIa.  Kidney function remains stable, with a slight drop from 60 to 52. He was advised to stay hydrated, especially during travel, to prevent dehydration. The importance of keeping blood sugar under control to reduce dehydration was reviewed. A blood test will be conducted today to monitor kidney function.    Back Pain.  Pain is likely due to muscle strain from standing on uneven surfaces and repetitive casting while fishing. There is  no significant tenderness or pain upon physical examination. He was advised to use Salonpas patches for pain relief and to take breaks while fishing to rest his back. He was reassured that the pain is not related to kidney issues.    Foreign Body in Skin.  The foreign body appears to have been expelled, leaving only callus formation. No significant discomfort is reported. He was advised to leave the area alone as it is not causing significant discomfort.     Hypertension  Continue to monitor.  BP adequately controlled.  CKD 3a avoid nephrotxins continue [facility]      Health Maintenance.  He was advised to get a Tdap vaccine at a pharmacy due to frequent travel and outdoor activities. The importance of keeping up with tetanus vaccinations, especially with exposure to potential injuries while fishing, was discussed.    Continue rosuvastatin      Result Review :  The following data was reviewed by: Jr Galeana MD on 05/07/2025:  CMP          10/28/2024    08:35   CMP   Glucose 120    BUN 17    Creatinine 1.34    EGFR 52.6    Sodium 139    Potassium 4.4    Chloride 105    Calcium 9.4    Total Protein 6.5    Albumin 4.2    Globulin 2.3    Total Bilirubin 0.6    Alkaline Phosphatase 68    AST (SGOT) 19    ALT (SGPT) 19    Albumin/Globulin Ratio 1.8    BUN/Creatinine Ratio 12.7      CBC w/diff          10/28/2024    08:35   CBC w/Diff   WBC 4.24    RBC 4.67    Hemoglobin 15.0    Hematocrit 44.8    MCV 95.9    MCH 32.1    MCHC 33.5    RDW 12.1    Platelets 182    Neutrophil Rel % 50.5    Lymphocyte Rel % 29.0    Monocyte Rel % 10.4    Eosinophil Rel % 9.2    Basophil Rel % 0.7      Lipid Panel          10/28/2024    08:35   Lipid Panel   Total Cholesterol 161    Triglycerides 188    HDL Cholesterol 31    VLDL Cholesterol 33    LDL Cholesterol  97      TSH          10/28/2024    08:35   TSH   TSH 3.010      A1C Last 3 Results          7/10/2024    11:12 10/28/2024    08:35 5/7/2025    08:21   HGBA1C Last 3 Results    Hemoglobin A1C 7.3  7.20  6.9      Microalbumin          10/28/2024    08:35   Microalbumin   Microalbumin, Urine 3.9      UA          10/28/2024    08:35   Urinalysis   Blood, UA Negative    Leukocytes, UA Negative    Nitrite, UA Negative    RBC, UA 0-2    Bacteria, UA Comment                               Follow Up   Return in about 6 months (around 11/7/2025), or if symptoms worsen or fail to improve, for follow up for above problems. [facility] care., Medicare Wellness - Labs prior to visit.  Patient was given instructions and counseling regarding his condition or for health maintenance advice. Please see specific information pulled into the AVS if appropriate.       INDY Galeana MD, FACP, [facility]      Electronically signed by Jr Galeana MD, 05/07/25, 6:14 PM CDT.    Patient or patient representative verbalized consent for the use of Ambient Listening during the visit with  Jr Galeana MD for chart documentation. 5/7/2025  18:17 CDT

## 2025-05-20 DIAGNOSIS — R19.5 LOOSE STOOLS: ICD-10-CM

## 2025-05-20 RX ORDER — CHOLESTYRAMINE 4 G/9G
POWDER, FOR SUSPENSION ORAL
Qty: 90 EACH | Refills: 3 | Status: SHIPPED | OUTPATIENT
Start: 2025-05-20

## 2025-06-18 DIAGNOSIS — Z79.899 ENCOUNTER FOR LONG TERM BENZODIAZEPINE THERAPY: ICD-10-CM

## 2025-06-18 RX ORDER — ALPRAZOLAM 0.5 MG
0.5 TABLET ORAL NIGHTLY PRN
Qty: 30 TABLET | Refills: 2 | Status: SHIPPED | OUTPATIENT
Start: 2025-06-18

## 2025-08-16 DIAGNOSIS — I10 BENIGN ESSENTIAL HTN: Chronic | ICD-10-CM

## 2025-08-18 RX ORDER — LOSARTAN POTASSIUM 50 MG/1
50 TABLET ORAL DAILY
Qty: 90 TABLET | Refills: 3 | Status: SHIPPED | OUTPATIENT
Start: 2025-08-18

## 2025-08-25 RX ORDER — EMPAGLIFLOZIN 10 MG/1
10 TABLET, FILM COATED ORAL DAILY
Qty: 90 TABLET | Refills: 3 | Status: SHIPPED | OUTPATIENT
Start: 2025-08-25

## (undated) DEVICE — ENDOGATOR AUXILIARY WATER JET CONNECTOR: Brand: ENDOGATOR

## (undated) DEVICE — YANKAUER,BULB TIP WITH VENT: Brand: ARGYLE

## (undated) DEVICE — Device: Brand: DEFENDO AIR/WATER/SUCTION AND BIOPSY VALVE

## (undated) DEVICE — BHS TURNOVER CYSTO: Brand: MEDLINE INDUSTRIES, INC.

## (undated) DEVICE — CUFF,BP,DISP,1 TUBE,ADULT,HP: Brand: MEDLINE

## (undated) DEVICE — THE CHANNEL CLEANING BRUSH IS A NYLON FLEXI BRUSH ATTACHED TO A FLEXIBLE PLASTIC SHEATH DESIGNED TO SAFELY REMOVE DEBRIS FROM FLEXIBLE ENDOSCOPES.

## (undated) DEVICE — TBG SMPL FLTR LINE NASL 02/C02 A/ BX/100

## (undated) DEVICE — GLV SURG BIOGEL M LTX PF 7 1/2

## (undated) DEVICE — THE SINGLE USE ETRAP – POLYP TRAP IS USED FOR SUCTION RETRIEVAL OF ENDOSCOPICALLY REMOVED POLYPS.: Brand: ETRAP

## (undated) DEVICE — ST FLD IRR WARM

## (undated) DEVICE — MASK,OXYGEN,MED CONC,ADLT,7' TUB, UC: Brand: PENDING

## (undated) DEVICE — SENSR O2 OXIMAX FNGR A/ 18IN NONSTR

## (undated) DEVICE — EVAC BLDR UROVAC W ADAPT

## (undated) DEVICE — ELECTRD SUPERSECT FRNT LOAD 5PK

## (undated) DEVICE — DRSNG GZ PETROLTM CURAD 3X9IN STRL

## (undated) DEVICE — BAG,DRAINAGE,4L,A/R TOWER,LL,SLIDE TAP: Brand: MEDLINE

## (undated) DEVICE — PK CYSTO 30

## (undated) DEVICE — DOVER HYDROGEL COATED LATEX FOLEY CATHETER, 30 ML, 3-WAY 24 FR/CH (8.0 MM): Brand: DOVER

## (undated) DEVICE — SNAR POLYP CAPTIVATOR RND STFF 2.4 240CM 10MM 1P/U

## (undated) DEVICE — SNAR POLYP SENSATION MICRO OVL 13 240X40

## (undated) DEVICE — BUTN RESECT IGLESIUS 5PK 1P/U

## (undated) DEVICE — CONMED SCOPE SAVER BITE BLOCK, 20X27 MM: Brand: SCOPE SAVER

## (undated) DEVICE — MSK O2 MD CONCENTR A/ LF 7FT 1P/U

## (undated) DEVICE — SYRINGE,PISTON,IRRIGATION,60ML,STERILE: Brand: MEDLINE